# Patient Record
Sex: MALE | Employment: OTHER | URBAN - METROPOLITAN AREA
[De-identification: names, ages, dates, MRNs, and addresses within clinical notes are randomized per-mention and may not be internally consistent; named-entity substitution may affect disease eponyms.]

---

## 2017-03-08 ENCOUNTER — ALLSCRIPTS OFFICE VISIT (OUTPATIENT)
Dept: OTHER | Facility: OTHER | Age: 47
End: 2017-03-08

## 2017-03-08 DIAGNOSIS — M77.11 LATERAL EPICONDYLITIS OF RIGHT ELBOW: ICD-10-CM

## 2017-09-08 ENCOUNTER — HOSPITAL ENCOUNTER (EMERGENCY)
Facility: HOSPITAL | Age: 47
Discharge: HOME | End: 2017-09-08
Attending: EMERGENCY MEDICINE | Admitting: EMERGENCY MEDICINE
Payer: COMMERCIAL

## 2017-09-08 VITALS
RESPIRATION RATE: 18 BRPM | HEART RATE: 103 BPM | TEMPERATURE: 98.3 F | SYSTOLIC BLOOD PRESSURE: 200 MMHG | OXYGEN SATURATION: 98 % | BODY MASS INDEX: 28.25 KG/M2 | HEIGHT: 67 IN | DIASTOLIC BLOOD PRESSURE: 110 MMHG | WEIGHT: 180 LBS
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
## 2017-09-08 DIAGNOSIS — M54.50 ACUTE BILATERAL LOW BACK PAIN WITHOUT SCIATICA: ICD-10-CM

## 2017-09-08 DIAGNOSIS — M54.2 ANTERIOR NECK PAIN: ICD-10-CM

## 2017-09-08 PROCEDURE — 99284 EMERGENCY DEPT VISIT MOD MDM: CPT

## 2017-09-08 RX ORDER — ACETAMINOPHEN 325 MG/1
975 TABLET ORAL ONCE
Status: COMPLETED | OUTPATIENT
Start: 2017-09-08 | End: 2017-09-08

## 2017-09-08 RX ORDER — NAPROXEN 500 MG/1
500 TABLET ORAL ONCE
Status: COMPLETED | OUTPATIENT
Start: 2017-09-08 | End: 2017-09-08

## 2017-09-08 RX ADMIN — ACETAMINOPHEN 975 MG: 325 TABLET, FILM COATED ORAL at 16:16

## 2017-09-08 RX ADMIN — NAPROXEN 500 MG: 500 TABLET ORAL at 16:15

## 2017-11-17 ENCOUNTER — ALLSCRIPTS OFFICE VISIT (OUTPATIENT)
Dept: OTHER | Facility: OTHER | Age: 47
End: 2017-11-17

## 2017-11-17 ENCOUNTER — HOSPITAL ENCOUNTER (OUTPATIENT)
Dept: RADIOLOGY | Facility: HOSPITAL | Age: 47
Discharge: HOME | End: 2017-11-17
Attending: ORTHOPAEDIC SURGERY
Payer: COMMERCIAL

## 2017-11-17 DIAGNOSIS — M25.562 PAIN IN LEFT KNEE: ICD-10-CM

## 2017-11-17 DIAGNOSIS — M22.40 CHONDROMALACIA PATELLAE: ICD-10-CM

## 2017-11-17 PROCEDURE — 73562 X-RAY EXAM OF KNEE 3: CPT

## 2017-11-18 NOTE — PROGRESS NOTES
Assessment    1  Left knee pain (645 67) (M25 562)    Plan  Chondromalacia of patella, unspecified laterality    · *1 - [facility]  * evaluate and treat 2 times week for up to 6weeks  Status: Active  Requested for: [id number]   () Care Summary provided  : Yes1   Left knee pain    · Administered: Betamethasone Sod Phos & Acet 6 (3-3) MG/ML Injection Suspension(Celestone Soluspan)1    · XR KNEE 1 OR 2 VIEW LEFT; Status:Active - Retrospective By Protocol Authorization; Requested for:[id number];      1 Amended By: Tru Aguilera; Nov 17 2017 11:01 AM EST    Discussion/Summary    Left knee pain suspect patellofemoral chondromalacia  Also medial joint space arthritis  Krystina taping technique providedinjection givenFollow-up on a p r n  basis  Referral to physical therapy  Chief Complaint    1  Knee Pain    History of Present Illness  HPI: Patient comes in today with regards to his left knee  He reports that he can't bend or flex it  He reports that the pain seems to be aggravated with quick movements  No specific injury that he can recollect  He has tried anti-inflammatories ibuprofen Aleve as well as tramadol  He has not had any relief with this  No other treatments no other injuries to this knee  Although his chart does show that he has been seen in 2013 for the same knee by Dr Effie Fajardo  Pain ranges from 7 out 10-10 out 10  No injury sudden movement increases the pain but so does tip toeing  Past medical history otherwise positive for fibromyalgia and multiple joint pain  He has had rotator cuff surgery as well in the past       Review of Systems   Constitutional: No fever or chills, feels well, no tiredness, no recent weight loss or weight gain  Eyes: No complaints of red eyes, no eyesight problems  ENT: no complaints of loss of hearing, no nosebleeds, no sore throat  Cardiovascular: No complaints of chest pain, no palpitations, no leg claudication or lower extremity edema    Respiratory: No complaints of shortness of breath, no wheezing, no cough  Gastrointestinal: No complaints of abdominal pain, no constipation, no nausea or vomiting, no diarrhea or bloody stools  Genitourinary: No complaints of dysuria or incontinence, no hesitancy, no nocturia  Musculoskeletal: as noted in HPI  Integumentary: No complaints of skin rash or lesion, no itching or dry skin, no skin wounds  Neurological: No complaints of headache, no confusion, no numbness or tingling, no dizziness  Psychiatric: No suicidal thoughts, no anxiety, no depression  Endocrine: No muscle weakness, no frequent urination, no excessive thirst, no feelings of weakness  Active Problems  1  Carpal tunnel syndrome of right wrist (354 0) (G56 01)  2  Chondromalacia of patella, unspecified laterality (717 7) (M22 40)  3  Closed Fracture Of The Head Of The Right Fifth Metacarpal (815 00)  4  Closed Fracture Of The Head Of The Right Fourth Metacarpal (815 00)  5  Cubital tunnel syndrome on left (354 2) (G56 22)  6  Cubital tunnel syndrome on right (354 2) (G56 21)  7  Epicondylitis, lateral, right (726 32) (M77 11)  8  Incomplete tear of right rotator cuff (840 4) (M75 111)  9  Left knee pain (719 46) (M25 562)  10  Neuropathy, 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 (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 2) (G56 20)  11  Pain in joint of right shoulder region (719 41) (M25 511)    Past Medical History    The active problems and past medical history were reviewed and updated today  Surgical History   · History of Neuroplasty Ulnar Nerve    The surgical history was reviewed and updated today  Family History    The family history was reviewed and updated today  Social History     · Never A Smoker  The social history was reviewed and updated today  Current Meds  1  Fioricet TABS (Butalbital-APAP-Caffeine) Recorded  2  Ibuprofen 800 MG Oral Tablet; Therapy: 21Kje8620 to (Last Rx:11Slo7650)  Requested for: 42Jpx8061 Ordered  3  LamoTRIgine 150 MG Oral Tablet;  Therapy: 69ZTX5537 to (Last Rx:12Nov2010)  Requested for: 07UFJ4554 Ordered  4  Oxycodone-Acetaminophen 5-325 MG Oral Tablet; TAKE 1 TABLET EVERY 4 TO 6 HOURS AS NEEDED FOR PAIN; Therapy: 09Apr2014 to (Last Rx:09Apr2014) Ordered  5  Robaxin TABS (Methocarbamol); Therapy: (Vicenta Roll) to Recorded  6  TraMADol HCl - 50 MG Oral Tablet; Therapy: 39Nyc4396 to (Last Rx:33Ffo8051)  Requested for: 19Bsn1842 Ordered    The medication list was reviewed and updated today  Allergies  1  No Known Drug Allergies    Vitals  Signs     Heart Rate: 86  Systolic: 641  Diastolic: 101  Height: 5 ft 7 in  Weight: 183 lb 2 08 oz  BMI Calculated: 28 68  BSA Calculated: 1 95    Physical Exam   Constitutional - General appearance: Normal   Musculoskeletal - Digits and nails: Normal -- Inspection/palpation of joints, bones, and muscles: Normal -- Muscle strength/tone: Normal   Cardiovascular - Pulses: Normal   Lymphatic - Palpation of lymph nodes in other areas: Normal   Skin - Skin and subcutaneous tissue: Normal   Neurologic - Reflexes: Normal -- Sensation: Normal -- Lower extremity peripheral neuro exam: Normal   Psychiatric - Orientation to person, place, and time: Normal -- Mood and affect: Normal   Eyes  Conjunctiva and lids: Normal        Results/Data  I personally reviewed the films/images/results in the office today  My interpretation follows  X-ray Review Mild arthritis medial joint space  Procedure    Procedure: Injection of the right knee joint  Indication:  Osteoarthritis  Potential complications include bleeding  Risk were discussed with the patient  Verbal consent was obtained prior to the procedure  Alcohol and Betadine was used to prep the area  ethyl chloride spray was used as a topical anesthetic  Using sterile technique, the aspiration/injection needle was then directed from a Anterolateral aspect  A 22-gauge was used to inject 1 mL of 1% Lidocaine,-- 1 mL of 0 25% Bupivacaine-- and-- 2 mL of 6mg/mL betamethasone   A bandage was applied  the patient tolerated the procedure well  Complications: none        Signatures   Electronically signed by : Leonela Soria DO; Nov 17 2017 11:01AM EST                       (Author)

## 2018-01-05 ENCOUNTER — APPOINTMENT (OUTPATIENT)
Dept: PHYSICAL THERAPY | Facility: CLINIC | Age: 48
End: 2018-01-05
Payer: COMMERCIAL

## 2018-01-05 PROCEDURE — G8990 OTHER PT/OT CURRENT STATUS: HCPCS

## 2018-01-05 PROCEDURE — 97162 PT EVAL MOD COMPLEX 30 MIN: CPT

## 2018-01-05 PROCEDURE — 97110 THERAPEUTIC EXERCISES: CPT

## 2018-01-05 PROCEDURE — G8991 OTHER PT/OT GOAL STATUS: HCPCS

## 2018-01-08 ENCOUNTER — APPOINTMENT (OUTPATIENT)
Dept: PHYSICAL THERAPY | Facility: CLINIC | Age: 48
End: 2018-01-08
Payer: COMMERCIAL

## 2018-01-08 PROCEDURE — 97110 THERAPEUTIC EXERCISES: CPT

## 2018-01-11 NOTE — PROGRESS NOTES
Assessment    1  Epicondylitis, lateral, right (72 32) (M77 11)    Plan  Epicondylitis, lateral, right    · Follow-up visit in 2 months Evaluation and Treatment  Follow-up  Status: Hold For -  Scheduling  Requested for: 13KAF7470   · Continue with our present treatment plan ; Status:Complete;   Done: 78BAX9783 05:37PM   · *1 - [facility] Physician Referral  Consult: right ECRL and  brachioradialis tendonitis, hx of ECRB release/tennis elbow  Status: Active  Requested  for: 57MZR7683  Care Summary provided  : Yes    Discussion/Summary    56-year-old male with history of right tennis elbow release, currently presenting with symptoms of pain over the lateral epicondyle and tendinitis of the ECRL, brachial radialis  We will send patient for physical therapy  Followup in 3 months     Chief Complaint    1  Elbow Pain  Right lateral elbow pain, left medial elbow pain  History of Present Illness  HPI: Patient is a 56-year-old male, who is approximately one-year status post right lateral epicondylar release  Patient reports that he is having recurrence of right elbow laterally based complaints  He has pain with wrist extenVirtually denies numbness or tingling  kavon and tight gripping  He denies a numbness or tingling  He reports that occasionally his left ulnar nerve jumps  Review of Systems    Constitutional: No fever or chills, feels well, no tiredness, no recent weight loss or weight gain  Eyes: No complaints of red eyes, no eyesight problems  ENT: no complaints of loss of hearing, no nosebleeds, no sore throat  Cardiovascular: No complaints of chest pain, no palpitations, no leg claudication or lower extremity edema  Respiratory: No complaints of shortness of breath, no wheezing, no cough  Gastrointestinal: No complaints of abdominal pain, no constipation, no nausea or vomiting, no diarrhea or bloody stools     Genitourinary: No complaints of dysuria or incontinence, no hesitancy, no nocturia  Musculoskeletal: as noted in HPI  Integumentary: No complaints of skin rash or lesion, no itching or dry skin, no skin wounds  Neurological: No complaints of headache, no confusion, no numbness or tingling, no dizziness  Psychiatric: No suicidal thoughts, no anxiety, no depression  Endocrine: No muscle weakness, no frequent urination, no excessive thirst, no feelings of weakness  ROS reviewed  Active Problems    1  Carpal tunnel syndrome of right wrist (354 0) (G56 01)   2  Chondromalacia of patella, unspecified laterality (717 7) (M22 40)   3  Closed Fracture Of The Head Of The Right Fifth Metacarpal (815 00)   4  Closed Fracture Of The Head Of The Right Fourth Metacarpal (815 00)   5  Cubital tunnel syndrome on left (354 2) (G56 22)   6  Cubital tunnel syndrome on right (354 2) (G56 21)   7  Epicondylitis, lateral, right (726 32) (M77 11)   8  Incomplete tear of right rotator cuff (840 4) (M75 111)   9  Left knee pain (719 46) (M25 562)   10  Neuropathy, 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 (354 2) (G56 20)   11  Pain in joint of right shoulder region (719 41) (M25 511)    Surgical History    The surgical history was reviewed and updated today  Family History    The family history was reviewed and updated today  Social History  The social history was reviewed and updated today  The social history was reviewed and is unchanged  Current Meds   1  Fioricet TABS Recorded   2  Ibuprofen 800 MG Oral Tablet; Therapy: 21Feb2011 to (Last Rx:21Feb2011)  Requested for: 32Vmj2408 Ordered   3  LamoTRIgine 150 MG Oral Tablet; Therapy: 72HHD6832 to (Last Rx:12Nov2010)  Requested for: 99QCJ7896 Ordered   4  Oxycodone-Acetaminophen 5-325 MG Oral Tablet; TAKE 1 TABLET EVERY 4 TO 6   HOURS AS NEEDED FOR PAIN;   Therapy: 09Apr2014 to (Last Rx:09Apr2014) Ordered   5  Robaxin TABS; Therapy: (Beth Broderick to Recorded   6  TraMADol HCl - 50 MG Oral Tablet;    Therapy: 74Yef5874 to (Last Rx:21Feb2011) Requested for: 37Gyf5210 Ordered    The medication list was reviewed and updated today  Allergies    1  No Known Drug Allergies    Vitals  Signs    Heart Rate: 69  Systolic: 908  Diastolic: 522  Height: 5 ft 7 in  Weight: 187 lb 2 08 oz  BMI Calculated: 29 31  BSA Calculated: 1 96    Physical Exam       Right Epicondyle: Tenderness Lateral Epicondyle, Pain with resisted wrist extension and Pain with palm up lifting    Skin: was evaluated and demonstrated no masses, no abrasions, no erythema, no effusion, no edema, no fluctuance, no induration, no laceration, no ulceration, no lymphadenopathy  Attending Note  Attending Note [facility]: Attending Note: I interviewed, took the history and examined the patient, I discussed the case with the Resident and reviewed the Resident's note and I agree with the Resident management plan as it was presented to me  Level of Participation: I was present in clinic and examined the patient  Patient's History: Patient presents for repeat evaluation right elbow pain on the lateral side  The numbness tingling fevers chills  Pain is superior to the previous incision  Started about a month ago  Key Parts of the Exam: No tenderness over the lateral upper condyle patient has full elbow range of motion tenderness over the brachioradialis origin as well as the origin of the extensor carpi radialis longus neurologically intact  Diagnosis and Plan: Brachioradialis tenderness extensor carpal radialis longus tenderness  Formal therapy follow-up 2 months  I agree with the Resident's note        Signatures   Electronically signed by : CR Iyer ; Mar  8 2017  5:38PM EST                       (Author)

## 2018-01-12 ENCOUNTER — APPOINTMENT (OUTPATIENT)
Dept: PHYSICAL THERAPY | Facility: CLINIC | Age: 48
End: 2018-01-12
Payer: COMMERCIAL

## 2018-01-13 VITALS
BODY MASS INDEX: 29.37 KG/M2 | WEIGHT: 187.13 LBS | DIASTOLIC BLOOD PRESSURE: 132 MMHG | HEIGHT: 67 IN | SYSTOLIC BLOOD PRESSURE: 192 MMHG | HEART RATE: 69 BPM

## 2018-01-13 VITALS
WEIGHT: 183.13 LBS | HEART RATE: 86 BPM | BODY MASS INDEX: 28.74 KG/M2 | DIASTOLIC BLOOD PRESSURE: 107 MMHG | SYSTOLIC BLOOD PRESSURE: 154 MMHG | HEIGHT: 67 IN

## 2018-01-15 ENCOUNTER — APPOINTMENT (OUTPATIENT)
Dept: PHYSICAL THERAPY | Facility: CLINIC | Age: 48
End: 2018-01-15
Payer: COMMERCIAL

## 2018-01-15 PROCEDURE — 97112 NEUROMUSCULAR REEDUCATION: CPT

## 2018-01-15 PROCEDURE — 97110 THERAPEUTIC EXERCISES: CPT

## 2018-01-15 PROCEDURE — 97530 THERAPEUTIC ACTIVITIES: CPT

## 2018-01-15 PROCEDURE — 97140 MANUAL THERAPY 1/> REGIONS: CPT

## 2018-01-17 ENCOUNTER — APPOINTMENT (OUTPATIENT)
Dept: PHYSICAL THERAPY | Facility: CLINIC | Age: 48
End: 2018-01-17
Payer: COMMERCIAL

## 2018-01-19 ENCOUNTER — APPOINTMENT (OUTPATIENT)
Dept: PHYSICAL THERAPY | Facility: CLINIC | Age: 48
End: 2018-01-19
Payer: COMMERCIAL

## 2018-01-19 PROCEDURE — 97110 THERAPEUTIC EXERCISES: CPT

## 2018-01-19 PROCEDURE — 97140 MANUAL THERAPY 1/> REGIONS: CPT

## 2018-01-22 ENCOUNTER — TRANSCRIBE ORDERS (OUTPATIENT)
Dept: ADMINISTRATIVE | Facility: HOSPITAL | Age: 48
End: 2018-01-22

## 2018-01-22 ENCOUNTER — APPOINTMENT (OUTPATIENT)
Dept: PHYSICAL THERAPY | Facility: CLINIC | Age: 48
End: 2018-01-22
Payer: COMMERCIAL

## 2018-01-22 DIAGNOSIS — M25.562 LEFT KNEE PAIN, UNSPECIFIED CHRONICITY: Primary | ICD-10-CM

## 2018-01-26 ENCOUNTER — APPOINTMENT (OUTPATIENT)
Dept: PHYSICAL THERAPY | Facility: CLINIC | Age: 48
End: 2018-01-26
Payer: COMMERCIAL

## 2018-01-29 ENCOUNTER — HOSPITAL ENCOUNTER (OUTPATIENT)
Dept: RADIOLOGY | Facility: HOSPITAL | Age: 48
Discharge: HOME | End: 2018-01-29
Attending: ORTHOPAEDIC SURGERY
Payer: COMMERCIAL

## 2018-01-29 DIAGNOSIS — M25.562 LEFT KNEE PAIN, UNSPECIFIED CHRONICITY: ICD-10-CM

## 2018-01-29 PROCEDURE — 73721 MRI JNT OF LWR EXTRE W/O DYE: CPT

## 2018-02-05 VITALS
WEIGHT: 182 LBS | HEIGHT: 67 IN | DIASTOLIC BLOOD PRESSURE: 91 MMHG | BODY MASS INDEX: 28.56 KG/M2 | SYSTOLIC BLOOD PRESSURE: 131 MMHG | HEART RATE: 68 BPM

## 2018-02-05 DIAGNOSIS — M22.2X2 PATELLOFEMORAL DISORDER OF LEFT KNEE: Primary | ICD-10-CM

## 2018-02-05 PROCEDURE — 99212 OFFICE O/P EST SF 10 MIN: CPT | Performed by: ORTHOPAEDIC SURGERY

## 2018-02-05 RX ORDER — METHOCARBAMOL 750 MG/1
750 TABLET, FILM COATED ORAL 4 TIMES DAILY
COMMUNITY
End: 2020-09-08

## 2018-02-05 RX ORDER — MELOXICAM 15 MG/1
TABLET ORAL
COMMUNITY
Start: 2016-11-08 | End: 2018-04-02 | Stop reason: HOSPADM

## 2018-02-05 RX ORDER — METOPROLOL SUCCINATE 50 MG/1
TABLET, EXTENDED RELEASE ORAL
COMMUNITY
Start: 2017-06-16

## 2018-02-05 RX ORDER — IBUPROFEN 800 MG/1
800 TABLET ORAL EVERY 8 HOURS PRN
COMMUNITY
Start: 2011-02-21 | End: 2018-04-02 | Stop reason: HOSPADM

## 2018-02-05 RX ORDER — DIAZEPAM 5 MG/1
TABLET ORAL
Refills: 0 | COMMUNITY
Start: 2018-01-10 | End: 2018-04-02 | Stop reason: HOSPADM

## 2018-02-05 NOTE — PROGRESS NOTES
Assessment:  1  Patellofemoral disorder of left knee       Patient Active Problem List   Diagnosis    Patellofemoral disorder of left knee           Plan      Physical therapy  Follow up with us in 6 weeks            Subjective:     Patient ID:    Chief Complaint:Bobo Wing 56 y o  male      HPI    Patient comes in with regards to his left knee  He was sent for an MRI because he continued to have pain  He comes back to review that MRI      The following portions of the patient's history were reviewed and updated as appropriate: allergies, current medications, past family history, past social history, past surgical history and problem list         Social History     Social History    Marital status: Single     Spouse name: N/A    Number of children: N/A    Years of education: N/A     Occupational History    Not on file  Social History Main Topics    Smoking status: Never Smoker    Smokeless tobacco: Never Used    Alcohol use No    Drug use: No    Sexual activity: Not on file     Other Topics Concern    Not on file     Social History Narrative    No narrative on file     Past Medical History:   Diagnosis Date    Chronic back pain     Fibromyalgia     Hypertension      Past Surgical History:   Procedure Laterality Date    CARPAL TUNNEL RELEASE Left     LATERAL EPICONDYLE RELEASE Right 2/25/2016    Procedure: RELEASE EPICONDYLAR ELBOW, LATERAL;  Surgeon: Bria Desai MD;  Location:  MAIN OR;  Service:    [facility] ROTATOR CUFF REPAIR Left     ULNAR TUNNEL RELEASE Left     x2     Allergies   Allergen Reactions    Lisinopril      Other reaction(s):  Other (See Comments)  Cough and rash   Cough and rash       Reglan [Metoclopramide] Other (See Comments)     Restless; akisthesia  Restless; akisthesia     Current Outpatient Prescriptions on File Prior to Visit   Medication Sig Dispense Refill    amLODIPine (NORVASC) 10 mg tablet Take 10 mg by mouth daily Indications: High Blood Pressure, pt unsure of dose   Losartan Potassium (COZAAR PO) Take 1 tablet by mouth daily   traMADol (ULTRAM) 50 mg tablet Take 50 mg by mouth daily   [DISCONTINUED] butalbital-acetaminophen-caffeine (FIORICET,ESGIC) -40 mg per tablet Take 1 tablet by mouth every 4 (four) hours as needed for headaches   [DISCONTINUED] metoprolol tartrate (LOPRESSOR) 25 mg tablet Take 25 mg by mouth daily  No current facility-administered medications on file prior to visit  Objective:        Ortho Exam no effusion or ecchymosis pinpoint tenderness with patellar grind test         I have personally reviewed pertinent films in PACS and my interpretation is lateral tracking and tilting of the patellar with intraosseous inflammation  Portions of the record may have been created with voice recognition software   Occasional wrong word or "sound a like" substitutions may have occurred due to the inherent limitations of voice recognition software   Read the chart carefully and recognize, using context, where substitutions have occurred

## 2018-02-09 ENCOUNTER — HOSPITAL ENCOUNTER (EMERGENCY)
Facility: HOSPITAL | Age: 48
Discharge: LEFT BEFORE DISCHARGE | End: 2018-02-10
Payer: COMMERCIAL

## 2018-02-09 VITALS
OXYGEN SATURATION: 98 % | RESPIRATION RATE: 18 BRPM | BODY MASS INDEX: 26.98 KG/M2 | SYSTOLIC BLOOD PRESSURE: 162 MMHG | HEART RATE: 86 BPM | TEMPERATURE: 98.5 F | HEIGHT: 68 IN | DIASTOLIC BLOOD PRESSURE: 104 MMHG | WEIGHT: 178 LBS

## 2018-02-09 LAB
ALBUMIN SERPL BCP-MCNC: 3.9 G/DL (ref 3.5–5)
ALP SERPL-CCNC: 106 U/L (ref 46–116)
ALT SERPL W P-5'-P-CCNC: 44 U/L (ref 12–78)
ANION GAP SERPL CALCULATED.3IONS-SCNC: 9 MMOL/L (ref 4–13)
AST SERPL W P-5'-P-CCNC: 34 U/L (ref 5–45)
BASOPHILS # BLD AUTO: 0.01 THOUSANDS/ΜL (ref 0–0.1)
BASOPHILS NFR BLD AUTO: 0 % (ref 0–1)
BILIRUB SERPL-MCNC: 0.49 MG/DL (ref 0.2–1)
BUN SERPL-MCNC: 10 MG/DL (ref 5–25)
CALCIUM SERPL-MCNC: 9 MG/DL (ref 8.3–10.1)
CHLORIDE SERPL-SCNC: 104 MMOL/L (ref 100–108)
CO2 SERPL-SCNC: 25 MMOL/L (ref 21–32)
CREAT SERPL-MCNC: 1.02 MG/DL (ref 0.6–1.3)
EOSINOPHIL # BLD AUTO: 0.04 THOUSAND/ΜL (ref 0–0.61)
EOSINOPHIL NFR BLD AUTO: 1 % (ref 0–6)
ERYTHROCYTE [DISTWIDTH] IN BLOOD BY AUTOMATED COUNT: 13 % (ref 11.6–15.1)
GFR SERPL CREATININE-BSD FRML MDRD: 88 ML/MIN/1.73SQ M
GLUCOSE SERPL-MCNC: 98 MG/DL (ref 65–140)
HCT VFR BLD AUTO: 48 % (ref 36.5–49.3)
HGB BLD-MCNC: 17.1 G/DL (ref 12–17)
LYMPHOCYTES # BLD AUTO: 2.02 THOUSANDS/ΜL (ref 0.6–4.47)
LYMPHOCYTES NFR BLD AUTO: 38 % (ref 14–44)
MCH RBC QN AUTO: 32.3 PG (ref 26.8–34.3)
MCHC RBC AUTO-ENTMCNC: 35.6 G/DL (ref 31.4–37.4)
MCV RBC AUTO: 91 FL (ref 82–98)
MONOCYTES # BLD AUTO: 0.59 THOUSAND/ΜL (ref 0.17–1.22)
MONOCYTES NFR BLD AUTO: 11 % (ref 4–12)
NEUTROPHILS # BLD AUTO: 2.66 THOUSANDS/ΜL (ref 1.85–7.62)
NEUTS SEG NFR BLD AUTO: 50 % (ref 43–75)
NRBC BLD AUTO-RTO: 0 /100 WBCS
PLATELET # BLD AUTO: 201 THOUSANDS/UL (ref 149–390)
PMV BLD AUTO: 9.7 FL (ref 8.9–12.7)
POTASSIUM SERPL-SCNC: 3.5 MMOL/L (ref 3.5–5.3)
PROT SERPL-MCNC: 8.2 G/DL (ref 6.4–8.2)
RBC # BLD AUTO: 5.3 MILLION/UL (ref 3.88–5.62)
SODIUM SERPL-SCNC: 138 MMOL/L (ref 136–145)
TROPONIN I SERPL-MCNC: <0.02 NG/ML
WBC # BLD AUTO: 5.33 THOUSAND/UL (ref 4.31–10.16)

## 2018-02-09 PROCEDURE — 93005 ELECTROCARDIOGRAM TRACING: CPT

## 2018-02-09 PROCEDURE — 84484 ASSAY OF TROPONIN QUANT: CPT

## 2018-02-09 PROCEDURE — 80053 COMPREHEN METABOLIC PANEL: CPT

## 2018-02-09 PROCEDURE — 85025 COMPLETE CBC W/AUTO DIFF WBC: CPT

## 2018-02-09 PROCEDURE — 36415 COLL VENOUS BLD VENIPUNCTURE: CPT

## 2018-02-10 LAB
ATRIAL RATE: 81 BPM
P AXIS: 63 DEGREES
PR INTERVAL: 140 MS
QRS AXIS: 45 DEGREES
QRSD INTERVAL: 84 MS
QT INTERVAL: 352 MS
QTC INTERVAL: 408 MS
T WAVE AXIS: 36 DEGREES
VENTRICULAR RATE: 81 BPM

## 2018-02-10 PROCEDURE — 93010 ELECTROCARDIOGRAM REPORT: CPT | Performed by: INTERNAL MEDICINE

## 2018-02-10 PROCEDURE — 99284 EMERGENCY DEPT VISIT MOD MDM: CPT

## 2018-03-26 VITALS
HEART RATE: 82 BPM | WEIGHT: 182 LBS | HEIGHT: 67 IN | SYSTOLIC BLOOD PRESSURE: 145 MMHG | BODY MASS INDEX: 28.56 KG/M2 | DIASTOLIC BLOOD PRESSURE: 92 MMHG

## 2018-03-26 DIAGNOSIS — M22.2X2 PATELLOFEMORAL DISORDER OF LEFT KNEE: Primary | ICD-10-CM

## 2018-03-26 PROCEDURE — 99213 OFFICE O/P EST LOW 20 MIN: CPT | Performed by: ORTHOPAEDIC SURGERY

## 2018-03-26 NOTE — PROGRESS NOTES
Patient Name:  Juan Pablo Marcus  MRN:  [id number]    Assessment & Plan    Left knee patellofemoral dysfunction  1  Continue home exercises  2  Anti-inflammatories as needed  3  Activities as tolerated, no restrictions  4  Follow-up as needed  Subjective    60-year-old male returns to the office today for follow-up regarding his left knee patellofemoral dysfunction  He has been participating in home exercises and notes overall improvement  He still notes discomfort with quick knee motion  He initially did utilize Flaherty taping but stopped due to significant pain caused by his leg hair  He denies any swelling or stiffness  No weakness or instability  No fever/chills  General ROS:  Negative for fever, lethargy/malaise, or night sweats  Objective    /92   Pulse 82   Ht 5' 7" (1 702 m)   Wt 82 6 kg (182 lb)   BMI 28 51 kg/m²     Left knee:  No gross deformity  Skin intact  No erythema ecchymosis or swelling  No effusion  No joint line tenderness  Range of motion is intact and full without discomfort  Negative patellar grind test   Stable to varus and valgus stress  Negative Jadiel's  Sensation intact left lower extremity

## 2018-03-28 ENCOUNTER — APPOINTMENT (EMERGENCY)
Dept: RADIOLOGY | Facility: HOSPITAL | Age: 48
End: 2018-03-28
Payer: COMMERCIAL

## 2018-03-28 ENCOUNTER — HOSPITAL ENCOUNTER (EMERGENCY)
Facility: HOSPITAL | Age: 48
Discharge: HOME | End: 2018-03-28
Attending: EMERGENCY MEDICINE
Payer: COMMERCIAL

## 2018-03-28 VITALS
RESPIRATION RATE: 18 BRPM | BODY MASS INDEX: 27.1 KG/M2 | TEMPERATURE: 98.5 F | DIASTOLIC BLOOD PRESSURE: 108 MMHG | SYSTOLIC BLOOD PRESSURE: 170 MMHG | WEIGHT: 173 LBS | OXYGEN SATURATION: 98 % | HEART RATE: 101 BPM

## 2018-03-28 DIAGNOSIS — S86.012A ACHILLES TENDON RUPTURE, LEFT, INITIAL ENCOUNTER: Primary | ICD-10-CM

## 2018-03-28 PROCEDURE — 99284 EMERGENCY DEPT VISIT MOD MDM: CPT

## 2018-03-28 PROCEDURE — 73610 X-RAY EXAM OF ANKLE: CPT

## 2018-03-28 RX ORDER — IBUPROFEN 400 MG/1
400 TABLET ORAL ONCE
Status: COMPLETED | OUTPATIENT
Start: 2018-03-28 | End: 2018-03-28

## 2018-03-28 RX ORDER — OXYCODONE HYDROCHLORIDE 5 MG/1
5 TABLET ORAL EVERY 4 HOURS PRN
Qty: 15 TABLET | Refills: 0 | Status: SHIPPED | OUTPATIENT
Start: 2018-03-28 | End: 2018-04-02 | Stop reason: HOSPADM

## 2018-03-28 RX ORDER — OXYCODONE HYDROCHLORIDE 5 MG/1
5 TABLET ORAL ONCE
Status: COMPLETED | OUTPATIENT
Start: 2018-03-28 | End: 2018-03-28

## 2018-03-28 RX ADMIN — OXYCODONE HYDROCHLORIDE 5 MG: 5 TABLET ORAL at 23:47

## 2018-03-28 RX ADMIN — OXYCODONE HYDROCHLORIDE 5 MG: 5 TABLET ORAL at 21:49

## 2018-03-28 RX ADMIN — IBUPROFEN 400 MG: 400 TABLET ORAL at 21:28

## 2018-03-29 VITALS
HEIGHT: 67 IN | BODY MASS INDEX: 27.62 KG/M2 | HEART RATE: 67 BPM | WEIGHT: 176 LBS | SYSTOLIC BLOOD PRESSURE: 151 MMHG | DIASTOLIC BLOOD PRESSURE: 108 MMHG

## 2018-03-29 DIAGNOSIS — S86.002A INJURY OF LEFT ACHILLES TENDON, INITIAL ENCOUNTER: Primary | ICD-10-CM

## 2018-03-29 PROCEDURE — 99214 OFFICE O/P EST MOD 30 MIN: CPT | Performed by: ORTHOPAEDIC SURGERY

## 2018-03-29 RX ORDER — CHLORHEXIDINE GLUCONATE 4 G/100ML
SOLUTION TOPICAL DAILY PRN
Status: CANCELLED | OUTPATIENT
Start: 2018-03-29

## 2018-03-29 NOTE — PROGRESS NOTES
53 y o male presents for evaluation of left Achilles tendon rupture  Patient states that he was running with his dog yesterday when he felt a popping sensation and sharp pain  He was sent to the ER for evaluation and was noted to have no fractures on the x-ray but there were signs for suspected Achilles tendon rupture  Patient denies any numbness/tingling in the toes  He denies any other acute or associated complaints  Review of Systems  Review of systems negative unless otherwise specified in HPI    Past Medical History  Past Medical History:   Diagnosis Date    Chronic back pain     Fibromyalgia     Hypertension     RA (rheumatoid arthritis) ([address] )        Past Surgical History  Past Surgical History:   Procedure Laterality Date    CARPAL TUNNEL RELEASE Left     LATERAL EPICONDYLE RELEASE Right 2/25/2016    Procedure: RELEASE EPICONDYLAR ELBOW, LATERAL;  Surgeon: German Workman MD;  Location: [facility];  Service:    [facility] ROTATOR CUFF REPAIR Left     ULNAR TUNNEL RELEASE Left     x2       Current Medications  Current Outpatient Prescriptions on File Prior to Visit   Medication Sig Dispense Refill    amLODIPine (NORVASC) 10 mg tablet Take 10 mg by mouth daily Indications: High Blood Pressure, pt unsure of dose   Butalbital-APAP-Caffeine (FIORICET PO) Take by mouth daily as needed Unknown dose      diazepam (VALIUM) 5 mg tablet take 1 tablet by mouth 1 HOUR PRIOR TO PROCEDURE  MUST HAVE A    0    ibuprofen (MOTRIN) 800 mg tablet Take by mouth      Losartan Potassium (COZAAR PO) Take 1 tablet by mouth daily          meloxicam (MOBIC) 15 mg tablet TAKE 1 TABLET (15 MG TOTAL) BY MOUTH DAILY      methocarbamol (ROBAXIN) 750 mg tablet Take 750 mg by mouth 4 (four) times a day      metoprolol succinate (TOPROL-XL) 50 mg 24 hr tablet One and a half po daily      oxyCODONE (ROXICODONE) 5 mg immediate release tablet Take 1 tablet (5 mg total) by mouth every 4 (four) hours as needed for moderate pain Max Daily Amount: 30 mg 15 tablet 0    traMADol (ULTRAM) 50 mg tablet Take 50 mg by mouth daily  Current Facility-Administered Medications on File Prior to Visit   Medication Dose Route Frequency Provider Last Rate Last Dose    [COMPLETED] ibuprofen (MOTRIN) tablet 400 mg  400 mg Oral Once Julio Rg, DO   400 mg at 03/28/18 2128    [COMPLETED] oxyCODONE (ROXICODONE) IR tablet 5 mg  5 mg Oral Once [employer], DO   5 mg at 03/28/18 2149    [COMPLETED] oxyCODONE (ROXICODONE) IR tablet 5 mg  5 mg Oral Once [employer], DO   5 mg at 03/28/18 2347       Recent Labs (HCT,HGB,PT,INR,ESR,CRP,GLU,HgA1C)    0  Lab Value Date/Time   HCT 48 0 02/09/2018 2239   HCT 47 8 10/27/2015 1646   HGB 17 1 (H) 02/09/2018 2239   HGB 17 0 10/27/2015 1646   WBC 5 33 02/09/2018 2239   WBC 10 38 (H) 10/27/2015 1646   INR 0 99 10/27/2015 1646   GLUCOSE 98 02/09/2018 2239   GLUCOSE 91 10/27/2015 1646         Physical exam  · General: Awake, Alert, Oriented  · Eyes: Pupils equal, round and reactive to light  · Heart: regular rate and rhythm  · Lungs: No audible wheezing  · Abdomen: soft  left Ankle  · Minimal swelling noted  · Significant tender to palpation over the Achilles tendon, however, there is a small area which is palpable  · Patient has limited ability to move his ankle in any direction  · Patient has fully intact sensation  · Patient has good capillary refill    Procedure  None    Imaging  None    Assessment:   48 y o male with likely left Achilles tendon rupture    Plan  · Weightbearing:  Nonweightbearing to left lower extremity  · Discussed with patient he needs an MRI of the left ankle to evaluate for Achilles tendon tear to determine surgical intervention at this time  · At this time we will go ahead and anticipates surgery for Monday for left Achilles tendon repair    · Follow-up: After surgical intervention

## 2018-03-29 NOTE — DISCHARGE INSTRUCTIONS
Leave splint in place until evaluated by orthopedics  Do not get the splint wet, as it will lose the mold  Remove splint immediately and return to the ED if you develop sudden increase in pain, numbness, trouble moving your toes or your toes turn white/blue  Call orthopedics tomorrow to schedule an appointment for one week  Achilles Tendon Rupture   WHAT YOU NEED TO KNOW:   An Achilles tendon rupture happens when your Achilles tendon tears, or separates from your heel bone  The Achilles tendon connects your calf muscle to your heel bone  It allows you to point your foot down and to rise on your toes  An Achilles tendon rupture may be caused by a sports injury or a fall  DISCHARGE INSTRUCTIONS:   Return to the emergency department if:   · Your leg feels warm, tender, and painful  It may look swollen and red  · Your foot or toes are numb  Contact your healthcare provider if:   · You have a fever  · Your symptoms do not get better with treatment  · You have questions or concerns about your condition or care  Medicines: You may  need any of the following:  · NSAIDs , such as ibuprofen, help decrease swelling, pain, and fever  This medicine is available with or without a doctor's order  NSAIDs can cause stomach bleeding or kidney problems in certain people  If you take blood thinner medicine, always ask your healthcare provider if NSAIDs are safe for you  Always read the medicine label and follow directions  · Prescription pain medicine  may be given  Ask your healthcare provider how to take this medicine safely  Some prescription pain medicines contain acetaminophen  Do not take other medicines that contain acetaminophen without talking to your healthcare provider  Too much acetaminophen may cause liver damage  Prescription pain medicine may cause constipation  Ask your healthcare provider how to prevent or treat constipation  · Take your medicine as directed    Contact your healthcare provider if you think your medicine is not helping or if you have side effects  Tell him or her if you are allergic to any medicine  Keep a list of the medicines, vitamins, and herbs you take  Include the amounts, and when and why you take them  Bring the list or the pill bottles to follow-up visits  Carry your medicine list with you in case of an emergency  Use a support device as directed: You may need crutches or a cane to decrease stress and pressure on your tendon  Your healthcare provider will tell you how much weight you can put on your leg  Ask for more information about how to use crutches or a cane correctly  Wear your brace or splint as directed  This devices will keep your tendon straight and help it heal    Rest  as directed  Your healthcare provider will tell you when it is okay to walk and play sports  You may not be able to play sports for 6 months or longer  Ask when you can go back to work or school  Do not drive until your healthcare provider says it is okay  Apply ice  on your Achilles tendon for 15 to 20 minutes every hour or as directed  Use an ice pack, or put crushed ice in a plastic bag  Cover it with a towel  Ice helps prevent tissue damage and decreases swelling and pain  Elevate  your heel above the level of your heart as often as you can  This will help decrease swelling and pain  Prop your heel on pillows or blankets to keep it elevated comfortably  Go to physical therapy as directed:  A physical therapist teaches you exercises to help improve movement and strength, and to decrease pain  You may not start physical therapy for a few weeks or until your cast is removed  Follow up with your healthcare provider as directed: You will need to return to have your cast adjusted  Write down your questions so you remember to ask them during your visits     © 2017 [employer] Information is for End User's use only and may not be sold, redistributed or otherwise used for commercial purposes  All illustrations and images included in CareNotes® are the copyrighted property of A D [employer] M [employer] Inc  or Maury Amaral  The above information is an  only  It is not intended as medical advice for individual conditions or treatments  Talk to your doctor, nurse or pharmacist before following any medical regimen to see if it is safe and effective for you

## 2018-03-29 NOTE — ED PROVIDER NOTES
History  Chief Complaint   Patient presents with    Leg Pain     Pt states his foot got caught on a step and he fell  Pt c/o left ankle pain  No obvious deformity or swelling to area  A 67-year-old male with past history of rheumatoid arthritis, hypertension and fibromyalgia; presents with left posterior ankle pain after a fall  Patient states he lost his footing when running down the stairs, and felt a pop in the posterior aspect of the ankle  Patient has been unable to ambulate on the foot since the injury  Patient complains of significant pain with any movement to the ankle and also has difficulty with plantar and dorsiflexion  Patient does complain of mild paresthesias to the toes  Patient did not take anything for his symptoms  Injury occurred just prior to arrival   Patient denies prior injury to the left foot and ankle  A/P:  Left posterior ankle pain, suspect Achilles tendon rupture  Significant deformity appreciated when palpating along the Achilles tendon  Will obtain x-ray to rule out underlying fracture  Will give oxycodone for pain control  History provided by:  Patient      Prior to Admission Medications   Prescriptions Last Dose Informant Patient Reported? Taking? Butalbital-APAP-Caffeine (FIORICET PO)   Yes Yes   Sig: Take by mouth daily as needed Unknown dose   Losartan Potassium (COZAAR PO)  Self Yes Yes   Sig: Take 1 tablet by mouth daily  amLODIPine (NORVASC) 10 mg tablet  Self Yes Yes   Sig: Take 10 mg by mouth daily Indications: High Blood Pressure, pt unsure of dose  diazepam (VALIUM) 5 mg tablet More than a month at Unknown time Self Yes No   Sig: take 1 tablet by mouth 1 HOUR PRIOR TO PROCEDURE   MUST HAVE A     ibuprofen (MOTRIN) 800 mg tablet  Self Yes Yes   Sig: Take by mouth   meloxicam (MOBIC) 15 mg tablet  Self Yes Yes   Sig: TAKE 1 TABLET (15 MG TOTAL) BY MOUTH DAILY   methocarbamol (ROBAXIN) 750 mg tablet  Self Yes Yes   Sig: Take 750 mg by mouth 4 (four) times a day   metoprolol succinate (TOPROL-XL) 50 mg 24 hr tablet  Self Yes Yes   Sig: One and a half po daily   traMADol (ULTRAM) 50 mg tablet  Self Yes Yes   Sig: Take 50 mg by mouth daily  Facility-Administered Medications: None       Past Medical History:   Diagnosis Date    Chronic back pain     Fibromyalgia     Hypertension     RA (rheumatoid arthritis) ([address] )        Past Surgical History:   Procedure Laterality Date    CARPAL TUNNEL RELEASE Left     LATERAL EPICONDYLE RELEASE Right 2/25/2016    Procedure: RELEASE EPICONDYLAR ELBOW, LATERAL;  Surgeon: Bryce Chandler MD;  [facility]: [facility];  Service:    [facility] ROTATOR CUFF REPAIR Left     ULNAR TUNNEL RELEASE Left     x2       Family History   Problem Relation Age of Onset    Hypertension Mother     Diabetes Mother      I have reviewed and agree with the history as documented  Social History   Substance Use Topics    Smoking status: Never Smoker    Smokeless tobacco: Never Used    Alcohol use No        Review of Systems   Musculoskeletal: Positive for arthralgias ( left ankle)  All other systems reviewed and are negative        Physical Exam  ED Triage Vitals [03/28/18 2119]   Temperature Pulse Respirations Blood Pressure SpO2   98 5 °F (36 9 °C) 101 18 (!) 170/108 98 %      Temp Source Heart Rate Source Patient Position - Orthostatic VS BP Location FiO2 (%)   Tympanic Monitor Sitting Right arm --      Pain Score       Worst Possible Pain           Orthostatic Vital Signs  Vitals:    03/28/18 2119   BP: (!) 170/108   Pulse: 101   Patient Position - Orthostatic VS: Sitting       Physical Exam   General Appearance: alert and oriented, nad, non toxic appearing  Skin:  Warm, dry, intact  HEENT: atraumatic, normocephalic  Neck: Supple, trachea midline  Cardiac: RRR; no murmurs, rub, gallops  Pulmonary: lungs CTAB; no wheezes, rales, rhonchi  Extremities:  Significant deformity and divit appreciated to the mid left Achilles tendon  Medial and lateral malleolus of the left ankle nontender, left metatarsals nontender  No swelling appreciated  Difficulty with plantar and dorsiflexion of the left ankle  Remainder of left foot and left lower extremity with full range of motion  Sensation intact throughout  No pedal edema, 2+ pulses; no calf tenderness, no clubbing, no cyanosis  Neuro:  no focal motor or sensory deficits, CN 2-12 grossly intact  Psych:  Normal mood and affect, normal judgement and insight      ED Medications  Medications   ibuprofen (MOTRIN) tablet 400 mg (400 mg Oral Given 3/28/18 2128)   oxyCODONE (ROXICODONE) IR tablet 5 mg (5 mg Oral Given 3/28/18 2149)   oxyCODONE (ROXICODONE) IR tablet 5 mg (5 mg Oral Given 3/28/18 2347)       Diagnostic Studies  Results Reviewed     None                 XR ankle 3+ views LEFT   ED Interpretation by Kvng Vega DO (03/28 0622)   Age indeterminate avulsion fracture off posterior talus  Soft tissue edema noted around Achilles tendon  Procedures  Orthopedic Injury  Date/Time: 3/28/2018 11:40 PM  Performed by: Forest Navarro  Authorized by: Helen Lennox   Consent: Verbal consent obtained    Injury location: ankle  Location details: left ankle  Injury type: soft tissue (Suspected achilles tendon repair)  Pre-procedure distal perfusion: normal  Pre-procedure neurological function: normal  Pre-procedure range of motion: reduced  Pre-procedure range of motion comment: plantar and dorsiflexion  Immobilization: splint  Splint type: short leg (splinted in plantar flexion)  Supplies used: Ortho-Glass  Post-procedure neurovascular assessment: post-procedure neurovascularly intact  Post-procedure distal perfusion: normal  Post-procedure neurological function: normal  Post-procedure range of motion: unchanged  Patient tolerance: Patient tolerated the procedure well with no immediate complications            Phone Consults  ED Phone Contact    ED Course  ED Course [facility]  CritCare Time    Disposition  Final diagnoses:   Achilles tendon rupture, left, initial encounter     Time reflects when diagnosis was documented in both MDM as applicable and the Disposition within this note     Time User Action Codes Description Comment    3/28/2018 11:45 PM Trinity Ruiz Add [S86 012A] Achilles tendon rupture, left, initial encounter       ED Disposition     ED Disposition Condition Comment    Discharge  Da Freedman discharge to home/self care  Condition at discharge: Good        Follow-up Information     Follow up With Specialties Details Why Contact Info    Raul Hankins MD Orthopedic Surgery Schedule an appointment as soon as possible for a visit in 1 week For re-evaluation [address]  [phone]          Discharge Medication List as of 3/28/2018 11:46 PM      START taking these medications    Details   oxyCODONE (ROXICODONE) 5 mg immediate release tablet Take 1 tablet (5 mg total) by mouth every 4 (four) hours as needed for moderate pain Max Daily Amount: 30 mg, Starting Wed 3/28/2018, Print         CONTINUE these medications which have NOT CHANGED    Details   amLODIPine (NORVASC) 10 mg tablet Take 10 mg by mouth daily Indications: High Blood Pressure, pt unsure of dose   , Historical Med      Butalbital-APAP-Caffeine (FIORICET PO) Take by mouth daily as needed Unknown dose, Historical Med      ibuprofen (MOTRIN) 800 mg tablet Take by mouth, Starting Mon 2/21/2011, Historical Med      Losartan Potassium (COZAAR PO) Take 1 tablet by mouth daily  , Historical Med      meloxicam (MOBIC) 15 mg tablet TAKE 1 TABLET (15 MG TOTAL) BY MOUTH DAILY, Historical Med      methocarbamol (ROBAXIN) 750 mg tablet Take 750 mg by mouth 4 (four) times a day, Historical Med      metoprolol succinate (TOPROL-XL) 50 mg 24 hr tablet One and a half po daily, Historical Med      traMADol (ULTRAM) 50 mg tablet Take 50 mg by mouth daily  , Historical Med      diazepam (VALIUM) 5 mg tablet take 1 tablet by mouth 1 HOUR PRIOR TO PROCEDURE  MUST HAVE A  , Historical Med           No discharge procedures on file  ED Provider  Attending physically available and evaluated Chandu Lawton I managed the patient along with the ED Attending      Electronically Signed by         Devorah Staples DO  03/28/18 8834

## 2018-03-30 NOTE — PRE-PROCEDURE INSTRUCTIONS
Pre-Surgery Instructions:   Medication Instructions    amLODIPine (NORVASC) 10 mg tablet Instructed patient per Anesthesia Guidelines   Butalbital-APAP-Caffeine (FIORICET PO) Instructed patient per Anesthesia Guidelines   diazepam (VALIUM) 5 mg tablet Instructed patient per Anesthesia Guidelines   ibuprofen (MOTRIN) 800 mg tablet Instructed patient per Anesthesia Guidelines   Losartan Potassium (COZAAR PO) Instructed patient per Anesthesia Guidelines   meloxicam (MOBIC) 15 mg tablet Instructed patient per Anesthesia Guidelines   methocarbamol (ROBAXIN) 750 mg tablet Instructed patient per Anesthesia Guidelines   metoprolol succinate (TOPROL-XL) 50 mg 24 hr tablet Instructed patient per Anesthesia Guidelines   oxyCODONE (ROXICODONE) 5 mg immediate release tablet Instructed patient per Anesthesia Guidelines   traMADol (ULTRAM) 50 mg tablet Instructed patient per Anesthesia Guidelines      Pre op instructions reviewed; verbalized understanding

## 2018-03-31 ENCOUNTER — HOSPITAL ENCOUNTER (OUTPATIENT)
Dept: RADIOLOGY | Facility: HOSPITAL | Age: 48
Discharge: HOME | End: 2018-03-31
Payer: COMMERCIAL

## 2018-03-31 DIAGNOSIS — S86.002A INJURY OF LEFT ACHILLES TENDON, INITIAL ENCOUNTER: ICD-10-CM

## 2018-03-31 PROCEDURE — 73721 MRI JNT OF LWR EXTRE W/O DYE: CPT

## 2018-03-31 NOTE — ED ATTENDING ATTESTATION
Julia Zhang DO, saw and evaluated the patient  I have discussed the patient with the resident/non-physician practitioner and agree with the resident's/non-physician practitioner's findings, Plan of Care, and MDM as documented in the resident's/non-physician practitioner's note, except where noted  All available labs and Radiology studies were reviewed  At this point I agree with the current assessment done in the Emergency Department  I have conducted an independent evaluation of this patient a history and physical is as follows:    81-year-old male injured his left ankle, calf, Achilles tendon region that he injured wall running after his dog  Patient has a deformity concerning for Achilles tendon rupture  X-rays unremarkable  Splinted and slight plantar flexion, crutches, analgesia, follow up with Orthopedic surgery      Critical Care Time  CritCare Time    Procedures

## 2018-04-01 ENCOUNTER — ANESTHESIA EVENT (OUTPATIENT)
Dept: PERIOP | Facility: HOSPITAL | Age: 48
End: 2018-04-01
Payer: COMMERCIAL

## 2018-04-01 NOTE — ANESTHESIA PREPROCEDURE EVALUATION
Review of Systems/Medical History  Patient summary reviewed  Chart reviewed  No history of anesthetic complications     Cardiovascular  Hypertension ,    Pulmonary  Negative pulmonary ROS        GI/Hepatic      Comment: Occas heartburn     Negative  ROS        Endo/Other  Negative endo/other ROS      GYN  Negative gynecology ROS          Hematology   Musculoskeletal  Rheumatoid arthritis ,   Comment: Left Achilles tendon injury        Neurology  Negative neurology ROS      Psychology     Chronic pain (Back/Fibromyalgia)           Physical Exam    Airway    Mallampati score: II  TM Distance: >3 FB       Dental   No notable dental hx     Cardiovascular  Rhythm: regular,     Pulmonary  Breath sounds clear to auscultation,     Other Findings        Anesthesia Plan  ASA Score- 2     Anesthesia Type- general with ASA Monitors  Additional Monitors:   Airway Plan: ETT  Plan Factors-    Induction- intravenous  Postoperative Plan- Plan for postoperative opioid use  Planned trial extubation    Informed Consent- Anesthetic plan and risks discussed with patient  I personally reviewed this patient with the CRNA  Discussed and agreed on the Anesthesia Plan with the CRNA  Shima Man

## 2018-04-02 ENCOUNTER — ANESTHESIA (OUTPATIENT)
Dept: PERIOP | Facility: HOSPITAL | Age: 48
End: 2018-04-02
Payer: COMMERCIAL

## 2018-04-02 ENCOUNTER — HOSPITAL ENCOUNTER (OUTPATIENT)
Facility: HOSPITAL | Age: 48
Setting detail: OUTPATIENT SURGERY
Discharge: HOME | End: 2018-04-02
Attending: ORTHOPAEDIC SURGERY | Admitting: ORTHOPAEDIC SURGERY
Payer: COMMERCIAL

## 2018-04-02 VITALS
HEART RATE: 86 BPM | TEMPERATURE: 98.2 F | DIASTOLIC BLOOD PRESSURE: 93 MMHG | WEIGHT: 174 LBS | OXYGEN SATURATION: 97 % | SYSTOLIC BLOOD PRESSURE: 152 MMHG | RESPIRATION RATE: 16 BRPM | BODY MASS INDEX: 27.31 KG/M2 | HEIGHT: 67 IN

## 2018-04-02 DIAGNOSIS — S86.002A INJURY OF LEFT ACHILLES TENDON, INITIAL ENCOUNTER: Primary | ICD-10-CM

## 2018-04-02 LAB
APTT PPP: 29 SECONDS (ref 23–35)
INR PPP: 1.04 (ref 0.86–1.16)
PROTHROMBIN TIME: 13.6 SECONDS (ref 12.1–14.4)

## 2018-04-02 PROCEDURE — 85730 THROMBOPLASTIN TIME PARTIAL: CPT | Performed by: ORTHOPAEDIC SURGERY

## 2018-04-02 PROCEDURE — 27650 REPAIR ACHILLES TENDON: CPT | Performed by: ORTHOPAEDIC SURGERY

## 2018-04-02 PROCEDURE — 85610 PROTHROMBIN TIME: CPT | Performed by: ORTHOPAEDIC SURGERY

## 2018-04-02 RX ORDER — SODIUM CHLORIDE, SODIUM LACTATE, POTASSIUM CHLORIDE, CALCIUM CHLORIDE 600; 310; 30; 20 MG/100ML; MG/100ML; MG/100ML; MG/100ML
125 INJECTION, SOLUTION INTRAVENOUS CONTINUOUS
Status: DISCONTINUED | OUTPATIENT
Start: 2018-04-02 | End: 2018-04-02 | Stop reason: HOSPADM

## 2018-04-02 RX ORDER — ASPIRIN 325 MG
325 TABLET ORAL EVERY 12 HOURS
Qty: 56 TABLET | Refills: 0 | Status: SHIPPED | OUTPATIENT
Start: 2018-04-02 | End: 2018-05-14

## 2018-04-02 RX ORDER — FENTANYL CITRATE 50 UG/ML
INJECTION, SOLUTION INTRAMUSCULAR; INTRAVENOUS AS NEEDED
Status: DISCONTINUED | OUTPATIENT
Start: 2018-04-02 | End: 2018-04-02 | Stop reason: SURG

## 2018-04-02 RX ORDER — CHLORHEXIDINE GLUCONATE 4 G/100ML
SOLUTION TOPICAL DAILY PRN
Status: DISCONTINUED | OUTPATIENT
Start: 2018-04-02 | End: 2018-04-02

## 2018-04-02 RX ORDER — ONDANSETRON 2 MG/ML
4 INJECTION INTRAMUSCULAR; INTRAVENOUS EVERY 6 HOURS PRN
Status: DISCONTINUED | OUTPATIENT
Start: 2018-04-02 | End: 2018-04-02 | Stop reason: HOSPADM

## 2018-04-02 RX ORDER — MAGNESIUM HYDROXIDE 1200 MG/15ML
LIQUID ORAL AS NEEDED
Status: DISCONTINUED | OUTPATIENT
Start: 2018-04-02 | End: 2018-04-02 | Stop reason: HOSPADM

## 2018-04-02 RX ORDER — FENTANYL CITRATE/PF 50 MCG/ML
50 SYRINGE (ML) INJECTION ONCE
Status: COMPLETED | OUTPATIENT
Start: 2018-04-02 | End: 2018-04-02

## 2018-04-02 RX ORDER — ROCURONIUM BROMIDE 10 MG/ML
INJECTION, SOLUTION INTRAVENOUS AS NEEDED
Status: DISCONTINUED | OUTPATIENT
Start: 2018-04-02 | End: 2018-04-02 | Stop reason: SURG

## 2018-04-02 RX ORDER — PROPOFOL 10 MG/ML
INJECTION, EMULSION INTRAVENOUS AS NEEDED
Status: DISCONTINUED | OUTPATIENT
Start: 2018-04-02 | End: 2018-04-02 | Stop reason: SURG

## 2018-04-02 RX ORDER — OXYCODONE HYDROCHLORIDE 5 MG/1
TABLET ORAL
Qty: 30 TABLET | Refills: 0 | Status: SHIPPED | OUTPATIENT
Start: 2018-04-02 | End: 2018-04-12 | Stop reason: SDUPTHER

## 2018-04-02 RX ORDER — MEPERIDINE HYDROCHLORIDE 25 MG/ML
12.5 INJECTION INTRAMUSCULAR; INTRAVENOUS; SUBCUTANEOUS ONCE AS NEEDED
Status: DISCONTINUED | OUTPATIENT
Start: 2018-04-02 | End: 2018-04-02 | Stop reason: HOSPADM

## 2018-04-02 RX ORDER — LIDOCAINE HYDROCHLORIDE 10 MG/ML
INJECTION, SOLUTION INFILTRATION; PERINEURAL AS NEEDED
Status: DISCONTINUED | OUTPATIENT
Start: 2018-04-02 | End: 2018-04-02 | Stop reason: SURG

## 2018-04-02 RX ORDER — ACETAMINOPHEN 325 MG/1
650 TABLET ORAL EVERY 6 HOURS PRN
Qty: 60 TABLET | Refills: 0 | Status: SHIPPED | OUTPATIENT
Start: 2018-04-02 | End: 2018-05-02

## 2018-04-02 RX ORDER — ONDANSETRON 2 MG/ML
4 INJECTION INTRAMUSCULAR; INTRAVENOUS ONCE AS NEEDED
Status: DISCONTINUED | OUTPATIENT
Start: 2018-04-02 | End: 2018-04-02 | Stop reason: HOSPADM

## 2018-04-02 RX ORDER — ONDANSETRON 2 MG/ML
INJECTION INTRAMUSCULAR; INTRAVENOUS AS NEEDED
Status: DISCONTINUED | OUTPATIENT
Start: 2018-04-02 | End: 2018-04-02 | Stop reason: SURG

## 2018-04-02 RX ORDER — EPHEDRINE SULFATE 50 MG/ML
INJECTION, SOLUTION INTRAVENOUS AS NEEDED
Status: DISCONTINUED | OUTPATIENT
Start: 2018-04-02 | End: 2018-04-02 | Stop reason: SURG

## 2018-04-02 RX ORDER — PROMETHAZINE HYDROCHLORIDE 25 MG/ML
12.5 INJECTION, SOLUTION INTRAMUSCULAR; INTRAVENOUS ONCE AS NEEDED
Status: DISCONTINUED | OUTPATIENT
Start: 2018-04-02 | End: 2018-04-02 | Stop reason: HOSPADM

## 2018-04-02 RX ORDER — SODIUM CHLORIDE, SODIUM LACTATE, POTASSIUM CHLORIDE, CALCIUM CHLORIDE 600; 310; 30; 20 MG/100ML; MG/100ML; MG/100ML; MG/100ML
50 INJECTION, SOLUTION INTRAVENOUS CONTINUOUS
Status: DISCONTINUED | OUTPATIENT
Start: 2018-04-02 | End: 2018-04-02 | Stop reason: HOSPADM

## 2018-04-02 RX ORDER — FENTANYL CITRATE/PF 50 MCG/ML
50 SYRINGE (ML) INJECTION
Status: COMPLETED | OUTPATIENT
Start: 2018-04-02 | End: 2018-04-02

## 2018-04-02 RX ORDER — MIDAZOLAM HYDROCHLORIDE 1 MG/ML
INJECTION INTRAMUSCULAR; INTRAVENOUS AS NEEDED
Status: DISCONTINUED | OUTPATIENT
Start: 2018-04-02 | End: 2018-04-02 | Stop reason: SURG

## 2018-04-02 RX ORDER — OXYCODONE HYDROCHLORIDE 5 MG/1
5 TABLET ORAL EVERY 4 HOURS PRN
Status: DISCONTINUED | OUTPATIENT
Start: 2018-04-02 | End: 2018-04-02 | Stop reason: HOSPADM

## 2018-04-02 RX ORDER — LABETALOL HYDROCHLORIDE 5 MG/ML
INJECTION, SOLUTION INTRAVENOUS AS NEEDED
Status: DISCONTINUED | OUTPATIENT
Start: 2018-04-02 | End: 2018-04-02 | Stop reason: SURG

## 2018-04-02 RX ORDER — GLYCOPYRROLATE 0.2 MG/ML
INJECTION INTRAMUSCULAR; INTRAVENOUS AS NEEDED
Status: DISCONTINUED | OUTPATIENT
Start: 2018-04-02 | End: 2018-04-02 | Stop reason: SURG

## 2018-04-02 RX ADMIN — FENTANYL CITRATE 50 MCG: 50 INJECTION, SOLUTION INTRAMUSCULAR; INTRAVENOUS at 11:48

## 2018-04-02 RX ADMIN — PROPOFOL 100 MG: 10 INJECTION, EMULSION INTRAVENOUS at 08:29

## 2018-04-02 RX ADMIN — MIDAZOLAM HYDROCHLORIDE 2 MG: 1 INJECTION, SOLUTION INTRAMUSCULAR; INTRAVENOUS at 08:24

## 2018-04-02 RX ADMIN — FENTANYL CITRATE 50 MCG: 50 INJECTION, SOLUTION INTRAMUSCULAR; INTRAVENOUS at 12:05

## 2018-04-02 RX ADMIN — FENTANYL CITRATE 50 MCG: 50 INJECTION, SOLUTION INTRAMUSCULAR; INTRAVENOUS at 11:26

## 2018-04-02 RX ADMIN — FENTANYL CITRATE 50 MCG: 50 INJECTION, SOLUTION INTRAMUSCULAR; INTRAVENOUS at 11:20

## 2018-04-02 RX ADMIN — GLYCOPYRROLATE 0.2 MG: 0.2 INJECTION, SOLUTION INTRAMUSCULAR; INTRAVENOUS at 08:41

## 2018-04-02 RX ADMIN — NEOSTIGMINE METHYLSULFATE 4 MG: 1 INJECTION, SOLUTION INTRAMUSCULAR; INTRAVENOUS; SUBCUTANEOUS at 10:28

## 2018-04-02 RX ADMIN — LIDOCAINE HYDROCHLORIDE 50 MG: 10 INJECTION, SOLUTION INFILTRATION; PERINEURAL at 08:28

## 2018-04-02 RX ADMIN — SODIUM CHLORIDE, SODIUM LACTATE, POTASSIUM CHLORIDE, AND CALCIUM CHLORIDE 125 ML/HR: .6; .31; .03; .02 INJECTION, SOLUTION INTRAVENOUS at 08:06

## 2018-04-02 RX ADMIN — FENTANYL CITRATE 50 MCG: 50 INJECTION, SOLUTION INTRAMUSCULAR; INTRAVENOUS at 12:20

## 2018-04-02 RX ADMIN — EPHEDRINE SULFATE 5 MG: 50 INJECTION, SOLUTION INTRAMUSCULAR; INTRAVENOUS; SUBCUTANEOUS at 09:30

## 2018-04-02 RX ADMIN — SODIUM CHLORIDE, SODIUM LACTATE, POTASSIUM CHLORIDE, AND CALCIUM CHLORIDE: .6; .31; .03; .02 INJECTION, SOLUTION INTRAVENOUS at 10:36

## 2018-04-02 RX ADMIN — DEXAMETHASONE SODIUM PHOSPHATE 5 MG: 10 INJECTION INTRAMUSCULAR; INTRAVENOUS at 08:39

## 2018-04-02 RX ADMIN — PROPOFOL 200 MG: 10 INJECTION, EMULSION INTRAVENOUS at 08:28

## 2018-04-02 RX ADMIN — ONDANSETRON 4 MG: 2 INJECTION INTRAMUSCULAR; INTRAVENOUS at 08:41

## 2018-04-02 RX ADMIN — GLYCOPYRROLATE 0.8 MG: 0.2 INJECTION, SOLUTION INTRAMUSCULAR; INTRAVENOUS at 10:28

## 2018-04-02 RX ADMIN — FENTANYL CITRATE 50 MCG: 50 INJECTION, SOLUTION INTRAMUSCULAR; INTRAVENOUS at 08:39

## 2018-04-02 RX ADMIN — LABETALOL HYDROCHLORIDE 5 MG: 5 INJECTION, SOLUTION INTRAVENOUS at 08:56

## 2018-04-02 RX ADMIN — FENTANYL CITRATE 50 MCG: 50 INJECTION, SOLUTION INTRAMUSCULAR; INTRAVENOUS at 11:33

## 2018-04-02 RX ADMIN — ROCURONIUM BROMIDE 50 MG: 10 INJECTION INTRAVENOUS at 08:29

## 2018-04-02 RX ADMIN — CEFAZOLIN SODIUM 2000 MG: 2 SOLUTION INTRAVENOUS at 08:40

## 2018-04-02 RX ADMIN — OXYCODONE HYDROCHLORIDE 5 MG: 5 TABLET ORAL at 14:41

## 2018-04-02 RX ADMIN — PROPOFOL 50 MG: 10 INJECTION, EMULSION INTRAVENOUS at 10:06

## 2018-04-02 RX ADMIN — FENTANYL CITRATE 50 MCG: 50 INJECTION, SOLUTION INTRAMUSCULAR; INTRAVENOUS at 08:28

## 2018-04-02 NOTE — ANESTHESIA POSTPROCEDURE EVALUATION
Post-Op Assessment Note      CV Status:  Stable    Mental Status:  Alert and awake    Hydration Status:  Euvolemic    PONV Controlled:  Controlled    Airway Patency:  Patent    Post Op Vitals Reviewed: Yes          Staff: CRNA, Anesthesiologist           /98 (04/02/18 1044)    Temp 97 6 °F (36 4 °C) (04/02/18 1044)    Pulse 76 (04/02/18 1044)   Resp 16 (04/02/18 1044)    SpO2 100 % (04/02/18 1044)

## 2018-04-02 NOTE — OP NOTE
OPERATIVE REPORT  PATIENT NAME: Neela Munoz    :  1970  MRN: [id number]  Pt Location: [facility] OR ROOM 04    SURGERY DATE: 2018    Surgeon(s) and Role:     * Jayce Velazquez MD - Primary     * Luana Estrada - Assisting     * Pierrette Denver, PA-C - Assisting    Preop Diagnosis:  Injury of left Achilles tendon, initial encounter [S86 002A]    Post-Op Diagnosis Codes:     * Injury of left Achilles tendon, initial encounter [S86 002A]    Procedure(s) (LRB):  REPAIR TENDON ACHILLES (Left)    Specimen(s):  * No specimens in log *    Estimated Blood Loss:   Minimal    Drains:       Anesthesia Type:   General    Operative Indications:  Injury of left Achilles tendon, initial encounter [S86 002A]      Operative Findings:  Completely disrupted Achilles tendon left ankle    Complications:   None    Procedure and Technique:  Open repair of left Achilles tendon  Patient was brought the operating room for repair of a an acute Achilles tendon rupture he was administered a general anesthetic and then placed in the prone position and was prepped and draped in usual sterile fashion for an operation on both ankles the right side was prepped out in the field so that attention could be compared to the normal right side  A medial incision was made over the Achilles tendon region on approximately 4 mm anterior to this area  Dissection was carried out down through subcutaneous tissue to the paratenon  The paratenon was incised for the length of the wound  The Achilles tendon was carefully mobilized proximally and distally  The distal of portion was jagged the proximal portion on was more transverse in nature  The distal stump was trimmed using an  11 blade  5   A Krackow whip stitch with 5  FiberWire was used on both sides of the disrupted tendon  The knee was flexed and the foot was plantar flexed and the on into the 5  FiberWire were tied together reapproximating the Achilles tendon   Two 0 Vicryl was used for a running suture around the repaired edges of the Achilles tendon   The area was thoroughly irrigated with sterile saline solution the paratenon was repaired using 2 0 Vicryl in a figure-of-eight fashion subcutaneous tissue was closed using 2 0 Vicryl inverted fashion the skin was closed using 3 O nylon horizontal mattress fashion sterile dressing was applied the foot was placed into plantar flexion and a an AO splint was applied patient tolerated procedure well of the operating room in good condition   I was present for the entire procedure    Patient Disposition:  PACU     SIGNATURE: Niles Bolton MD  DATE: April 2, 2018  TIME: 10:43 AM

## 2018-04-02 NOTE — PERIOPERATIVE NURSING NOTE
Patient asked what his pain level pre-op was  Rated pain at about an 8/10  Patient states his pain is now less than that    Will continue to monitor and transfer to [facility] when deemed appropriate

## 2018-04-02 NOTE — PERIOPERATIVE NURSING NOTE
Report received from [facility], [facility]  Assessment wnl  Will continue to monitor

## 2018-04-02 NOTE — H&P (VIEW-ONLY)
History  Chief Complaint   Patient presents with    Leg Pain     Pt states his foot got caught on a step and he fell  Pt c/o left ankle pain  No obvious deformity or swelling to area  A 73-year-old male with past history of rheumatoid arthritis, hypertension and fibromyalgia; presents with left posterior ankle pain after a fall  Patient states he lost his footing when running down the stairs, and felt a pop in the posterior aspect of the ankle  Patient has been unable to ambulate on the foot since the injury  Patient complains of significant pain with any movement to the ankle and also has difficulty with plantar and dorsiflexion  Patient does complain of mild paresthesias to the toes  Patient did not take anything for his symptoms  Injury occurred just prior to arrival   Patient denies prior injury to the left foot and ankle  A/P:  Left posterior ankle pain, suspect Achilles tendon rupture  Significant deformity appreciated when palpating along the Achilles tendon  Will obtain x-ray to rule out underlying fracture  Will give oxycodone for pain control  History provided by:  Patient      Prior to Admission Medications   Prescriptions Last Dose Informant Patient Reported? Taking? Butalbital-APAP-Caffeine (FIORICET PO)   Yes Yes   Sig: Take by mouth daily as needed Unknown dose   Losartan Potassium (COZAAR PO)  Self Yes Yes   Sig: Take 1 tablet by mouth daily  amLODIPine (NORVASC) 10 mg tablet  Self Yes Yes   Sig: Take 10 mg by mouth daily Indications: High Blood Pressure, pt unsure of dose  diazepam (VALIUM) 5 mg tablet More than a month at Unknown time Self Yes No   Sig: take 1 tablet by mouth 1 HOUR PRIOR TO PROCEDURE   MUST HAVE A     ibuprofen (MOTRIN) 800 mg tablet  Self Yes Yes   Sig: Take by mouth   meloxicam (MOBIC) 15 mg tablet  Self Yes Yes   Sig: TAKE 1 TABLET (15 MG TOTAL) BY MOUTH DAILY   methocarbamol (ROBAXIN) 750 mg tablet  Self Yes Yes   Sig: Take 750 mg by mouth 4 (four) times a day   metoprolol succinate (TOPROL-XL) 50 mg 24 hr tablet  Self Yes Yes   Sig: One and a half po daily   traMADol (ULTRAM) 50 mg tablet  Self Yes Yes   Sig: Take 50 mg by mouth daily  Facility-Administered Medications: None       Past Medical History:   Diagnosis Date    Chronic back pain     Fibromyalgia     Hypertension     [address] ([address]he[address]oid [address]) [address] [address]        Past Surgical History:   Procedure Laterality Date    CARPAL TUNNEL RELEASE Left     LATERAL EPICONDYLE RELEASE Right 2/25/2016    Procedure: RELEASE EPICONDYLAR ELBOW, LATERAL;  Surgeon: Jessenia Camarena MD;  Location[facility];  Service:    Guillerm[facility] Zayas ROTATOR CUFF REPAIR Left     ULNAR TUNNEL RELEASE Left     x2       Family History   Problem Relation Age of Onset    Hypertension Mother     Diabetes Mother      I have reviewed and agree with the history as documented  Social History   Substance Use Topics    Smoking status: Never Smoker    Smokeless tobacco: Never Used    Alcohol use No        Review of Systems   Musculoskeletal: Positive for arthralgias ( left ankle)  All other systems reviewed and are negative        Physical Exam  ED Triage Vitals [03/28/18 2119]   Temperature Pulse Respirations Blood Pressure SpO2   98 5 °F (36 9 °C) 101 18 (!) 170/108 98 %      Temp Source Heart Rate Source Patient Position - Orthostatic VS BP Location FiO2 (%)   Tympanic Monitor Sitting Right arm --      Pain Score       Worst Possible Pain           Orthostatic Vital Signs  Vitals:    03/28/18 2119   BP: (!) 170/108   Pulse: 101   Patient Position - Orthostatic VS: Sitting       Physical Exam   General Appearance: alert and oriented, nad, non toxic appearing  Skin:  Warm, dry, intact  HEENT: atraumatic, normocephalic  Neck: Supple, trachea midline  Cardiac: RRR; no murmurs, rub, gallops  Pulmonary: lungs CTAB; no wheezes, rales, rhonchi  Extremities:  Significant deformity and divit appreciated to the mid left Achilles tendon  Medial and lateral malleolus of the left ankle nontender, left metatarsals nontender  No swelling appreciated  Difficulty with plantar and dorsiflexion of the left ankle  Remainder of left foot and left lower extremity with full range of motion  Sensation intact throughout  No pedal edema, 2+ pulses; no calf tenderness, no clubbing, no cyanosis  Neuro:  no focal motor or sensory deficits, CN 2-12 grossly intact  Psych:  Normal mood and affect, normal judgement and insight      ED Medications  Medications   ibuprofen (MOTRIN) tablet 400 mg (400 mg Oral Given 3/28/18 2128)   oxyCODONE (ROXICODONE) IR tablet 5 mg (5 mg Oral Given 3/28/18 2149)   oxyCODONE (ROXICODONE) IR tablet 5 mg (5 mg Oral Given 3/28/18 2347)       Diagnostic Studies  Results Reviewed     None                 XR ankle 3+ views LEFT   ED Interpretation by Dalila Toth DO (03/28 4154)   Age indeterminate avulsion fracture off posterior talus  Soft tissue edema noted around Achilles tendon  Procedures  Orthopedic Injury  Date/Time: 3/28/2018 11:40 PM  Performed by: Alise Charles  Authorized by: Gera Suarez   Consent: Verbal consent obtained    Injury location: ankle  Location details: left ankle  Injury type: soft tissue (Suspected achilles tendon repair)  Pre-procedure distal perfusion: normal  Pre-procedure neurological function: normal  Pre-procedure range of motion: reduced  Pre-procedure range of motion comment: plantar and dorsiflexion  Immobilization: splint  Splint type: short leg (splinted in plantar flexion)  Supplies used: Ortho-Glass  Post-procedure neurovascular assessment: post-procedure neurovascularly intact  Post-procedure distal perfusion: normal  Post-procedure neurological function: normal  Post-procedure range of motion: unchanged  Patient tolerance: Patient tolerated the procedure well with no immediate complications            Phone Consults  ED Phone Contact    ED Course  ED Course [facility]  CritCare Time    Disposition  Final diagnoses:   Achilles tendon rupture, left, initial encounter     Time reflects when diagnosis was documented in both MDM as applicable and the Disposition within this note     Time User Action Codes Description Comment    3/28/2018 11:45 PM Urban Vance Add [S86 012A] Achilles tendon rupture, left, initial encounter       ED Disposition     ED Disposition Condition Comment    Discharge  [facility] discharge to home/self care  Condition at discharge: Good        Follow-up Information     Follow up With Specialties Details Why Contact Info    Tristin Caban MD Orthopedic Surgery Schedule an appointment as soon as possible for a visit in 1 week [facility]  [phone]          Discharge Medication List as of 3/28/2018 11:46 PM      START taking these medications    Details   oxyCODONE (ROXICODONE) 5 mg immediate release tablet Take 1 tablet (5 mg total) by mouth every 4 (four) hours as needed for moderate pain Max Daily Amount: 30 mg, Starting Wed 3/28/2018, Print         CONTINUE these medications which have NOT CHANGED    Details   amLODIPine (NORVASC) 10 mg tablet Take 10 mg by mouth daily Indications: High Blood Pressure, pt unsure of dose   , Historical Med      Butalbital-APAP-Caffeine (FIORICET PO) Take by mouth daily as needed Unknown dose, Historical Med      ibuprofen (MOTRIN) 800 mg tablet Take by mouth, Starting Mon 2/21/2011, Historical Med      Losartan Potassium (COZAAR PO) Take 1 tablet by mouth daily  , Historical Med      meloxicam (MOBIC) 15 mg tablet TAKE 1 TABLET (15 MG TOTAL) BY MOUTH DAILY, Historical Med      methocarbamol (ROBAXIN) 750 mg tablet Take 750 mg by mouth 4 (four) times a day, Historical Med      metoprolol succinate (TOPROL-XL) 50 mg 24 hr tablet One and a half po daily, Historical Med      traMADol (ULTRAM) 50 mg tablet Take 50 mg by mouth daily  , Historical Med      diazepam (VALIUM) 5 mg tablet take 1 tablet by mouth 1 HOUR PRIOR TO PROCEDURE  MUST HAVE A  , Historical Med           No discharge procedures on file  ED Provider  Attending physically available and evaluated Mammie Began  I managed the patient along with the ED Attending      Electronically Signed by         Hector Pate DO  03/28/18 0434

## 2018-04-02 NOTE — DISCHARGE INSTRUCTIONS
Discharge Instructions - Orthopedics  Blayne Organ 53 y o  male MRN: [id number]  Unit/Bed#: Operating Room    Weight Bearing Status:                                           Nonweight bearing left lower extremity in splint    DVT prophylaxis:  Remain ambulatory with crutches, take full course of aspirin as prescribed    Pain:  Continue analgesics as directed    Dressing Instructions:   Keep splint in place and do not remove until follow-up appointment  Do not remove splint until follow-up appointment  IF showering, must keep splint covered and dry at all times  PT/OT:  Not required at current time    Appt Instructions: If you do not have your appointment, please call the clinic at [phone] to f/u with Dr Naun Carmona in 1 week after surgery, otherwise follow-up as scheduled prior to surgery  Contact the office sooner if you experience any increased numbness/tingling in the extremities

## 2018-04-03 ENCOUNTER — TELEPHONE (OUTPATIENT)
Dept: OBGYN CLINIC | Facility: HOSPITAL | Age: 48
End: 2018-04-03

## 2018-04-03 NOTE — TELEPHONE ENCOUNTER
Dr Yudelka Arreola -L Achilles Tendon Repair 4/2/18    Patient calling that his pain block wore off and he is in uncontrolled pain  Patient advised to loosen the ace wrap, ice 20 min on 20 min off and elevate above the heart  Take oxycodone 2 tabs x 2 doses, Tylenol 1000mg TID and Ibuprofen 800mg TID  With food  Patient will call our office if these remedies do not control the pain  Please advise

## 2018-04-04 NOTE — TELEPHONE ENCOUNTER
I spoke with patient and he states he is taking the oxycodone 5mg 2 every 4 hrs  I advised him to make sure he is taking the Ibuprofen and alternating with tylenol, icing and elevating as this will reduce the amount of narcotic he will need to take  He verbalized understanding

## 2018-04-10 ENCOUNTER — HOSPITAL ENCOUNTER (EMERGENCY)
Facility: HOSPITAL | Age: 48
Discharge: HOME | End: 2018-04-10
Payer: COMMERCIAL

## 2018-04-10 ENCOUNTER — APPOINTMENT (EMERGENCY)
Dept: NON INVASIVE DIAGNOSTICS | Facility: HOSPITAL | Age: 48
End: 2018-04-10
Payer: COMMERCIAL

## 2018-04-10 VITALS
OXYGEN SATURATION: 97 % | HEART RATE: 80 BPM | RESPIRATION RATE: 18 BRPM | SYSTOLIC BLOOD PRESSURE: 146 MMHG | BODY MASS INDEX: 27.31 KG/M2 | TEMPERATURE: 97.8 F | HEIGHT: 67 IN | DIASTOLIC BLOOD PRESSURE: 101 MMHG | WEIGHT: 174 LBS

## 2018-04-10 DIAGNOSIS — I82.409 ACUTE DEEP VEIN THROMBOSIS (DVT) (HCC): Primary | ICD-10-CM

## 2018-04-10 DIAGNOSIS — M79.605 LEFT LEG PAIN: ICD-10-CM

## 2018-04-10 PROCEDURE — 93970 EXTREMITY STUDY: CPT

## 2018-04-10 PROCEDURE — 99284 EMERGENCY DEPT VISIT MOD MDM: CPT

## 2018-04-10 PROCEDURE — 93970 EXTREMITY STUDY: CPT | Performed by: SURGERY

## 2018-04-10 RX ORDER — OXYCODONE HYDROCHLORIDE AND ACETAMINOPHEN 5; 325 MG/1; MG/1
1 TABLET ORAL ONCE
Status: COMPLETED | OUTPATIENT
Start: 2018-04-10 | End: 2018-04-10

## 2018-04-10 RX ADMIN — APIXABAN 10 MG: 5 TABLET, FILM COATED ORAL at 14:28

## 2018-04-10 RX ADMIN — OXYCODONE HYDROCHLORIDE AND ACETAMINOPHEN 1 TABLET: 5; 325 TABLET ORAL at 12:00

## 2018-04-10 NOTE — ED PROVIDER NOTES
History  Chief Complaint   Patient presents with    Leg Pain     left calf pain and pressure, pain in groin  post op achilles tendon repair last monday, called ortho who sent him for DVT bc       52-year-old male presents to emergency room for evaluation of left calf pain  Onset this morning around 4 a m   States it woke him from sleep  States 8 days ago he had a left Achilles tendon repair  Called his orthopedic doctor about this and was sent here for evaluation of DV T  Patient denies shortness of breath or chest pain  Denies redness or swelling of the calf  Patient has been wearing his splint as directed  He is able to wiggle his toes  Denies decreased sensation of his toes  Prior to Admission Medications   Prescriptions Last Dose Informant Patient Reported? Taking?    Butalbital-APAP-Caffeine (FIORICET PO)   Yes Yes   Sig: Take 1 tablet by mouth daily as needed Unknown dose     Losartan Potassium (COZAAR PO)  Self Yes Yes   Sig: Take 100 mg by mouth daily     acetaminophen (TYLENOL) 325 mg tablet   No Yes   Sig: Take 2 tablets (650 mg total) by mouth every 6 (six) hours as needed for mild pain for up to 30 days   amLODIPine (NORVASC) 10 mg tablet  Self Yes Yes   Sig: Take 10 mg by mouth every evening     aspirin 325 mg tablet   No Yes   Sig: Take 1 tablet (325 mg total) by mouth every 12 (twelve) hours for 28 days   methocarbamol (ROBAXIN) 750 mg tablet  Self Yes Yes   Sig: Take 750 mg by mouth 4 (four) times a day   metoprolol succinate (TOPROL-XL) 50 mg 24 hr tablet  Self Yes Yes   Sig: "I take one a day bedtime"   oxyCODONE (ROXICODONE) 5 mg immediate release tablet   No Yes   Si-2 tabs po q4-6hr prn pain      Facility-Administered Medications: None       Past Medical History:   Diagnosis Date    Chronic back pain     Fibromyalgia     Hypertension     RA [facility]rheumatoid arthritis) ([facility])        Past Surgical History:   Procedure Laterality Date    CARPAL TUNNEL RELEASE Left     LATERAL EPICONDYLE RELEASE Right 2/25/2016    Procedure: RELEASE EPICONDYLAR ELBOW, LATERAL;  Surgeon: Austin Quezada MD;  [facility];  Service:     ME REPAIR ACHILLES TENDON,PRIMARY Left 4/2/2018    Procedure: REPAIR TENDON ACHILLES;  Surgeon: Eleno Elliott MD;  [facility];  Service: Orthopedics    ROTATOR CUFF REPAIR Left     ULNAR TUNNEL RELEASE Left     x2       Family History   Problem Relation Age of Onset    Hypertension Mother     Diabetes Mother      I have reviewed and agree with the history as documented  Social History   Substance Use Topics    Smoking status: Never Smoker    Smokeless tobacco: Never Used    Alcohol use No        Review of Systems   Constitutional: Negative for chills and fever  Respiratory: Negative for shortness of breath  Cardiovascular: Negative for chest pain  Musculoskeletal: Negative for joint swelling  Skin: Negative for rash  Neurological: Negative for weakness and numbness  Physical Exam  ED Triage Vitals [04/10/18 1126]   Temperature Pulse Respirations Blood Pressure SpO2   97 8 °F (36 6 °C) 80 18 (!) 146/101 97 %      Temp Source Heart Rate Source Patient Position - Orthostatic VS BP Location FiO2 (%)   Oral Monitor Sitting Right arm --      Pain Score       7           Orthostatic Vital Signs  Vitals:    04/10/18 1126   BP: (!) 146/101   Pulse: 80   Patient Position - Orthostatic VS: Sitting       Physical Exam   Constitutional: He appears well-developed and well-nourished  Cardiovascular: Intact distal pulses  Musculoskeletal:        Left knee: Normal         Left ankle: He exhibits decreased range of motion and ecchymosis  He exhibits normal pulse  Left lower leg: He exhibits tenderness  He exhibits no bony tenderness, no swelling, no edema and no deformity  Legs:       Left foot: Normal    Skin: Skin is warm and dry     Left ankle incision appears clean and dry, without drainage or surrounding erythema   Nursing note and vitals reviewed  ED Medications  Medications   oxyCODONE-acetaminophen (PERCOCET) 5-325 mg per tablet 1 tablet (1 tablet Oral Given 4/10/18 1200)   apixaban (ELIQUIS) tablet 10 mg (10 mg Oral Given 4/10/18 1428)       Diagnostic Studies  Results Reviewed     None                 VAS lower limb venous duplex study, complete bilateral    (Results Pending)              Procedures  Procedures       Phone Contacts  ED Phone Contact    ED Course  ED Course as of Apr 10 1431   Tue Apr 10, 2018   1323 + DVT, ortho paged to notify them    12 Spoke with ortho resident Dr Armando Casillas, he agrees with plan for [facility] and will f/u in office 1week, will reapply splint, non weight bearing                                MDM  Number of Diagnoses or Management Options  Acute deep vein thrombosis (DVT) [facility]):      Amount and/or Complexity of Data Reviewed  Tests in the radiology section of CPT®: ordered and reviewed (LLE + DVT per tech note)  Discuss the patient with other providers: yes (Ortho Resident Dr Armando Casillas )    Risk of Complications, Morbidity, and/or Mortality  General comments: Left posterior sugar tong ortho glass splint patient arrived with reapplied, NV status intact s/p application    Patient Progress  Patient progress: stable    CritCare Time    Disposition  Final diagnoses:   Acute deep vein thrombosis (DVT) ([address] )     Time reflects when diagnosis was documented in both MDM as applicable and the Disposition within this note     Time User Action Codes Description Comment    4/10/2018  1:32 PM Jose Smallwood Add [M79 605] Left leg pain     4/10/2018  2:28 PM Nura GELLER Add [I82 409] Acute deep vein thrombosis (DVT) [facility])       ED Disposition     ED Disposition Condition Comment    Discharge  Roxine Bare discharge to home/self care      Condition at discharge: Good        Follow-up Information     Follow up With Specialties Details Why Contact Info Additional 243 Shazia Greco MD [facility] In 1 week DVT and [place] [facility] Emergency Department Emergency Medicine  If symptoms worsen [address]  [phone]  ED, [address], [address]        Patient's Medications   Discharge Prescriptions    APIXABAN (ELIQUIS) 5 MG    Take 2 tablets (10 mg total) by mouth 2 (two) times a day for 7 days       Start Date: 4/10/2018 End Date: 4/17/2018       Order Dose: 10 mg       Quantity: 13 tablet    Refills: 0     No discharge procedures on file      ED Provider  Electronically Signed by           Mechelle Corona PA-C  04/10/18 1298

## 2018-04-10 NOTE — TELEPHONE ENCOUNTER
Patient calling this morning that he started with calf pain overnight and is also having groin pain  Tender to touch  I advised that he should to to ER to be evaluated for DVT  He wanted to know if you could if you could order venous doppler instead of him going to Er  Please advise

## 2018-04-10 NOTE — TELEPHONE ENCOUNTER
I spoke with [facility] office and they made me aware that Alice ROSARIO is in the OR this morning  I advised patient that he should go to ER for evaluation

## 2018-04-10 NOTE — DISCHARGE INSTRUCTIONS
Deep Venous Thrombosis   WHAT YOU NEED TO KNOW:   Deep venous thrombosis (DVT) is a blood clot that forms in a deep vein of the body  The deep veins in the legs, thighs, and hips are the most common sites for DVT  DVT can also occur in a deep vein within your arms  The clot prevents the normal flow of blood in the vein  The blood backs up and causes pain and swelling  The DVT can break into smaller pieces and travel to your lungs and cause a blockage called a pulmonary embolism  A pulmonary embolism can become life-threatening  DISCHARGE INSTRUCTIONS:   Call 911 for any of the following:   · You feel lightheaded, short of breath, and have chest pain  · You cough up blood  Return to the emergency department if:   · Your symptoms get worse  · You develop new DVT symptoms in another leg or arm  Contact your healthcare provider if:   · Your gums or nose bleed  · You see blood in your urine or bowel movements  · Your bowel movements are black or darker than normal     · You have questions or concerns about your conditions or care  Medicines:   · Blood thinners  help prevent the DVT from getting bigger and prevent new clots from forming  Examples of blood thinners include heparin, rivaroxaban, apixiban, and warfarin  The following are general safety guidelines to follow while you are taking a blood thinner:     ¨ Watch for bleeding and bruising  Watch for bleeding from your gums or nose  Watch for blood in your urine and bowel movements  Use a soft washcloth on your skin, and a soft toothbrush to brush your teeth  This can keep your skin and gums from bleeding  If you shave, use an electric shaver  Do not play contact sports  ¨ Tell your dentist and other healthcare providers that you take a blood thinner  Wear a bracelet or necklace that says you take this medicine  ¨ Do not start or stop any medicines unless your healthcare provider tells you to   Many medicines cannot be used with blood thinners  ¨ Tell your healthcare provider right away if you forget to take the blood thinner , or if you take too much  ¨ Warfarin  is a blood thinner that you may need to take  The following are additional things you should be aware of if you take warfarin:    § Foods and medicines can affect the amount of warfarin in your blood  Do not make major changes to your diet  Warfarin works best when you eat about the same amount of vitamin K every day  Vitamin K is found in green leafy vegetables and certain other foods  Ask for more information about what to eat or not to eat  § You will need to see your healthcare provider for follow-up visits  You will need regular blood tests to decide how much warfarin you need  · Take your medicine as directed  Contact your healthcare provider if you think your medicine is not helping or if you have side effects  Tell him or her if you are allergic to any medicine  Keep a list of the medicines, vitamins, and herbs you take  Include the amounts, and when and why you take them  Bring the list or the pill bottles to follow-up visits  Carry your medicine list with you in case of an emergency  Manage your DVT:   · Wear pressure stockings  The stockings are tight and put pressure on your legs  This improves blood flow and helps prevent clots  Wear the stockings during the day  Do not wear them when you sleep  · Elevate your legs  above the level of your heart as often as you can  This will help decrease swelling and pain  Prop your legs on pillows or blankets to keep them elevated comfortably  · Exercise regularly  to help increase your blood flow  Walking is a good low-impact exercise  Talk to your healthcare provider about the best exercise plan for you  · Change body positions often  If you travel by car or work at a desk, move and stretch in your seat several times each hour  In an airplane, get up and walk every hour   If you are bedridden, ask for help to change your position every 1 to 2 hours  · Maintain a healthy weight  Ask your healthcare provider how much you should weigh  Ask him to help you create a weight loss plan if you are overweight  · Do not smoke  Nicotine and other chemicals in cigarettes and cigars can damage blood vessels and make it more difficult to manage your DVT  Ask your healthcare provider for information if you currently smoke and need help to quit  E-cigarettes or smokeless tobacco still contain nicotine  Talk to your healthcare provider before you use these products  Follow up with your healthcare provider as directed:  Write down your questions so you remember to ask them during your visits  © 2017 [address] [employer] Information is for End User's use only and may not be sold, redistributed or otherwise used for commercial purposes  All illustrations and images included in CareNotes® are the copyrighted property of [employer] , [employer]  or Maury Amaral  The above information is an  only  It is not intended as medical advice for individual conditions or treatments  Talk to your doctor, nurse or pharmacist before following any medical regimen to see if it is safe and effective for you

## 2018-04-10 NOTE — ED NOTES
Per vascular tech, pt has acute DVT 1 of 2 mid to proximal in left calf   Negative findings in right leg     Austen Law RN  04/10/18 3255

## 2018-04-11 NOTE — TELEPHONE ENCOUNTER
Patient is following up with PCP today and will be okay with  for oxycodone 5mg refill tomorrow at [facility]  I told him we would let him know when it is at the

## 2018-04-11 NOTE — TELEPHONE ENCOUNTER
His PCP should be able to manage the DVT  I am not at [facility] today so he may need to wait until tomorrow to pick them up

## 2018-04-11 NOTE — TELEPHONE ENCOUNTER
Patient's post op appt is 4/19  He will need to have refills on the eliquis 5mg tablets  He is currently taking Eliquis 5mg 2 tabs BID for 7 days and then the protocol would be to go to Eliquis 5mg BID for 6 months  He will also need refill today on his oxycodone 5mg tablets  He discontinue ibuprofen 800mg TID and the ASA 325mg BID due to being on eliquis  Will he be required to followup with hematology or vascular to monitor his DVT  Please advise

## 2018-04-12 DIAGNOSIS — S86.002A INJURY OF LEFT ACHILLES TENDON, INITIAL ENCOUNTER: ICD-10-CM

## 2018-04-12 RX ORDER — OXYCODONE HYDROCHLORIDE 5 MG/1
TABLET ORAL
Qty: 30 TABLET | Refills: 0 | Status: SHIPPED | OUTPATIENT
Start: 2018-04-12 | End: 2018-05-14

## 2018-04-19 ENCOUNTER — OFFICE VISIT (OUTPATIENT)
Dept: OBGYN CLINIC | Facility: HOSPITAL | Age: 48
End: 2018-04-19
Payer: COMMERCIAL

## 2018-04-19 VITALS — HEART RATE: 80 BPM | DIASTOLIC BLOOD PRESSURE: 103 MMHG | SYSTOLIC BLOOD PRESSURE: 148 MMHG

## 2018-04-19 DIAGNOSIS — Z98.890 STATUS POST ACHILLES TENDON REPAIR: Primary | ICD-10-CM

## 2018-04-19 DIAGNOSIS — S86.002A INJURY OF LEFT ACHILLES TENDON, INITIAL ENCOUNTER: ICD-10-CM

## 2018-04-19 PROCEDURE — 99024 POSTOP FOLLOW-UP VISIT: CPT | Performed by: ORTHOPAEDIC SURGERY

## 2018-04-19 NOTE — PROGRESS NOTES
53 y o male presents to the office roughly 2 weeks status post left achilles tendon repair on 04/02/2018  He reports doing well  He has been able to manage his pain       Review of Systems  Review of systems negative unless otherwise specified in HPI    Past Medical History  Past Medical History:   Diagnosis Date    Chronic back pain     Fibromyalgia     Hypertension     RA (rheumatoid arthritis) ([address] )        Past Surgical History  Past Surgical History:   Procedure Laterality Date    CARPAL TUNNEL RELEASE Left     LATERAL EPICONDYLE RELEASE Right 2/25/2016    Procedure: RELEASE EPICONDYLAR ELBOW, LATERAL;  Surgeon: Ck Powers MD;  [facility];  Service:     MA REPAIR ACHILLES TENDON,PRIMARY Left 4/2/2018    Procedure: REPAIR TENDON ACHILLES;  Surgeon: Nidia Perea MD;  [facility];  Service: Orthopedics    ROTATOR CUFF REPAIR Left     ULNAR TUNNEL RELEASE Left     x2       Current Medications  Current Outpatient Prescriptions on File Prior to Visit   Medication Sig Dispense Refill    acetaminophen (TYLENOL) 325 mg tablet Take 2 tablets (650 mg total) by mouth every 6 (six) hours as needed for mild pain for up to 30 days 60 tablet 0    amLODIPine (NORVASC) 10 mg tablet Take 10 mg by mouth every evening        apixaban (ELIQUIS) 5 mg Take 2 tablets (10 mg total) by mouth 2 (two) times a day for 7 days 13 tablet 0    aspirin 325 mg tablet Take 1 tablet (325 mg total) by mouth every 12 (twelve) hours for 28 days 56 tablet 0    Butalbital-APAP-Caffeine (FIORICET PO) Take 1 tablet by mouth daily as needed Unknown dose        Losartan Potassium (COZAAR PO) Take 100 mg by mouth daily        methocarbamol (ROBAXIN) 750 mg tablet Take 750 mg by mouth 4 (four) times a day      metoprolol succinate (TOPROL-XL) 50 mg 24 hr tablet "I take one a day bedtime"      oxyCODONE (ROXICODONE) 5 mg immediate release tablet 1-2 tabs po q4-6hr prn pain 30 tablet 0     No current facility-administered medications on file prior to visit  Recent Labs [facility])    0  Lab Value Date/Time   HCT 48 0 02/09/2018 2239   HCT 47 8 10/27/2015 1646   HGB 17 1 (H) 02/09/2018 2239   HGB 17 0 10/27/2015 1646   WBC 5 33 02/09/2018 2239   WBC 10 38 (H) 10/27/2015 1646   INR 1 04 04/02/2018 0745   INR 0 99 10/27/2015 1646   GLUCOSE 98 02/09/2018 2239   GLUCOSE 91 10/27/2015 1646         Physical exam  · General: Awake, Alert, Oriented  · Eyes: Pupils equal, round and reactive to light  · Heart: regular rate and rhythm  · Lungs: No audible wheezing  · Abdomen: soft  left lower extremity  · Patient ambulates with the assistance of crutches  · Mild effusion present      Imaging  None    Procedure  None    Assessment/Plan:   48 y o male is roughly 2 weeks status post left achilles tendon repair  He will start using a cam walker boot, non-weightbearing  He will start physical therapy  Weightbearing will start in 4 more weeks  We will see him back in 4 weeks

## 2018-04-23 ENCOUNTER — TELEPHONE (OUTPATIENT)
Dept: OBGYN CLINIC | Facility: HOSPITAL | Age: 48
End: 2018-04-23

## 2018-04-23 ENCOUNTER — EVALUATION (OUTPATIENT)
Dept: PHYSICAL THERAPY | Facility: CLINIC | Age: 48
End: 2018-04-23
Payer: COMMERCIAL

## 2018-04-23 DIAGNOSIS — M22.2X2 PATELLOFEMORAL PAIN SYNDROME OF LEFT KNEE: ICD-10-CM

## 2018-04-23 DIAGNOSIS — M22.2X2 PATELLOFEMORAL DISORDER OF LEFT KNEE: ICD-10-CM

## 2018-04-23 DIAGNOSIS — S86.002D INJURY OF LEFT ACHILLES TENDON, SUBSEQUENT ENCOUNTER: ICD-10-CM

## 2018-04-23 DIAGNOSIS — Z98.890 STATUS POST ACHILLES TENDON REPAIR: Primary | ICD-10-CM

## 2018-04-23 PROCEDURE — G8991 OTHER PT/OT GOAL STATUS: HCPCS | Performed by: PHYSICAL THERAPIST

## 2018-04-23 PROCEDURE — 97140 MANUAL THERAPY 1/> REGIONS: CPT | Performed by: PHYSICAL THERAPIST

## 2018-04-23 PROCEDURE — 97162 PT EVAL MOD COMPLEX 30 MIN: CPT | Performed by: PHYSICAL THERAPIST

## 2018-04-23 PROCEDURE — G8990 OTHER PT/OT CURRENT STATUS: HCPCS | Performed by: PHYSICAL THERAPIST

## 2018-04-23 PROCEDURE — 97110 THERAPEUTIC EXERCISES: CPT | Performed by: PHYSICAL THERAPIST

## 2018-04-23 NOTE — TELEPHONE ENCOUNTER
Caller: karlene Noriega  Patient's doctor: Dr Suellen Eduardo called stating the shoe lift was denied  DME did not send all the needed info  You can do a peer to peer which the number is [phone] and speak to Dr Danie Joya  Case number is [id number]

## 2018-04-23 NOTE — PROGRESS NOTES
PT Evaluation     Today's date: 2018  Patient name: Jh Hudson  : 1970  MRN: [id number]  Referring provider: Sagar Obrien MD  Dx:   Encounter Diagnosis     ICD-10-CM    1  Status post Achilles tendon repair [id number] Ambulatory referral to Physical Therapy                  Assessment  Impairments: abnormal gait, abnormal or restricted ROM, impaired physical strength and pain with function    Assessment details: Patient presents with signs and symptoms consistent with referring diagnosis  Positive prognostic indicators include: Motivated to improve  Negative prognostic indicators include: recovering from DVT, L PFPS, previously disabled  He presents with: pain, decreased: ROM, strength and  functional capacity  He requires skilled PT to address these deficits and restore maximal functional capacity  Thank you for this pleasant referral        Understanding of Dx/Px/POC: good   Prognosis: good    Goals  ST-6 weeks  1  Patient to be independent with HEP  2   Decrease pain at least 2 subjective levels  LT-12 weeks  1    Patient to voice comfort with self management of condition  2   75% or > decreased pain  3   75% or > decreased functional deficits  4   Normalize AROM of all deficit planes  5   Normalize strength  6   Functional Status Score: 55  7  Patient to voice understanding of activities/positions to avoid    8   Normalize Gait    Plan  Patient would benefit from: skilled PT  Referral necessary: No  Planned modality interventions: cryotherapy and TENS  Planned therapy interventions: IADL retraining, joint mobilization, manual therapy, motor coordination training, neuromuscular re-education, patient education, self care, strengthening, stretching, therapeutic activities, therapeutic exercise, home exercise program, flexibility, ADL training, balance, body mechanics training, balance/weight bearing training and gait training  Frequency: 2x week  Duration in weeks: 12  Treatment plan discussed with: patient        Subjective Evaluation    History of Present Illness  Date of onset: 3/29/2018  Date of surgery: 2018  Mechanism of injury: Chief Complaint: L achilles pain, limited mobility    History:  Pt was chasing his dog going up and down stairs and suffered a L achilles tear  He was seen in the Er, had an MRI and underwent surgery a few days later  He developed pain that led to a dopplar US being performed that discovered a DVT  He is on medication for this currently  This was discovered on 4/10  He is disabled        PMH: L knee pain, DVT, blood thinners x 3 mo, carpal tunnel surgeries, RTC surgeries     Aggravating factors: difficulty sleeping (comfortable positioning himself)  Relieving factors: rest    Functional Deficits: Walking, Going to the gym, standing prolonged, sleeping     Patient Goals: Get back to standing, normal walking    Quality of life: good    Pain  At best pain ratin  At worst pain ratin  [facility]: [facility]      Diagnostic Tests  MRI studies: abnormal        Objective     Active Range of Motion   Left Knee   Flexion: WFL  Extension: 5 degrees     Right Ankle/Foot   Dorsiflexion (ke): -30 degrees   Dorsiflexion (kf): -30 degrees   Plantar flexion: 40 degrees   Inversion: 5 degrees   Eversion: 5 degrees   Great toe extension: 25 degrees     Passive Range of Motion   Left Knee   Extension: 0 degrees     Right Ankle/Foot    Dorsiflexion (ke): -5 degrees   Dorsiflexion (kf): -5 degrees    Plantar flexion: 50 degrees   Inversion: 15 degrees   Eversion: 10 degrees   Great toe extension: 45 degrees     Strength/Myotome Testing     Left Knee   Flexion: 3+  Extension: 3+    Right Ankle/Foot   Dorsiflexion: 3+  Inversion: 2+  Eversion: 2+  Great toe extension: 3+    Tests     Additional Tests Details  (+) TTP Medial Joint Line, M/L Patellar Grooves  SLR WFL  Unable to perform WB assessment due to NWB on L  Incision closed, healing  Wears CAM boot  NWB L LE with B AC  Mod swelling t/o L ankle        Flowsheet Rows    Flowsheet Row Most Recent Value   PT/OT G-Codes   Current Score  30   Projected Score  55   FOTO information reviewed  Yes   Assessment Type  Evaluation   G code set  Other PT/OT Primary   Other PT Primary Current Status ()  CL   Other PT Primary Goal Status ()  CK          Precautions: NWB, Recovering from DVT, On Blood Thinners    Daily Treatment Diary     Manual  4/23            PROM 10'                                                                    Exercise Diary  4/23            HEP 10'            Ankle Alphabet             Ankle AROM 4 way             Toe Curls             Toe Lifts             Quad Sets bolster under ankle             SLR x 4             Ankle Tband 3 way             Seated Wobble Ap, ML             Seated Wobble Cw, Ccw                                                                                                                                                   Modalities              CP

## 2018-04-25 ENCOUNTER — OFFICE VISIT (OUTPATIENT)
Dept: PHYSICAL THERAPY | Facility: CLINIC | Age: 48
End: 2018-04-25
Payer: COMMERCIAL

## 2018-04-25 DIAGNOSIS — M22.2X2 PATELLOFEMORAL DISORDER OF LEFT KNEE: ICD-10-CM

## 2018-04-25 DIAGNOSIS — S86.002D INJURY OF LEFT ACHILLES TENDON, SUBSEQUENT ENCOUNTER: ICD-10-CM

## 2018-04-25 DIAGNOSIS — Z98.890 STATUS POST ACHILLES TENDON REPAIR: Primary | ICD-10-CM

## 2018-04-25 DIAGNOSIS — M22.2X2 PATELLOFEMORAL PAIN SYNDROME OF LEFT KNEE: ICD-10-CM

## 2018-04-25 PROCEDURE — 97112 NEUROMUSCULAR REEDUCATION: CPT

## 2018-04-25 PROCEDURE — 97110 THERAPEUTIC EXERCISES: CPT

## 2018-04-25 PROCEDURE — 97140 MANUAL THERAPY 1/> REGIONS: CPT

## 2018-04-25 NOTE — PROGRESS NOTES
Daily Note     Today's date: 2018  Patient name: Zakiya Hodge  : 1970  MRN: [id number]  Referring provider: Be Howell MD  Dx:   Encounter Diagnosis     ICD-10-CM    1  Status post Achilles tendon repair [id number]    2  Patellofemoral pain syndrome of left knee M22 2X2    3  Injury of left Achilles tendon, subsequent encounter S86 002D    4  Patellofemoral disorder of left knee M22 2X2                   Subjective: Patient states that he has been maximally compliant with HEP with out complaints  Objective: See treatment diary below  Precautions: NWB, Recovering from DVT, On Blood Thinners    Daily Treatment Diary     Manual             PROM 10' 10 min                                                                    Exercise Diary             HEP 10'            Ankle Alphabet  2x           Ankle AROM 4 way  2x10           Toe Curls  3 min            Toe Lifts  2x10           Quad Sets bolster under ankle  :05x20           SLR x 4  X 20            Ankle Tband 3 way             Seated Wobble Ap, ML  2x10           Seated Wobble Cw, Ccw  2x10                                                                                                                                                 Modalities              CP                                             Assessment: Tolerated treatment well  Patient exhibited good technique with therapeutic exercises      Plan: Continue per plan of care

## 2018-04-30 ENCOUNTER — OFFICE VISIT (OUTPATIENT)
Dept: PHYSICAL THERAPY | Facility: CLINIC | Age: 48
End: 2018-04-30
Payer: COMMERCIAL

## 2018-04-30 DIAGNOSIS — S86.002D INJURY OF LEFT ACHILLES TENDON, SUBSEQUENT ENCOUNTER: ICD-10-CM

## 2018-04-30 DIAGNOSIS — M22.2X2 PATELLOFEMORAL PAIN SYNDROME OF LEFT KNEE: ICD-10-CM

## 2018-04-30 DIAGNOSIS — M22.2X2 PATELLOFEMORAL DISORDER OF LEFT KNEE: ICD-10-CM

## 2018-04-30 DIAGNOSIS — Z98.890 STATUS POST ACHILLES TENDON REPAIR: Primary | ICD-10-CM

## 2018-04-30 PROCEDURE — 97110 THERAPEUTIC EXERCISES: CPT

## 2018-04-30 PROCEDURE — 97112 NEUROMUSCULAR REEDUCATION: CPT

## 2018-04-30 PROCEDURE — 97140 MANUAL THERAPY 1/> REGIONS: CPT

## 2018-04-30 NOTE — PROGRESS NOTES
Daily Note     Today's date: 2018  Patient name: Henri Kolb  : 1970  MRN: [id number]  Referring provider: Jose Carlos Harden MD  Dx:   Encounter Diagnosis     ICD-10-CM    1  Status post Achilles tendon repair Z98 890    2  Patellofemoral pain syndrome of left knee M22 2X2    3  Injury of left Achilles tendon, subsequent encounter S86 002D    4  Patellofemoral disorder of left knee M22 2X2                   Subjective: Patient denies pain in ankle/foot following last treatment session  Patient does have some c/o of LBP this weekend      Objective: See treatment diary below  Patient arrived late and was accommodated  Precautions: NWB, Recovering from DVT, On Blood Thinners    Daily Treatment Diary     Manual            PROM 10' 10 min  10 min                                                                   Exercise Diary            HEP 10'            Ankle Alphabet  2x 2x          Ankle AROM 4 way  2x10 2x10          Toe Curls  3 min  3 min           Toe Lifts  2x10 2 x 10           Quad Sets bolster under ankle  :05x20 np- knee pain          SLR x 4  X 20  x20- flex/abd today          Ankle Tband 3 way   YTB x 10 ea          Seated Wobble Ap, ML  2x10 2x10          Seated Wobble Cw, Ccw  2x10 2x10          Standing hip 3 way - R only (NWB)                                                                                                                                      Modalities              CP                                           Assessment: Tolerated treatment well  Patient exhibited good technique with therapeutic exercises      Plan: Continue per plan of care

## 2018-05-01 ENCOUNTER — TELEPHONE (OUTPATIENT)
Dept: OBGYN CLINIC | Facility: HOSPITAL | Age: 48
End: 2018-05-01

## 2018-05-01 NOTE — TELEPHONE ENCOUNTER
Pt  Called stating that after taking a shower this morning he noticed a"dab of bleeding" to incision site  Pt  Is on blood thinners and was concerned  I advised pt  To continue to monitor bleeding by putting a piece of gauze, if gauze gets saturated and bleeding doesn't stop he would need to call us back  At this point pt  Doesn't have excessive bleeding  I advised pt  To pat incision  dry after showering and do not apply any lotions  Pt  Verbalized understanding

## 2018-05-02 ENCOUNTER — OFFICE VISIT (OUTPATIENT)
Dept: PHYSICAL THERAPY | Facility: CLINIC | Age: 48
End: 2018-05-02
Payer: COMMERCIAL

## 2018-05-02 ENCOUNTER — TELEPHONE (OUTPATIENT)
Dept: OBGYN CLINIC | Facility: HOSPITAL | Age: 48
End: 2018-05-02

## 2018-05-02 DIAGNOSIS — S86.002D INJURY OF LEFT ACHILLES TENDON, SUBSEQUENT ENCOUNTER: ICD-10-CM

## 2018-05-02 DIAGNOSIS — Z98.890 STATUS POST ACHILLES TENDON REPAIR: Primary | ICD-10-CM

## 2018-05-02 DIAGNOSIS — M22.2X2 PATELLOFEMORAL DISORDER OF LEFT KNEE: ICD-10-CM

## 2018-05-02 DIAGNOSIS — M22.2X2 PATELLOFEMORAL PAIN SYNDROME OF LEFT KNEE: ICD-10-CM

## 2018-05-02 PROCEDURE — 97140 MANUAL THERAPY 1/> REGIONS: CPT | Performed by: PHYSICAL THERAPIST

## 2018-05-02 PROCEDURE — 97110 THERAPEUTIC EXERCISES: CPT | Performed by: PHYSICAL THERAPIST

## 2018-05-02 PROCEDURE — 97112 NEUROMUSCULAR REEDUCATION: CPT | Performed by: PHYSICAL THERAPIST

## 2018-05-02 NOTE — PROGRESS NOTES
Daily Note     Today's date: 2018  Patient name: Jh Hudson  : 1970  MRN: [id number]  Referring provider: Sagar Obrien MD  Dx:   Encounter Diagnosis     ICD-10-CM    1  Status post Achilles tendon repair Z98 890    2  Patellofemoral pain syndrome of left knee M22 2X2    3  Injury of left Achilles tendon, subsequent encounter S86 002D    4  Patellofemoral disorder of left knee M22 2X2                    Subjective: Patient states that he is experiencing less pain in LB today and was able to complete SLR today without complaints  No complaints of increased ankle pain since last treatment session  Objective: See treatment diary below  Precautions: NWB, Recovering from DVT, On Blood Thinners    Daily Treatment Diary     Manual            PROM 10' 10 min  10 min                                                                   Exercise Diary           HEP 10'            Ankle Alphabet  2x 2x 2x         Ankle AROM 4 way  2x10 2x10 2x10         Toe Curls  3 min  3 min  3 min          Toe Lifts  2x10 2 x 10  2 x 10         Quad Sets bolster under ankle  :05x20 np- knee pain          SLR x 4  X 20  x20- flex/abd today x20         Ankle Tband 3 way   YTB x 10 ea YTB x 20         Seated Wobble Ap, ML  2x10 2x10 x20         Seated Wobble Cw, Ccw  2x10 2x10 x20         Standing hip 3 way - R only (NWB)    1x10                                                                                                                                  Modalities              CP                                           Assessment: Tolerated treatment well  Patient exhibited good technique with therapeutic exercises      Plan: Continue per plan of care

## 2018-05-07 ENCOUNTER — OFFICE VISIT (OUTPATIENT)
Dept: PHYSICAL THERAPY | Facility: CLINIC | Age: 48
End: 2018-05-07
Payer: COMMERCIAL

## 2018-05-07 DIAGNOSIS — Z98.890 STATUS POST ACHILLES TENDON REPAIR: Primary | ICD-10-CM

## 2018-05-07 DIAGNOSIS — S86.002D INJURY OF LEFT ACHILLES TENDON, SUBSEQUENT ENCOUNTER: ICD-10-CM

## 2018-05-07 DIAGNOSIS — M22.2X2 PATELLOFEMORAL PAIN SYNDROME OF LEFT KNEE: ICD-10-CM

## 2018-05-07 DIAGNOSIS — M22.2X2 PATELLOFEMORAL DISORDER OF LEFT KNEE: ICD-10-CM

## 2018-05-07 PROCEDURE — 97140 MANUAL THERAPY 1/> REGIONS: CPT | Performed by: PHYSICAL THERAPIST

## 2018-05-07 PROCEDURE — 97112 NEUROMUSCULAR REEDUCATION: CPT | Performed by: PHYSICAL THERAPIST

## 2018-05-07 PROCEDURE — 97110 THERAPEUTIC EXERCISES: CPT | Performed by: PHYSICAL THERAPIST

## 2018-05-07 NOTE — PROGRESS NOTES
Daily Note     Today's date: 2018  Patient name: Dylan Ruano  : 1970  MRN: [id number]  Referring provider: Cachorro Logan MD  Dx:   Encounter Diagnosis     ICD-10-CM    1  Status post Achilles tendon repair Z98 890    2  Patellofemoral pain syndrome of left knee M22 2X2    3  Patellofemoral disorder of left knee M22 2X2    4  Injury of left Achilles tendon, subsequent encounter S86 002D                   Subjective: Pt has been compliant with HEP and current precuations      Objective: See treatment diary below  Precautions: NWB, Recovering from DVT, On Blood Thinners    Daily Treatment Diary     Manual           PROM 10' 10 min  10 min  10'                                                                 Exercise Diary          HEP 10'            Ankle Alphabet  2x 2x 2x 2x        Ankle AROM 4 way  2x10 2x10 2x10 2x10        Toe Curls  3 min  3 min  3 min  3'        Toe Lifts  2x10 2 x 10  2 x 10 2x10        Quad Sets bolster under ankle  :05x20 np- knee pain          SLR x 4  X 20  x20- flex/abd today x20 20x 1 5#        Ankle Tband 3 way   YTB x 10 ea YTB x 20 YTB 20x        Seated Wobble Ap, ML  2x10 2x10 x20 20x        Seated Wobble Cw, Ccw  2x10 2x10 x20 20x        Standing hip 3 way - R only (NWB)    1x10 1 5# 20x                                                                                                                                 Modalities              CP                                           Assessment: Tolerated treatment well  Patient would benefit from continued PT    ROM progressing well    Plan: Continue per plan of care

## 2018-05-09 ENCOUNTER — OFFICE VISIT (OUTPATIENT)
Dept: PHYSICAL THERAPY | Facility: CLINIC | Age: 48
End: 2018-05-09
Payer: COMMERCIAL

## 2018-05-09 DIAGNOSIS — M22.2X2 PATELLOFEMORAL DISORDER OF LEFT KNEE: ICD-10-CM

## 2018-05-09 DIAGNOSIS — Z98.890 STATUS POST ACHILLES TENDON REPAIR: Primary | ICD-10-CM

## 2018-05-09 DIAGNOSIS — M22.2X2 PATELLOFEMORAL PAIN SYNDROME OF LEFT KNEE: ICD-10-CM

## 2018-05-09 DIAGNOSIS — S86.002D INJURY OF LEFT ACHILLES TENDON, SUBSEQUENT ENCOUNTER: ICD-10-CM

## 2018-05-09 PROCEDURE — 97112 NEUROMUSCULAR REEDUCATION: CPT

## 2018-05-09 PROCEDURE — 97140 MANUAL THERAPY 1/> REGIONS: CPT

## 2018-05-09 PROCEDURE — 97110 THERAPEUTIC EXERCISES: CPT

## 2018-05-09 NOTE — PROGRESS NOTES
Daily Note     Today's date: 2018  Patient name: Tom Heck  : 1970  MRN: [id number]  Referring provider: Mai Goodell, MD  Dx:   Encounter Diagnosis     ICD-10-CM    1  Status post Achilles tendon repair Z98 890    2  Patellofemoral pain syndrome of left knee M22 2X2    3  Patellofemoral disorder of left knee M22 2X2    4  Injury of left Achilles tendon, subsequent encounter S86 002D                   Subjective: Pt has been compliant with HEP and notes that he is ready to start weight bearing on that leg  Objective: See treatment diary below  Precautions: NWB, Recovering from DVT, On Blood Thinners    Daily Treatment Diary     Manual          PROM 10' 10 min  10 min  10' 10'                                                                Exercise Diary         HEP 10'            Ankle Alphabet  2x 2x 2x 2x 2x       Ankle AROM 4 way  2x10 2x10 2x10 2x10        Toe Curls  3 min  3 min  3 min  3' 3'       Toe Lifts  2x10 2 x 10  2 x 10 2x10 2x10       Quad Sets bolster under ankle  :05x20 np- knee pain          SLR x 4  X 20  x20- flex/abd today x20 20x 1 5# 20x 1 5#       Ankle Tband 3 way   YTB x 10 ea YTB x 20 YTB 20x YTB 20x       Seated Wobble Ap, ML  2x10 2x10 x20 20x 20x       Seated Wobble Cw, Ccw  2x10 2x10 x20 20x 20x       Standing hip 3 way - R only (NWB)    1x10 1 5# 20x 20x                                                                                                                                Modalities              CP                                           Assessment: Tolerated treatment well  Patient would benefit from continued PT    ROM progressing well  Fatigue still noted at end of session  Plan: Continue per plan of care

## 2018-05-14 ENCOUNTER — HOSPITAL ENCOUNTER (EMERGENCY)
Facility: HOSPITAL | Age: 48
Discharge: HOME | End: 2018-05-14
Attending: EMERGENCY MEDICINE | Admitting: EMERGENCY MEDICINE
Payer: COMMERCIAL

## 2018-05-14 ENCOUNTER — TELEPHONE (OUTPATIENT)
Dept: OBGYN CLINIC | Facility: HOSPITAL | Age: 48
End: 2018-05-14

## 2018-05-14 ENCOUNTER — APPOINTMENT (OUTPATIENT)
Dept: PHYSICAL THERAPY | Facility: CLINIC | Age: 48
End: 2018-05-14
Payer: COMMERCIAL

## 2018-05-14 VITALS
TEMPERATURE: 98.1 F | DIASTOLIC BLOOD PRESSURE: 102 MMHG | OXYGEN SATURATION: 100 % | HEART RATE: 69 BPM | SYSTOLIC BLOOD PRESSURE: 168 MMHG | RESPIRATION RATE: 16 BRPM

## 2018-05-14 DIAGNOSIS — T81.31XA WOUND DEHISCENCE, SURGICAL, INITIAL ENCOUNTER: Primary | ICD-10-CM

## 2018-05-14 PROCEDURE — 99282 EMERGENCY DEPT VISIT SF MDM: CPT

## 2018-05-14 NOTE — ED PROVIDER NOTES
History  Chief Complaint   Patient presents with    Wound Check     patient had surgery on achilles tendon, reports one of the sutures opened up this Am     Patient had achilles repair by Dr Eneida Lang 1 month ago  He had some small dehiscence of his surgical would which he noticed was worse last night after his shower  He reports blood and pus coming from the wound  He denies fevers or surrounding redness or streaking  On anticoagulation for blood clot        History provided by:  Patient  Wound Check    Treatments since wound repair include regular soap and water washings  His temperature was unmeasured prior to arrival  There has been bloody discharge from the wound  There is no redness present  There is no swelling present  The pain has improved  He has no difficulty moving the affected extremity or digit  Prior to Admission Medications   Prescriptions Last Dose Informant Patient Reported? Taking?    Butalbital-APAP-Caffeine (FIORICET PO)   Yes Yes   Sig: Take 1 tablet by mouth daily as needed Unknown dose     Losartan Potassium (COZAAR PO)  Self Yes Yes   Sig: Take 100 mg by mouth daily     amLODIPine (NORVASC) 10 mg tablet  Self Yes Yes   Sig: Take 10 mg by mouth every evening     apixaban (ELIQUIS) 5 mg   No Yes   Sig: Take 2 tablets (10 mg total) by mouth 2 (two) times a day for 7 days   methocarbamol (ROBAXIN) 750 mg tablet  Self Yes Yes   Sig: Take 750 mg by mouth 4 (four) times a day   metoprolol succinate (TOPROL-XL) 50 mg 24 hr tablet  Self Yes Yes   Sig: "I take one a day bedtime"      Facility-Administered Medications: None       Past Medical History:   Diagnosis Date    Chronic back pain     Fibromyalgia     Hypertension     RA (rheumatoid arthritis) (HCC)        Past Surgical History:   Procedure Laterality Date    CARPAL TUNNEL RELEASE Left     LATERAL EPICONDYLE RELEASE Right 2/25/2016    Procedure: RELEASE EPICONDYLAR ELBOW, LATERAL;  Surgeon: Jose Mcleod MD;  Location: [facility];  Service:     AR REPAIR ACHILLES TENDON,PRIMARY Left 4/2/2018    Procedure: REPAIR TENDON ACHILLES;  Surgeon: Magan Quinn MD;  [facility];  Service: Orthopedics    ROTATOR CUFF REPAIR Left     ULNAR TUNNEL RELEASE Left     x2       Family History   Problem Relation Age of Onset    Hypertension Mother     Diabetes Mother      I have reviewed and agree with the history as documented  Social History   Substance Use Topics    Smoking status: Never Smoker    Smokeless tobacco: Never Used    Alcohol use No        Review of Systems   Constitutional: Negative for fever  HENT: Negative for facial swelling  Eyes: Negative for redness  Respiratory: Negative for shortness of breath  Cardiovascular: Negative for chest pain  Gastrointestinal: Negative for abdominal pain  Musculoskeletal: Negative for back pain  Skin: Positive for wound  Negative for rash  Neurological: Negative for headaches  Psychiatric/Behavioral: Negative for confusion  Physical Exam  ED Triage Vitals   Temperature Pulse Respirations Blood Pressure SpO2   05/14/18 1023 05/14/18 1023 05/14/18 1023 05/14/18 1025 05/14/18 1023   98 1 °F (36 7 °C) 69 16 (!) 168/102 100 %      Temp Source Heart Rate Source Patient Position - Orthostatic VS BP Location FiO2 (%)   05/14/18 1023 -- -- -- --   Oral          Pain Score       05/14/18 1023       No Pain           Orthostatic Vital Signs  Vitals:    05/14/18 1023 05/14/18 1025   BP:  (!) 168/102   Pulse: 69        Physical Exam   Constitutional: He is oriented to person, place, and time  He appears well-developed and well-nourished  HENT:   Head: Normocephalic and atraumatic  Eyes: Conjunctivae and EOM are normal    Neck: Normal range of motion  Pulmonary/Chest: Effort normal    Musculoskeletal: Normal range of motion  Neurological: He is alert and oriented to person, place, and time  Skin: Skin is warm and dry     Left ankle, proximal aspect of the surgical wound there is a 2 x 2 mm dehiscence with minor oozing of serosanguinous drainage upon dabbing with gauze   Psychiatric: He has a normal mood and affect  His behavior is normal        ED Medications  Medications - No data to display    Diagnostic Studies  Results Reviewed     None                 No orders to display              Procedures  Lac Repair  Date/Time: 5/14/2018 10:55 AM  Performed by: Adrien Blas  Authorized by: Alexandru RASHID   Consent: Verbal consent obtained  Consent given by: patient  Patient understanding: patient states understanding of the procedure being performed  Patient identity confirmed: verbally with patient  Body area: lower extremity  Location details: left ankle  Laceration length: 0 2 cm  Tendon involvement: none  Nerve involvement: none  Vascular damage: no    Sedation:  Patient sedated: no    Wound Dehiscence:  Superficial Wound Dehiscence: simple closure      Procedure Details:  Skin closure: glue  Approximation: loose  Approximation difficulty: simple  Patient tolerance: Patient tolerated the procedure well with no immediate complications             Phone Contacts  ED Phone Contact    ED Course                               MDM  CritCare Time    Disposition  Final diagnoses:   Wound dehiscence, surgical, initial encounter     Time reflects when diagnosis was documented in both MDM as applicable and the Disposition within this note     Time User Action Codes Description Comment    5/14/2018 10:57 AM Cedrick Carbone Add [T81 [id number]] Wound dehiscence, surgical, initial encounter       ED Disposition     ED Disposition Condition Comment    Discharge  Blayne Organ discharge to home/self care      Condition at discharge: Stable        Follow-up Information     Follow up With Specialties Details Why [address]  Emergency Department Emergency Medicine  As needed, If symptoms worsen [address] [facility] ED, [address] [place] [address], [place], [place], [address] Heber Myrick MD Orthopedic Surgery Go to  [address]  [phone]           Patient's Medications   Discharge Prescriptions    No medications on file     No discharge procedures on file      ED Provider  Electronically Signed by           Rula Menard PA-C  05/14/18 [facility] Leanord Olszewski, PA-C  05/14/18 5055

## 2018-05-14 NOTE — TELEPHONE ENCOUNTER
Maxine Deleon called back and said that ER  glued his incision  Did not think he needed abx at this time  He said that he is having pain at the site    He has a followup appt on 5/17

## 2018-05-14 NOTE — TELEPHONE ENCOUNTER
Thank you for the information   Can you make sure we follow up on this and have an appointment with Dr Selene Tran scheduled

## 2018-05-14 NOTE — DISCHARGE INSTRUCTIONS
Wound Dehiscence   WHAT YOU NEED TO KNOW:   Wound dehiscence is when part or all of a wound comes apart  You may need medicine, wound care, surgery, or wound devices to help treat your wound  Wound dehiscence can become life-threatening  DISCHARGE INSTRUCTIONS:   Return to the emergency department if:   · Your heart is beating faster than usual, or you feel dizzy or lightheaded  · Blood soaks through your bandage  · You see tissue coming through your wound  · You feel like your wound is opening up more  · Your wound oozes yellow or green pus, looks swollen or red, or feels warm  Contact your healthcare provider or surgeon if:   · You have a fever or chills  · Your wound leaks fluid or a small amount of blood  · Your pain gets worse or does not get better after you take pain medicine  · You have nausea or are vomiting  · You have questions or concerns about your condition or care  Medicines:   · Antibiotics  help treat a bacterial infection  · Prescription pain medicine  may be given  Ask your healthcare provider how to take this medicine safely  Some prescription pain medicines contain acetaminophen  Do not take other medicines that contain acetaminophen without talking to your healthcare provider  Too much acetaminophen may cause liver damage  Prescription pain medicine may cause constipation  Ask your healthcare provider how to prevent or treat constipation  · Take your medicine as directed  Contact your healthcare provider if you think your medicine is not helping or if you have side effects  Tell him or her if you are allergic to any medicine  Keep a list of the medicines, vitamins, and herbs you take  Include the amounts, and when and why you take them  Bring the list or the pill bottles to follow-up visits  Carry your medicine list with you in case of an emergency  Wound care:   · Wash your hands often  Use soap and water   Wash your hands before and after you touch your wound  This will help to prevent an infection  · Clean your wound as directed  Ask your healthcare provider if it okay to shower or take a bath  Let the soap and water run over your wound  Gently pat the area dry  Look for signs of infection, such as redness, swelling, or pus  · Change your bandages as directed  Replace bandages after you clean the wound or bathe  Change your bandages when they get wet or dirty  If directed, pack your wound  Change the packing as directed  · Do not swim or go in hot tubs until your healthcare provider says it is okay  Hot tubs and pools can cause infection and prevent wound healing  · Wear your binder or splint at all times or as directed  These devices help hold your wound together  · Use devices as directed to help the wound heal   Your healthcare provider will show you how to care for your wound device  Self-care to promote healing:   · Rest as directed  Do not lift anything heavier than 5 pounds  Do not do activities that may put stress on your wound, such as running or sports  Ask your healthcare provider when you can return to your usual activities  · Eat foods high in protein  Protein will help your wound heal  Protein can be found in lean meat, fish, beans, and low-fat dairy  Your healthcare provider may also recommend certain drinks for added protein  · Do not smoke  Nicotine and other chemicals in cigarettes and cigars can prevent your wound from healing  Ask your healthcare provider for information if you currently smoke and need help to quit  E-cigarettes or smokeless tobacco still contain nicotine  Talk to your healthcare provider before you use these products  Follow up with your healthcare provider or surgeon as directed: You will need to return to have your wound checked  Write down your questions so you remember to ask them during your visits    © 2017 [employer] Information is for End User's use only and may not be sold, redistributed or otherwise used for commercial purposes  All illustrations and images included in CareNotes® are the copyrighted property of A D A M , Inc  or Maury Amaral  The above information is an  only  It is not intended as medical advice for individual conditions or treatments  Talk to your doctor, nurse or pharmacist before following any medical regimen to see if it is safe and effective for you

## 2018-05-14 NOTE — TELEPHONE ENCOUNTER
Dr Shermon Hodgkin patient - L Achilles Tendon Sx 4/2 Incision problem    The top of patient's incision opened up and is bleeding and having thick drainage and is red around incision  Patient agreed to go to ER for evaluation as Dr Shermon Hodgkin is not in the office today    FYI

## 2018-05-15 ENCOUNTER — OFFICE VISIT (OUTPATIENT)
Dept: OBGYN CLINIC | Facility: HOSPITAL | Age: 48
End: 2018-05-15

## 2018-05-15 VITALS
DIASTOLIC BLOOD PRESSURE: 105 MMHG | HEART RATE: 65 BPM | WEIGHT: 174 LBS | BODY MASS INDEX: 27.31 KG/M2 | SYSTOLIC BLOOD PRESSURE: 169 MMHG | HEIGHT: 67 IN

## 2018-05-15 DIAGNOSIS — S86.002D INJURY OF LEFT ACHILLES TENDON, SUBSEQUENT ENCOUNTER: ICD-10-CM

## 2018-05-15 DIAGNOSIS — Z48.89 AFTERCARE FOLLOWING SURGERY: Primary | ICD-10-CM

## 2018-05-15 PROCEDURE — 99024 POSTOP FOLLOW-UP VISIT: CPT | Performed by: ORTHOPAEDIC SURGERY

## 2018-05-15 NOTE — PROGRESS NOTES
53 y o male presents to the office 6 weeks status post left achilles tendon repair on 04/02/2018  He has noticed an opening which took him to the ER where they glued it temporarily  He denies any fevers or chills  He has been compliant with his cam walker boot  He has been progressing with physical therapy  Review of Systems  Review of systems negative unless otherwise specified in HPI    Past Medical History  Past Medical History:   Diagnosis Date    Chronic back pain     Fibromyalgia     Hypertension     RA (rheumatoid arthritis) ([address] )        Past Surgical History  Past Surgical History:   Procedure Laterality Date    CARPAL TUNNEL RELEASE Left     LATERAL EPICONDYLE RELEASE Right 2/25/2016    Procedure: RELEASE EPICONDYLAR ELBOW, LATERAL;  Surgeon: Dean Mota MD;  [facility];  Service:     DC REPAIR ACHILLES TENDON,PRIMARY Left 4/2/2018    Procedure: REPAIR TENDON ACHILLES;  Surgeon: Jayce Velazquez MD;  [facility];  Service: Orthopedics    ROTATOR CUFF REPAIR Left     ULNAR TUNNEL RELEASE Left     x2       Current Medications  Current Outpatient Prescriptions on File Prior to Visit   Medication Sig Dispense Refill    amLODIPine (NORVASC) 10 mg tablet Take 10 mg by mouth every evening        apixaban (ELIQUIS) 5 mg Take 2 tablets (10 mg total) by mouth 2 (two) times a day for 7 days 13 tablet 0    Butalbital-APAP-Caffeine (FIORICET PO) Take 1 tablet by mouth daily as needed Unknown dose        Losartan Potassium (COZAAR PO) Take 100 mg by mouth daily        methocarbamol (ROBAXIN) 750 mg tablet Take 750 mg by mouth 4 (four) times a day      metoprolol succinate (TOPROL-XL) 50 mg 24 hr tablet "I take one a day bedtime"       No current facility-administered medications on file prior to visit          Recent Labs [facility])    0  Lab Value Date/Time   HCT 48 0 02/09/2018 2239   HCT 47 8 10/27/2015 1646   HGB 17 1 (H) 02/09/2018 2239   HGB 17 0 10/27/2015 1646   WBC 5 33 02/09/2018 2239   WBC 10 38 (H) 10/27/2015 1646   INR 1 04 04/02/2018 0745   INR 0 99 10/27/2015 1646   GLUCOSE 98 02/09/2018 2239   GLUCOSE 91 10/27/2015 1646         Physical exam  · General: Awake, Alert, Oriented  · Eyes: Pupils equal, round and reactive to light  · Heart: regular rate and rhythm  · Lungs: No audible wheezing  · Abdomen: soft  left lower extremity  · Patient ambulates with the assistance of crutches and cam walker boot  · Skin is warm and well perfused  · Good capillary refill  · Incision closed, appears to be extrusion of vicryl suture      Imaging  None    Procedure  None    Assessment/Plan:   48 y o male is 6 weeks status post achilles tendon repair  He can progress in physical therapy to 25% weightbearing in his cam walker boot  We will see him back in 3 weeks

## 2018-05-15 NOTE — TELEPHONE ENCOUNTER
Wound opened up again this morning  I told him to apply a dressing and hold some pressure on to stop the bleeding  I put him in for 11am today with Dr Fatoumata Bolton

## 2018-05-16 ENCOUNTER — OFFICE VISIT (OUTPATIENT)
Dept: PHYSICAL THERAPY | Facility: CLINIC | Age: 48
End: 2018-05-16
Payer: COMMERCIAL

## 2018-05-16 DIAGNOSIS — S86.002D INJURY OF LEFT ACHILLES TENDON, SUBSEQUENT ENCOUNTER: ICD-10-CM

## 2018-05-16 DIAGNOSIS — M22.2X2 PATELLOFEMORAL PAIN SYNDROME OF LEFT KNEE: ICD-10-CM

## 2018-05-16 DIAGNOSIS — Z98.890 STATUS POST ACHILLES TENDON REPAIR: Primary | ICD-10-CM

## 2018-05-16 DIAGNOSIS — M22.2X2 PATELLOFEMORAL DISORDER OF LEFT KNEE: ICD-10-CM

## 2018-05-16 PROCEDURE — 97110 THERAPEUTIC EXERCISES: CPT

## 2018-05-16 PROCEDURE — 97140 MANUAL THERAPY 1/> REGIONS: CPT

## 2018-05-16 PROCEDURE — 97112 NEUROMUSCULAR REEDUCATION: CPT

## 2018-05-16 NOTE — PROGRESS NOTES
Daily Note     Today's date: 2018  Patient name: Jh Hudson  : 1970  MRN: [id number]  Referring provider: Sagar Obrien MD  Dx:   Encounter Diagnosis     ICD-10-CM    1  Status post Achilles tendon repair Z98 890    2  Patellofemoral pain syndrome of left knee M22 2X2    3  Patellofemoral disorder of left knee M22 2X2    4  Injury of left Achilles tendon, subsequent encounter S86 002D                   Subjective: Patient saw MD yesterday who is please with his progress and is allowed to progress to 25% Wb'ing  Objective: See treatment diary below  Precautions: NWB, Recovering from DVT, On Blood Thinners    Daily Treatment Diary     Manual         PROM 10' 10 min  10 min  10' 10' 10                                                               Exercise Diary   5/      HEP 10'            Ankle Alphabet  2x 2x 2x 2x 2x       Ankle AROM 4 way  2x10 2x10 2x10 2x10        Toe Curls  3 min  3 min  3 min  3' 3' 3 min      Toe Lifts  2x10 2 x 10  2 x 10 2x10 2x10 2x10      Quad Sets bolster under ankle  :05x20 np- knee pain          SLR x 4  X 20  x20- flex/abd today x20 20x 1 5# 20x 1 5# 1 5#x20      Ankle Tband 3 way   YTB x 10 ea YTB x 20 YTB 20x YTB 20x np      Seated Wobble Ap, ML  2x10 2x10 x20 20x 20x 20x      Seated Wobble Cw, Ccw  2x10 2x10 x20 20x 20x 20x      Standing hip 3 way - R only (NWB)    1x10 1 5# 20x 20x 2# x20       Weight shifting - 25%       :10x20      Walking laps -25% WB       3 laps                                                                                                     Modalities              CP 10                                         Assessment: Tolerated treatment well  Patient exhibited good technique with therapeutic exercises      Plan: Continue per plan of care

## 2018-05-21 ENCOUNTER — EVALUATION (OUTPATIENT)
Dept: PHYSICAL THERAPY | Facility: CLINIC | Age: 48
End: 2018-05-21
Payer: COMMERCIAL

## 2018-05-21 DIAGNOSIS — M22.2X2 PATELLOFEMORAL DISORDER OF LEFT KNEE: ICD-10-CM

## 2018-05-21 DIAGNOSIS — Z98.890 STATUS POST ACHILLES TENDON REPAIR: Primary | ICD-10-CM

## 2018-05-21 DIAGNOSIS — S86.002D INJURY OF LEFT ACHILLES TENDON, SUBSEQUENT ENCOUNTER: ICD-10-CM

## 2018-05-21 DIAGNOSIS — M22.2X2 PATELLOFEMORAL PAIN SYNDROME OF LEFT KNEE: ICD-10-CM

## 2018-05-21 PROCEDURE — 97140 MANUAL THERAPY 1/> REGIONS: CPT | Performed by: PHYSICAL THERAPIST

## 2018-05-21 PROCEDURE — 97112 NEUROMUSCULAR REEDUCATION: CPT | Performed by: PHYSICAL THERAPIST

## 2018-05-21 PROCEDURE — G8990 OTHER PT/OT CURRENT STATUS: HCPCS | Performed by: PHYSICAL THERAPIST

## 2018-05-21 PROCEDURE — G8991 OTHER PT/OT GOAL STATUS: HCPCS | Performed by: PHYSICAL THERAPIST

## 2018-05-21 PROCEDURE — 97110 THERAPEUTIC EXERCISES: CPT | Performed by: PHYSICAL THERAPIST

## 2018-05-21 NOTE — PROGRESS NOTES
Daily Note     Today's date: 2018  Patient name: Jose G Villarreal  : 1970  MRN: [id number]  Referring provider: Abbie Cavanaugh MD  Dx:   Encounter Diagnosis     ICD-10-CM    1  Status post Achilles tendon repair Z98 890    2  Patellofemoral pain syndrome of left knee M22 2X2    3  Patellofemoral disorder of left knee M22 2X2    4  Injury of left Achilles tendon, subsequent encounter S86 002D                   Subjective:See RE       Objective: See treatment diary below  Precautions: NWB, Recovering from DVT, On Blood Thinners    Daily Treatment Diary     Manual        PROM 10' 10 min  10 min  10' 10' 10 10                                                              Exercise Diary       HEP 10'            Ankle Alphabet  2x 2x 2x 2x 2x       Ankle AROM 4 way  2x10 2x10 2x10 2x10        Toe Curls  3 min  3 min  3 min  3' 3' 3 min 3'     Toe Lifts  2x10 2 x 10  2 x 10 2x10 2x10 2x10 2x10     Quad Sets bolster under ankle  :05x20 np- knee pain          SLR x 4  X 20  x20- flex/abd today x20 20x 1 5# 20x 1 5# 1 5#x20 1 5 # x20     Ankle Tband 3 way   YTB x 10 ea YTB x 20 YTB 20x YTB 20x np      Seated Wobble Ap, ML  2x10 2x10 x20 20x 20x 20x 20x     Seated Wobble Cw, Ccw  2x10 2x10 x20 20x 20x 20x 20x     Standing hip 3 way - R only (NWB)    1x10 1 5# 20x 20x 2# x20  #2 x20     Weight shifting - 25%       :10x20 :05x20     Walking laps -25% WB       3 laps                                                                                                     Modalities              CP 10                                         Assessment: Tolerated treatment well  Patient exhibited good technique with therapeutic exercises  He declined ice this visit  Plan: Continue per plan of care

## 2018-05-21 NOTE — PROGRESS NOTES
PT Re-Evaluation    Today's date: 2018  Patient name: Verenice Pendleton  : 1970  MRN: [id number]  Referring provider: Josué Lockhart MD  Dx:   Encounter Diagnosis     ICD-10-CM    1  Status post Achilles tendon repair Z98 890    2  Patellofemoral pain syndrome of left knee M22 2X2    3  Patellofemoral disorder of left knee M22 2X2    4  Injury of left Achilles tendon, subsequent encounter S86 002D                   Assessment  Impairments: abnormal gait, abnormal or restricted ROM, impaired physical strength and pain with function    Assessment details: Patient has been compliant with attending PT and home exercise program since initial eval   He  has made progress towards his goals and improvements in objective data since initial eval but is still limited compared to prior level of function  He continues with to have above listed impairments and would benefit from additional skilled PT to address these deficits to return to maximal level of function  Thank you for this pleasant referral       Understanding of Dx/Px/POC: good   Prognosis: good    Goals  ST-6 weeks  1  Patient to be independent with HEP  -Met  2  Decrease pain at least 2 subjective levels  Met     LT-12 weeks  1    Patient to voice comfort with self management of condition -Met  2   75% or > decreased pain -Partially Met  3   75% or > decreased functional deficits -Partially Met  4  Normalize AROM of all deficit planes -Partially Met  5  Normalize strength -Not Met  6  Functional Status Score: 55 -Not Met  7  Patient to voice understanding of activities/positions to avoid  - Met  8    Normalize Gait- Not Met    Plan  Patient would benefit from: skilled PT  Referral necessary: No  Planned modality interventions: cryotherapy and TENS  Planned therapy interventions: IADL retraining, joint mobilization, manual therapy, motor coordination training, neuromuscular re-education, patient education, self care, strengthening, stretching, therapeutic activities, therapeutic exercise, home exercise program, flexibility, ADL training, balance, body mechanics training, balance/weight bearing training and gait training  Frequency: 2x week  Duration in weeks: 8  Treatment plan discussed with: patient        Subjective Evaluation    History of Present Illness  Date of onset: 3/29/2018  Date of surgery: 2018  Mechanism of injury: Chief Complaint: L achilles pain, limited mobility    History: Pt recently RT MD and was allowed to increase to 25% WB which he has been doing consistently, using his scale at home to assure proper WB  He denies significant pain and he has been compliant with HEP and post surgical precautions  He notes a 30 min tolerance to WB limited by R LE and arms from [facility] use        PMH: L knee pain, DVT, blood thinners x 3 mo, carpal tunnel surgeries, RTC surgeries     Aggravating factors: difficulty sleeping (comfortable positioning himself)  Relieving factors: rest    Functional Deficits: Walking, Going to the gym, standing prolonged, sleeping     Patient Goals: Get back to standing, normal walking    Quality of life: good    Pain  At best pain ratin  At worst pain ratin  Location: [place]      Diagnostic Tests  MRI studies: abnormal        Objective     Active Range of Motion   Left Knee   Flexion: WFL  Extension: 5 degrees     Right Ankle/Foot   Dorsiflexion (ke): -5 degrees   Dorsiflexion (kf): -5 degrees   Plantar flexion: 50 degrees   Inversion: 20 degrees   Eversion: 10 degrees   Great toe extension: 50 degrees     Passive Range of Motion   Left Knee   Extension: 0 degrees     Right Ankle/Foot    Dorsiflexion (ke): 5 degrees   Dorsiflexion (kf): 5 degrees    Plantar flexion: 60 degrees   Inversion: 35 degrees   Eversion: 25 degrees   Great toe extension: 70 degrees     Strength/Myotome Testing     Left Knee   Flexion: 3+  Extension: 3+    Right Ankle/Foot   Dorsiflexion: 4-  Inversion: 3+  Eversion: 3+  Ebony Jimmy toe extension: 3+    Tests     Additional Tests Details  (+) TTP Medial Joint Line, M/L Patellar Grooves  SLR WFL    Incision closed, healing  Wears CAM boot  25% WB with L LE with B AC  Min swelling t/o L ankle            Precautions: 25% WB, Recovering from DVT, On Blood Thinners    Daily Treatment Diary

## 2018-05-23 ENCOUNTER — OFFICE VISIT (OUTPATIENT)
Dept: PHYSICAL THERAPY | Facility: CLINIC | Age: 48
End: 2018-05-23
Payer: COMMERCIAL

## 2018-05-23 DIAGNOSIS — M22.2X2 PATELLOFEMORAL PAIN SYNDROME OF LEFT KNEE: ICD-10-CM

## 2018-05-23 DIAGNOSIS — M22.2X2 PATELLOFEMORAL DISORDER OF LEFT KNEE: ICD-10-CM

## 2018-05-23 DIAGNOSIS — S86.002D INJURY OF LEFT ACHILLES TENDON, SUBSEQUENT ENCOUNTER: ICD-10-CM

## 2018-05-23 DIAGNOSIS — Z98.890 STATUS POST ACHILLES TENDON REPAIR: Primary | ICD-10-CM

## 2018-05-23 PROCEDURE — 97112 NEUROMUSCULAR REEDUCATION: CPT

## 2018-05-23 PROCEDURE — 97110 THERAPEUTIC EXERCISES: CPT

## 2018-05-23 PROCEDURE — 97140 MANUAL THERAPY 1/> REGIONS: CPT

## 2018-05-23 NOTE — PROGRESS NOTES
Daily Note     Today's date: 2018  Patient name: Steve Rice  : 1970  MRN: [id number]  Referring provider: Roscoe Torres MD  Dx:   Encounter Diagnosis     ICD-10-CM    1  Status post Achilles tendon repair Z98 890    2  Patellofemoral pain syndrome of left knee M22 2X2    3  Patellofemoral disorder of left knee M22 2X2    4  Injury of left Achilles tendon, subsequent encounter S86 002D                   Subjective: Patient denies pain  States that he has been compliant with HEP and weight bearing restrictions  Objective: See treatment diary below  Manual       PROM 10' 10 min  10 min  10' 10' 10 10                                                              Exercise Diary      HEP 10'            Ankle Alphabet  2x 2x 2x 2x 2x       Ankle AROM 4 way  2x10 2x10 2x10 2x10        Toe Curls  3 min  3 min  3 min  3' 3' 3 min 3' 3'    Toe Lifts  2x10 2 x 10  2 x 10 2x10 2x10 2x10 2x10 2x10    Quad Sets bolster under ankle  :05x20 np- knee pain          SLR x 4  X 20  x20- flex/abd today x20 20x 1 5# 20x 1 5# 1 5#x20 1 5 # x20 2# x 20     Ankle Tband 3 way   YTB x 10 ea YTB x 20 YTB 20x YTB 20x np  NP    Seated Wobble Ap, ML  2x10 2x10 x20 20x 20x 20x 20x 20x    Seated Wobble Cw, Ccw  2x10 2x10 x20 20x 20x 20x 20x 20x    Standing hip 3 way - R only (NWB)    1x10 1 5# 20x 20x 2# x20  #2 x20 2# x 20    Weight shifting - 25%       :10x20 :05x20 :05x20    Walking laps -25% WB       3 laps  3 laps                                                                                                   Modalities              CP 10                                           Assessment: Tolerated treatment well  Patient exhibited good technique with therapeutic exercises      Plan: Continue per plan of care

## 2018-05-30 ENCOUNTER — OFFICE VISIT (OUTPATIENT)
Dept: PHYSICAL THERAPY | Facility: CLINIC | Age: 48
End: 2018-05-30
Payer: COMMERCIAL

## 2018-05-30 DIAGNOSIS — Z98.890 STATUS POST ACHILLES TENDON REPAIR: Primary | ICD-10-CM

## 2018-05-30 DIAGNOSIS — M22.2X2 PATELLOFEMORAL DISORDER OF LEFT KNEE: ICD-10-CM

## 2018-05-30 DIAGNOSIS — S86.002D INJURY OF LEFT ACHILLES TENDON, SUBSEQUENT ENCOUNTER: ICD-10-CM

## 2018-05-30 DIAGNOSIS — M22.2X2 PATELLOFEMORAL PAIN SYNDROME OF LEFT KNEE: ICD-10-CM

## 2018-05-30 PROCEDURE — 97112 NEUROMUSCULAR REEDUCATION: CPT

## 2018-05-30 PROCEDURE — 97140 MANUAL THERAPY 1/> REGIONS: CPT

## 2018-05-30 PROCEDURE — 97110 THERAPEUTIC EXERCISES: CPT

## 2018-05-30 NOTE — PROGRESS NOTES
Daily Note     Today's date: 2018  Patient name: Yamilex Ambrosio  : 1970  MRN: [id number]  Referring provider: Yasmin Garza MD  Dx:   Encounter Diagnosis     ICD-10-CM    1  Status post Achilles tendon repair Z98 890    2  Patellofemoral pain syndrome of left knee M22 2X2    3  Patellofemoral disorder of left knee M22 2X2    4  Injury of left Achilles tendon, subsequent encounter S86 002D                   Subjective: Refer to RE      Objective: See treatment diary below  Manual      PROM 10' 10 min  10 min  10' 10' 10 10  10 min                                                             Exercise Diary   5/   HEP 10'            Ankle Alphabet  2x 2x 2x 2x 2x       Ankle AROM 4 way  2x10 2x10 2x10 2x10        Toe Curls  3 min  3 min  3 min  3' 3' 3 min 3' 3' 3'   Toe Lifts  2x10 2 x 10  2 x 10 2x10 2x10 2x10 2x10 2x10 2x10   Quad Sets bolster under ankle  :05x20 np- knee pain          SLR x 4  X 20  x20- flex/abd today x20 20x 1 5# 20x 1 5# 1 5#x20 1 5 # x20 2# x 20  2# x 20    Ankle Tband 3 way   YTB x 10 ea YTB x 20 YTB 20x YTB 20x np  NP    Seated Wobble Ap, ML  2x10 2x10 x20 20x 20x 20x 20x 20x 20x   Seated Wobble Cw, Ccw  2x10 2x10 x20 20x 20x 20x 20x 20x 20x   Standing hip 3 way - R only (NWB)    1x10 1 5# 20x 20x 2# x20  #2 x20 2# x 20 2# x 20    Weight shifting - 25%       :10x20 :05x20 :05x20 :05x20   Walking laps -25% WB       3 laps  3 laps 3 laps                                                                                                  Modalities  /16            CP 10                                         Assessment: Tolerated treatment well  Patient exhibited good technique with therapeutic exercises      Plan: Continue per plan of care

## 2018-06-01 ENCOUNTER — OFFICE VISIT (OUTPATIENT)
Dept: PHYSICAL THERAPY | Facility: CLINIC | Age: 48
End: 2018-06-01
Payer: COMMERCIAL

## 2018-06-01 DIAGNOSIS — M22.2X2 PATELLOFEMORAL PAIN SYNDROME OF LEFT KNEE: ICD-10-CM

## 2018-06-01 DIAGNOSIS — M22.2X2 PATELLOFEMORAL DISORDER OF LEFT KNEE: ICD-10-CM

## 2018-06-01 DIAGNOSIS — S86.002D INJURY OF LEFT ACHILLES TENDON, SUBSEQUENT ENCOUNTER: ICD-10-CM

## 2018-06-01 DIAGNOSIS — Z98.890 STATUS POST ACHILLES TENDON REPAIR: Primary | ICD-10-CM

## 2018-06-01 PROCEDURE — 97140 MANUAL THERAPY 1/> REGIONS: CPT | Performed by: PHYSICAL THERAPIST

## 2018-06-01 PROCEDURE — 97112 NEUROMUSCULAR REEDUCATION: CPT | Performed by: PHYSICAL THERAPIST

## 2018-06-01 PROCEDURE — 97116 GAIT TRAINING THERAPY: CPT | Performed by: PHYSICAL THERAPIST

## 2018-06-01 NOTE — PROGRESS NOTES
Daily Note     Today's date: 2018  Patient name: Tom Heck  : 1970  MRN: [id number]  Referring provider: Mai Goodell, MD  Dx:   Encounter Diagnosis     ICD-10-CM    1  Status post Achilles tendon repair Z98 890    2  Patellofemoral pain syndrome of left knee M22 2X2    3  Patellofemoral disorder of left knee M22 2X2    4  Injury of left Achilles tendon, subsequent encounter S86 002D                   Subjective: Pt compliant with HEP/WB restrictions      Objective: See treatment diary below  Manual     PROM 10' 10 min  10 min  10' 10' 10 10  10 min  10'                                                           Exercise Diary     HEP             Ankle Alphabet  2x 2x 2x 2x 2x       Ankle AROM 4 way  2x10 2x10 2x10 2x10        Toe Curls  3 min  3 min  3 min  3' 3' 3 min 3' 3' 3'   Toe Lifts 20x 2x10 2 x 10  2 x 10 2x10 2x10 2x10 2x10 2x10 2x10   Quad Sets bolster under ankle  :05x20 np- knee pain          SLR x 4 3# 20x X 20  x20- flex/abd today x20 20x 1 5# 20x 1 5# 1 5#x20 1 5 # x20 2# x 20  2# x 20    Ankle Tband 3 way YTB 20x  YTB x 10 ea YTB x 20 YTB 20x YTB 20x np  NP    Seated Wobble Ap, ML 20x ea 2x10 2x10 x20 20x 20x 20x 20x 20x 20x   Seated Wobble Cw, Ccw 20x ea 2x10 2x10 x20 20x 20x 20x 20x 20x 20x   Standing hip 3 way - R only (NWB) 4# 20x   1x10 1 5# 20x 20x 2# x20  #2 x20 2# x 20 2# x 20    Weight shifting - 25% 20x      :10x20 :05x20 :05x20 :05x20   Walking laps -25% WB       3 laps  3 laps 3 laps                                                                                                  Modalities             CP 10  10'                                     Direct Supervision: 9:30-10:00  Assessment: Tolerated treatment well  Patient exhibited good technique with therapeutic exercises      Plan: Continue per plan of care

## 2018-06-04 ENCOUNTER — OFFICE VISIT (OUTPATIENT)
Dept: PHYSICAL THERAPY | Facility: CLINIC | Age: 48
End: 2018-06-04
Payer: COMMERCIAL

## 2018-06-04 DIAGNOSIS — S86.002D INJURY OF LEFT ACHILLES TENDON, SUBSEQUENT ENCOUNTER: ICD-10-CM

## 2018-06-04 DIAGNOSIS — M22.2X2 PATELLOFEMORAL PAIN SYNDROME OF LEFT KNEE: Primary | ICD-10-CM

## 2018-06-04 DIAGNOSIS — M22.2X2 PATELLOFEMORAL DISORDER OF LEFT KNEE: ICD-10-CM

## 2018-06-04 DIAGNOSIS — Z98.890 STATUS POST ACHILLES TENDON REPAIR: ICD-10-CM

## 2018-06-04 PROCEDURE — 97140 MANUAL THERAPY 1/> REGIONS: CPT

## 2018-06-04 PROCEDURE — 97116 GAIT TRAINING THERAPY: CPT

## 2018-06-04 PROCEDURE — 97112 NEUROMUSCULAR REEDUCATION: CPT

## 2018-06-04 NOTE — PROGRESS NOTES
Daily Note     Today's date: 2018  Patient name: Addison Shipley  : 1970  MRN: [id number]  Referring provider: Gissel Keita MD  Dx:   Encounter Diagnosis     ICD-10-CM    1  Patellofemoral pain syndrome of left knee M22 2X2    2  Patellofemoral disorder of left knee M22 2X2    3  Injury of left Achilles tendon, subsequent encounter S86 002D    4  Status post Achilles tendon repair [id number]                   Subjective: Patient states that he has been maximally compliant with HEP  Will see MD this week  Objective: See treatment diary below  Manual     PROM 10' 10 min  10 min  10'  10 10  10 min  10'                                                           Exercise Diary     HEP             Ankle Alphabet  2x 2x 2x 2x 2x       Ankle AROM 4 way  2x10 2x10 2x10 2x10        Toe Curls  3 min  3 min  3 min  3' 3' 3 min 3' 3' 3'   Toe Lifts 20x 2x10 2 x 10  2 x 10 2x10 2x10 2x10 2x10 2x10 2x10   Quad Sets bolster under ankle  :05x20 np- knee pain          SLR x 4 3# 20x 3# X 20  x20- flex/abd today x20 20x 1 5# 20x 1 5# 1 5#x20 1 5 # x20 2# x 20  2# x 20    Ankle Tband 3 way YTB 20x  YTB x 10 ea YTB x 20 YTB 20x YTB 20x np  NP    Seated Wobble Ap, ML 20x ea 2x10 2x10 x20 20x 20x 20x 20x 20x 20x   Seated Wobble Cw, Ccw 20x ea 2x10 2x10 x20 20x 20x 20x 20x 20x 20x   Standing hip 3 way - R only (NWB) 4# 20x 4# x 20   1x10 1 5# 20x 20x 2# x20  #2 x20 2# x 20 2# x 20    Weight shifting - 25% 20x 20x     :10x20 :05x20 :05x20 :05x20   Walking laps -25% WB  3 laps     3 laps  3 laps 3 laps                                                                                                  Modalities  5/16 6/1 6          CP 10  10' 10                                        Assessment: Tolerated treatment well  Patient exhibited good technique with therapeutic exercises      Plan: Continue per plan of care

## 2018-06-06 ENCOUNTER — OFFICE VISIT (OUTPATIENT)
Dept: PHYSICAL THERAPY | Facility: CLINIC | Age: 48
End: 2018-06-06
Payer: COMMERCIAL

## 2018-06-06 DIAGNOSIS — Z98.890 STATUS POST ACHILLES TENDON REPAIR: ICD-10-CM

## 2018-06-06 DIAGNOSIS — S86.002D INJURY OF LEFT ACHILLES TENDON, SUBSEQUENT ENCOUNTER: ICD-10-CM

## 2018-06-06 DIAGNOSIS — M22.2X2 PATELLOFEMORAL DISORDER OF LEFT KNEE: Primary | ICD-10-CM

## 2018-06-06 DIAGNOSIS — M22.2X2 PATELLOFEMORAL PAIN SYNDROME OF LEFT KNEE: ICD-10-CM

## 2018-06-06 PROCEDURE — 97112 NEUROMUSCULAR REEDUCATION: CPT

## 2018-06-06 PROCEDURE — 97116 GAIT TRAINING THERAPY: CPT

## 2018-06-06 PROCEDURE — 97140 MANUAL THERAPY 1/> REGIONS: CPT

## 2018-06-06 PROCEDURE — 97110 THERAPEUTIC EXERCISES: CPT

## 2018-06-06 NOTE — PROGRESS NOTES
Daily Note     Today's date: 2018  Patient name: Astrid Gonzalez  : 1970  MRN: [id number]  Referring provider: Thompson Hagan MD  Dx:   Encounter Diagnosis     ICD-10-CM    1  Patellofemoral disorder of left knee M22 2X2    2  Injury of left Achilles tendon, subsequent encounter S86 002D    3  Status post Achilles tendon repair Z98 890    4  Patellofemoral pain syndrome of left knee M22 2X2                   Subjective: Patient denies a change in status  Will have a f/u appointment with MD tomorrow  Objective: See treatment diary below  Manual     PROM 10' 10 min  10 min  10'  10 10  10 min  10'                                                           Exercise Diary   5   HEP             Ankle Alphabet  2x 2x 2x 2x 2x       Ankle AROM 4 way  2x10  2x10 2x10        Toe Curls  3 min  3 min  3 min  3' 3' 3 min 3' 3' 3'   Toe Lifts 20x 2x10  2 x 10 2x10 2x10 2x10 2x10 2x10 2x10   Quad Sets bolster under ankle  :05x20           SLR x 4 3# 20x 3# X 20  4# x30  x20 20x 1 5# 20x 1 5# 1 5#x20 1 5 # x20 2# x 20  2# x 20    Ankle Tband 3 way YTB 20x  YTB x 20  YTB x 20 YTB 20x YTB 20x np  NP    Seated Wobble Ap, ML 20x ea 2x10 2x10 x20 20x 20x 20x 20x 20x 20x   Seated Wobble Cw, Ccw 20x ea 2x10 2x10 x20 20x 20x 20x 20x 20x 20x   Standing hip 3 way - R only (NWB) 4# 20x 4# x 20  4# x 2 0 1x10 1 5# 20x 20x 2# x20  #2 x20 2# x 20 2# x 20    Weight shifting - 25% 20x 20x 20x    :10x20 :05x20 :05x20 :05x20   Walking laps -25% WB  3 laps 3 laps    3 laps  3 laps 3 laps                                                                                                  Modalities   6/ 6/4 6/         CP 10  10' 10 10                                        Assessment: Tolerated treatment well  Patient exhibited good technique with therapeutic exercises      Plan: Continue per plan of care

## 2018-06-07 ENCOUNTER — OFFICE VISIT (OUTPATIENT)
Dept: OBGYN CLINIC | Facility: HOSPITAL | Age: 48
End: 2018-06-07

## 2018-06-07 VITALS
HEIGHT: 67 IN | BODY MASS INDEX: 28.88 KG/M2 | WEIGHT: 184 LBS | HEART RATE: 83 BPM | DIASTOLIC BLOOD PRESSURE: 90 MMHG | SYSTOLIC BLOOD PRESSURE: 158 MMHG

## 2018-06-07 DIAGNOSIS — Z48.89 AFTERCARE FOLLOWING SURGERY: Primary | ICD-10-CM

## 2018-06-07 PROCEDURE — 99024 POSTOP FOLLOW-UP VISIT: CPT | Performed by: ORTHOPAEDIC SURGERY

## 2018-06-07 NOTE — PROGRESS NOTES
53 y o male presents to the office roughly 2 months status post left achilles tendon repair on 04/02/2018  He is doing well with physical therapy  He has been compliant with his cam walker boot and 25% weightbearing  He has no new concerns or complaints today in the office  Review of Systems  Review of systems negative unless otherwise specified in HPI    Past Medical History  Past Medical History:   Diagnosis Date    Chronic back pain     Fibromyalgia     Hypertension     RA (rheumatoid arthritis) ([address] )        Past Surgical History  Past Surgical History:   Procedure Laterality Date    CARPAL TUNNEL RELEASE Left     LATERAL EPICONDYLE RELEASE Right 2/25/2016    Procedure: RELEASE EPICONDYLAR ELBOW, LATERAL;  Surgeon: Johanny Gaston MD;  Location: [facility];  Service:     HI REPAIR ACHILLES TENDON,PRIMARY Left 4/2/2018    Procedure: REPAIR TENDON ACHILLES;  Surgeon: Melody Jimenez MD;  Location: [facility];  Service: Orthopedics    ROTATOR CUFF REPAIR Left     ULNAR TUNNEL RELEASE Left     x2       Current Medications  Current Outpatient Prescriptions on File Prior to Visit   Medication Sig Dispense Refill    amLODIPine (NORVASC) 10 mg tablet Take 10 mg by mouth every evening        apixaban (ELIQUIS) 5 mg Take 2 tablets (10 mg total) by mouth 2 (two) times a day for 7 days 13 tablet 0    Butalbital-APAP-Caffeine (FIORICET PO) Take 1 tablet by mouth daily as needed Unknown dose        Losartan Potassium (COZAAR PO) Take 100 mg by mouth daily        methocarbamol (ROBAXIN) 750 mg tablet Take 750 mg by mouth 4 (four) times a day      metoprolol succinate (TOPROL-XL) 50 mg 24 hr tablet "I take one a day bedtime"       No current facility-administered medications on file prior to visit          Recent Labs [facility])    0  Lab Value Date/Time   HCT 48 0 02/09/2018 2239   HCT 47 8 10/27/2015 1646   HGB 17 1 (H) 02/09/2018 2239   HGB 17 0 10/27/2015 1646   WBC 5 33 02/09/2018 2239   WBC 10 38 (H) 10/27/2015 1646   INR 1 04 04/02/2018 0745   INR 0 99 10/27/2015 1646   GLUCOSE 98 02/09/2018 2239   GLUCOSE 91 10/27/2015 1646         Physical exam  · General: Awake, Alert, Oriented  · Eyes: Pupils equal, round and reactive to light  · Heart: regular rate and rhythm  · Lungs: No audible wheezing  · Abdomen: soft  left ankle  · Patient ambulates with assistance of a cam walker boot and crutches  · Good capillary refill  · Good passive ROM      Imaging  None    Procedure  None    Assessment/Plan:   48 y o male is 2 months status post left achilles tendon repair  He will progress weightbearing to 50% for 1 week, 100% without crutches for 1 week, then discontinue cam walker boot  He will follow up with us in 4 weeks

## 2018-06-11 ENCOUNTER — OFFICE VISIT (OUTPATIENT)
Dept: PHYSICAL THERAPY | Facility: CLINIC | Age: 48
End: 2018-06-11
Payer: COMMERCIAL

## 2018-06-11 DIAGNOSIS — S86.002D INJURY OF LEFT ACHILLES TENDON, SUBSEQUENT ENCOUNTER: ICD-10-CM

## 2018-06-11 DIAGNOSIS — M22.2X2 PATELLOFEMORAL DISORDER OF LEFT KNEE: Primary | ICD-10-CM

## 2018-06-11 DIAGNOSIS — M22.2X2 PATELLOFEMORAL PAIN SYNDROME OF LEFT KNEE: ICD-10-CM

## 2018-06-11 DIAGNOSIS — Z98.890 STATUS POST ACHILLES TENDON REPAIR: ICD-10-CM

## 2018-06-11 PROCEDURE — 97140 MANUAL THERAPY 1/> REGIONS: CPT

## 2018-06-11 PROCEDURE — 97110 THERAPEUTIC EXERCISES: CPT

## 2018-06-11 PROCEDURE — 97112 NEUROMUSCULAR REEDUCATION: CPT

## 2018-06-11 NOTE — PROGRESS NOTES
Daily Note     Today's date: 2018  Patient name: Josue Pulido  : 1970  MRN: [id number]  Referring provider: Stephanie Tejada MD  Dx:   Encounter Diagnosis     ICD-10-CM    1  Patellofemoral disorder of left knee M22 2X2    2  Injury of left Achilles tendon, subsequent encounter S86 002D    3  Status post Achilles tendon repair Z98 890    4  Patellofemoral pain syndrome of left knee M22 2X2                   Subjective: Patient reports doctor said he is to be 50% WB this week and then 100% next week  Objective: See treatment diary below      Assessment: Tolerated treatment well  Patient exhibited good technique with therapeutic exercises  Progressed to 50% WB this visit  No difficulty in 50% WB  Good understanding of WB  Plan: Continue per plan of care  100% WB NW per doctor with CAM walker for one week and then after 1 week d/c CAM walker      Manual   5   PROM 10' 10 min  10 min  10'   10 10   10 min  10'                                                                                                         Exercise Diary   5 5   HEP                       Ankle Alphabet   2x 2x 2x 2x 2x           Ankle AROM 4 way   2x10   2x10 2x10             Toe Curls   3 min  3 min  3 min  3' 3' 3 min 3' 3' 3'   Toe Lifts 20x 2x10   2 x 10 2x10 2x10 2x10 2x10 2x10 2x10   Quad Sets bolster under ankle   :05x20                   SLR x 4 3# 20x 3# X 20  4# x30  4# x30 20x 1 5# 20x 1 5# 1 5#x20 1 5 # x20 2# x 20  2# x 20    Ankle Tband 3 way YTB 20x   YTB x 20  YTB x 20 YTB 20x YTB 20x np   NP     Seated Wobble Ap, ML 20x ea 2x10 2x10 x20 20x 20x 20x 20x 20x 20x   Seated Wobble Cw, Ccw 20x ea 2x10 2x10 x20 20x 20x 20x 20x 20x 20x   Standing hip 3 way - R only (NWB) 4# 20x 4# x 20  4# x 2 0 4# x20 1 5# 20x 20x 2# x20  #2 x20 2# x 20 2# x 20    Weight shifting - 50% 20x 20x 20x  30-40x     :10x20 :77A21 :05x20 :05x20   Walking laps -25% WB   3 laps 3 laps  3 laps     3 laps   3 laps 3 laps                                                                                                                                                                                 Modalities  5/16 6/1 6/4 6/6  6/11             CP 10  10' 10 10   10'

## 2018-06-13 ENCOUNTER — OFFICE VISIT (OUTPATIENT)
Dept: PHYSICAL THERAPY | Facility: CLINIC | Age: 48
End: 2018-06-13
Payer: COMMERCIAL

## 2018-06-13 DIAGNOSIS — M22.2X2 PATELLOFEMORAL PAIN SYNDROME OF LEFT KNEE: ICD-10-CM

## 2018-06-13 DIAGNOSIS — Z98.890 STATUS POST ACHILLES TENDON REPAIR: ICD-10-CM

## 2018-06-13 DIAGNOSIS — S86.002D INJURY OF LEFT ACHILLES TENDON, SUBSEQUENT ENCOUNTER: ICD-10-CM

## 2018-06-13 DIAGNOSIS — M22.2X2 PATELLOFEMORAL DISORDER OF LEFT KNEE: Primary | ICD-10-CM

## 2018-06-13 PROCEDURE — 97112 NEUROMUSCULAR REEDUCATION: CPT

## 2018-06-13 PROCEDURE — 97140 MANUAL THERAPY 1/> REGIONS: CPT

## 2018-06-13 NOTE — PROGRESS NOTES
Daily Note     Today's date: 2018  Patient name: Da Freedman  : 1970  MRN: [id number]  Referring provider: Bharat España MD  Dx:   Encounter Diagnosis     ICD-10-CM    1  Patellofemoral disorder of left knee M22 2X2    2  Injury of left Achilles tendon, subsequent encounter S86 002D    3  Status post Achilles tendon repair Z98 890    4  Patellofemoral pain syndrome of left knee M22 2X2                   Subjective: Patient states that he saw MD last week and is allowed to change begin FWB in CAM boot starting tomorrow for 1 week         Objective: See treatment diary below  Patient requested to start early and leave early today and was accommodated  Manual     PROM 10' 10 min  10 min  10'   10 10   10 min  10'                                                                                                         Exercise Diary     HEP                       Ankle Alphabet   2x 2x 2x 2x 2x           Ankle AROM 4 way   2x10   2x10 2x10             Toe Curls   3 min  3 min  3 min  3' 3' 3 min 3' 3' 3'   Toe Lifts 20x 2x10   2 x 10 2x10 2x10 2x10 2x10 2x10 2x10   Quad Sets bolster under ankle   :05x20                   SLR x 4 3# 20x 3# X 20  4# x30  4# x30 20x 4# 20x 1 5# 1 5#x20 1 5 # x20 2# x 20  2# x 20    Ankle Tband 3 way YTB 20x   YTB x 20  YTB x 20 YTB 20x YTB 20x np   NP     Seated Wobble Ap, ML 20x ea 2x10 2x10 x20 20x 20x 20x 20x 20x 20x   Seated Wobble Cw, Ccw 20x ea 2x10 2x10 x20 20x 20x 20x 20x 20x 20x   Standing hip 3 way - R only (NWB) 4# 20x 4# x 20  4# x 2 0 4# x20 4# 20x 20x 2# x20  #2 x20 2# x 20 2# x 20    Weight shifting - 50% 20x 20x 20x  30-40x  30 - 40 x   :10x20 :05x20 :05x20 :05x20   Walking laps -25% WB   3 laps 3 laps  3 laps  3 laps   3 laps   3 laps 3 laps                                                                                                                                                                                 Modalities  5/16 6/1 6/4 6/6 6/11 6/13           CP 10  10' 10 10   10'  5 '                                                             Assessment: Tolerated treatment well  Patient exhibited good technique with therapeutic exercises      Plan: Continue per plan of care

## 2018-06-18 ENCOUNTER — EVALUATION (OUTPATIENT)
Dept: PHYSICAL THERAPY | Facility: CLINIC | Age: 48
End: 2018-06-18
Payer: COMMERCIAL

## 2018-06-18 DIAGNOSIS — M22.2X2 PATELLOFEMORAL DISORDER OF LEFT KNEE: Primary | ICD-10-CM

## 2018-06-18 DIAGNOSIS — S86.002D INJURY OF LEFT ACHILLES TENDON, SUBSEQUENT ENCOUNTER: ICD-10-CM

## 2018-06-18 DIAGNOSIS — M22.2X2 PATELLOFEMORAL PAIN SYNDROME OF LEFT KNEE: ICD-10-CM

## 2018-06-18 DIAGNOSIS — Z98.890 STATUS POST ACHILLES TENDON REPAIR: ICD-10-CM

## 2018-06-18 PROCEDURE — 97112 NEUROMUSCULAR REEDUCATION: CPT | Performed by: PHYSICAL THERAPIST

## 2018-06-18 PROCEDURE — 97140 MANUAL THERAPY 1/> REGIONS: CPT | Performed by: PHYSICAL THERAPIST

## 2018-06-18 PROCEDURE — G8990 OTHER PT/OT CURRENT STATUS: HCPCS | Performed by: PHYSICAL THERAPIST

## 2018-06-18 PROCEDURE — G8991 OTHER PT/OT GOAL STATUS: HCPCS | Performed by: PHYSICAL THERAPIST

## 2018-06-18 PROCEDURE — 97116 GAIT TRAINING THERAPY: CPT | Performed by: PHYSICAL THERAPIST

## 2018-06-18 NOTE — PROGRESS NOTES
PT Re-Evaluation    Today's date: 2018  Patient name: Yamilex Ambrosio  : 1970  MRN: [id number]  Referring provider: Yasmin Garza MD  Dx:   Encounter Diagnosis     ICD-10-CM    1  Patellofemoral disorder of left knee M22 2X2    2  Injury of left Achilles tendon, subsequent encounter S86 002D    3  Status post Achilles tendon repair Z98 [id number]    4  Patellofemoral pain syndrome of left knee M22 2X2                   Assessment  Impairments: abnormal gait, abnormal or restricted ROM, impaired balance, impaired physical strength, pain with function and weight-bearing intolerance    Assessment details: Patient has been compliant with attending PT and home exercise program since initial eval   He  has made progress towards his goals and improvements in objective data since initial eval but is still limited compared to prior level of function  He continues with to have above listed impairments and would benefit from additional skilled PT to address these deficits to return to maximal level of function  He is just beginning FWB and will be weaning out of his CAM boot this week  He will require PT to normalize gait, strength and functional mobility  Thank you for this pleasant referral       Understanding of Dx/Px/POC: good   Prognosis: good    Goals  ST-6 weeks  1  Patient to be independent with HEP  -Met  2  Decrease pain at least 2 subjective levels  Met     LT-12 weeks  1    Patient to voice comfort with self management of condition -Met  2   75% or > decreased pain -Partially Met  3   75% or > decreased functional deficits -Partially Met  4  Normalize AROM of all deficit planes  -Met  5  Normalize strength -Not Met  6  Functional Status Score: 55 -Not Met  7  Patient to voice understanding of activities/positions to avoid  - Met  8    Normalize Gait- Not Met    Plan  Patient would benefit from: skilled PT  Referral necessary: No  Planned modality interventions: cryotherapy and TENS  Planned therapy interventions: IADL retraining, joint mobilization, manual therapy, motor coordination training, neuromuscular re-education, patient education, self care, strengthening, stretching, therapeutic activities, therapeutic exercise, home exercise program, flexibility, ADL training, balance, body mechanics training, balance/weight bearing training and gait training  Frequency: 2x week  Duration in weeks: 8  Treatment plan discussed with: patient        Subjective Evaluation    History of Present Illness  Date of onset: 3/29/2018  Date of surgery: 2018  Mechanism of injury: Chief Complaint: L achilles pain, limited mobility, L knee pain     History: Pt has remained compliant with restrictions and HEP/Therapy  He has minimal ankle/achilles symptoms but does note L knee has been hurting him more with increased WB noting he feels the angle the CAM boot puts his foot in makes this worse        PMH: L knee pain, DVT, blood thinners x 3 mo, carpal tunnel surgeries, RTC surgeries     Aggravating factors: WB, walking     Functional Deficits: Walking, Going to the gym, standing prolonged, sleeping     Patient Goals: Get back to standing, normal walking    Quality of life: good    Pain  At best pain ratin  At worst pain ratin  Location: L Knee      Diagnostic Tests  MRI studies: abnormal        Objective     Active Range of Motion   Left Knee   Flexion: WFL  Extension: 5 degrees     Right Ankle/Foot   Dorsiflexion (ke): 15 degrees   Dorsiflexion (kf): 15 degrees   Plantar flexion: 50 degrees   Inversion: 40 degrees   Eversion: 20 degrees   Great toe extension: 70 degrees     Passive Range of Motion   Left Knee   Flexion: WFL  Extension: 0 degrees WFL    Right Ankle/Foot    Dorsiflexion (ke): WFL  Dorsiflexion (kf): WFL   Plantar flexion: WFL  Inversion: WFL  Eversion: WFL  Great toe extension: WFL    Strength/Myotome Testing     Left Knee   Flexion: 3+  Extension: 3+    Right Ankle/Foot   Dorsiflexion: 4  Plantar flexion: 3  Inversion: 4  Eversion: 4  Great toe extension: 4    Tests     Additional Tests Details  (+) TTP Medial Joint Line, M/L Patellar Grooves  SLR WFL  (+) Patellar Compression  No pain with DL Squat  Incision closed, healing welll  Wears CAM boot; 100% WB  Min swelling t/o L ankle  SLS- NT  DL Squat: 85 with UE support  Difficulty with HR              Precautions: 25% WB, Recovering from DVT, On Blood Thinners    Daily Treatment Diary   Daily Note     Today's date: 2018  Patient name: Fredis Schultz  : 1970  MRN: [id number]  Referring provider: Humberto Barker MD  Dx:   Encounter Diagnosis     ICD-10-CM    1  Patellofemoral disorder of left knee M22 2X2    2  Injury of left Achilles tendon, subsequent encounter S86 002D    3  Status post Achilles tendon repair [id number] [id number]    4  Patellofemoral pain syndrome of left knee M22 2X2                   Subjective: Patient states that he saw MD last week and is allowed to change begin FWB in CAM boot starting tomorrow for 1 week         Objective: See treatment diary below  Patient requested to start early and leave early today and was accommodated  Manual     PROM 10' 10 min  10 min  10'  10'' 10 10   10 min  10'                                                                                                         Exercise Diary  16    HEP                       Ankle Alphabet   2x 2x 2x 2x 2x           Ankle AROM 4 way   2x10   2x10 2x10             Toe Curls   3 min  3 min  3 min  3'  3 min 3' 3' 3'   Toe Lifts 20x 2x10   2 x 10 2x10  2x10 2x10 2x10 2x10   Quad Sets bolster under ankle   :05x20                   SLR x 4 3# 20x 3# X 20  4# x30  4# x30 20x 4# 20x 1 5# 1 5#x20 1 5 # x20 2# x 20  2# x 20    Ankle Tband 3 way YTB 20x   YTB x 20  YTB x 20 YTB 20x Bllue TB 20x np   NP     Seated Wobble Ap, ML 20x ea 2x10 2x10 x20 20x 20x Stand FWB 20x 20x 20x 20x   Seated Wobble Cw, Ccw 20x ea 2x10 2x10 x20 20x 20x Stand FWB 20x 20x 20x 20x   Standing hip 3 way - R only (NWB) 4# 20x 4# x 20  4# x 2 0 4# x20 4# 20x 20x 4# 2# x20  #2 x20 2# x 20 2# x 20    Weight shifting - 50% 20x 20x 20x  30-40x  30 - 40 x  100% Cam and shoe :10x20 :05x20 :05x20 :05x20   Walking laps -25% WB   3 laps 3 laps  3 laps  3 laps  2 laps FWB 3 laps   3 laps 3 laps                                                                                                                                                                                 Modalities  5/16 6/1 6/4 6/6  6/11 6/13           CP 10  10' 10 10   10'  5 '                                                           Direct Supervision:  [id number]     Assessment: Tolerated treatment well  Patient exhibited good technique with therapeutic exercises      Plan: Continue per plan of care

## 2018-06-20 ENCOUNTER — OFFICE VISIT (OUTPATIENT)
Dept: PHYSICAL THERAPY | Facility: CLINIC | Age: 48
End: 2018-06-20
Payer: COMMERCIAL

## 2018-06-20 DIAGNOSIS — M22.2X2 PATELLOFEMORAL DISORDER OF LEFT KNEE: Primary | ICD-10-CM

## 2018-06-20 DIAGNOSIS — M22.2X2 PATELLOFEMORAL PAIN SYNDROME OF LEFT KNEE: ICD-10-CM

## 2018-06-20 DIAGNOSIS — S86.002D INJURY OF LEFT ACHILLES TENDON, SUBSEQUENT ENCOUNTER: ICD-10-CM

## 2018-06-20 DIAGNOSIS — Z98.890 STATUS POST ACHILLES TENDON REPAIR: ICD-10-CM

## 2018-06-20 PROCEDURE — 97140 MANUAL THERAPY 1/> REGIONS: CPT

## 2018-06-20 PROCEDURE — 97116 GAIT TRAINING THERAPY: CPT

## 2018-06-20 PROCEDURE — 97112 NEUROMUSCULAR REEDUCATION: CPT

## 2018-06-20 NOTE — PROGRESS NOTES
Daily Note     Today's date: 2018  Patient name: Astrid Gonzalez  : 1970  MRN: [id number]  Referring provider: Thompson Hagan MD  Dx:   Encounter Diagnosis     ICD-10-CM    1  Patellofemoral disorder of left knee M22 2X2    2  Injury of left Achilles tendon, subsequent encounter S86 002D    3  Status post Achilles tendon repair [id number] [id number]    4  Patellofemoral pain syndrome of left knee M22 2X2                   Subjective: Patient states that he has been walking around his home with sneaker and has been maximally compliant with HEP         Objective: See treatment diary below  Manual     PROM 10' 10 min  10 min  10'  10'' 10 10   10 min  10'                                                                                                         Exercise Diary     HEP                       Ankle Alphabet   2x 2x 2x 2x 2x           Ankle AROM 4 way   2x10   2x10 2x10             Toe Curls   3 min  3 min  3 min  3'  3 min 3' 3' 3'   Toe Lifts 20x 2x10   2 x 10 2x10  2x10 2x10 2x10 2x10   Quad Sets bolster under ankle   :05x20                   SLR x 4 3# 20x 3# X 20  4# x30  4# x30 20x 4# 20x 1 5# 1 5#x20 1 5 # x20 2# x 20  2# x 20    Ankle Tband 3 way YTB 20x   YTB x 20  YTB x 20 YTB 20x Bllue TB 20x np   NP     Seated Wobble Ap, ML 20x ea 2x10 2x10 x20 20x 20x Stand FWB [id number] 20x 20x 20x   Seated Wobble Cw, Ccw 20x ea 2x10 2x10 x20 20x 20x Stand FWB stand 20x 20x 20x 20x   Standing hip 3 way - R only (NWB) 4# 20x 4# x 20  4# x 2 0 4# x20 4# 20x 20x 4# 4# x20  #2 x20 2# x 20 2# x 20    Weight shifting - 50% 20x 20x 20x  30-40x  30 - 40 x  100% Cam and shoe SLS :10  x10  :05x20 :05x20 :05x20   Walking laps -25% WB   3 laps 3 laps  3 laps  3 laps  2 laps FWB 3 laps   3 laps 3 laps    Standing HR/TR              2x10           Leg press HR             60# x 20           Squats             2x10           Treadmill walking             6 min                                                                                        Modalities  5/16 6/1 6/4 6/6 6/11 6/13 6/20         CP 10  10' 10 10   10'  5 ' declined                                                             Assessment: Tolerated treatment well  Patient exhibited good technique with therapeutic exercises      Plan: Continue per plan of care

## 2018-06-25 ENCOUNTER — OFFICE VISIT (OUTPATIENT)
Dept: PHYSICAL THERAPY | Facility: CLINIC | Age: 48
End: 2018-06-25
Payer: COMMERCIAL

## 2018-06-25 DIAGNOSIS — Z98.890 STATUS POST ACHILLES TENDON REPAIR: ICD-10-CM

## 2018-06-25 DIAGNOSIS — M22.2X2 PATELLOFEMORAL DISORDER OF LEFT KNEE: ICD-10-CM

## 2018-06-25 DIAGNOSIS — S86.002D INJURY OF LEFT ACHILLES TENDON, SUBSEQUENT ENCOUNTER: Primary | ICD-10-CM

## 2018-06-25 DIAGNOSIS — M22.2X2 PATELLOFEMORAL PAIN SYNDROME OF LEFT KNEE: ICD-10-CM

## 2018-06-25 PROCEDURE — 97116 GAIT TRAINING THERAPY: CPT | Performed by: PHYSICAL THERAPIST

## 2018-06-25 PROCEDURE — 97112 NEUROMUSCULAR REEDUCATION: CPT | Performed by: PHYSICAL THERAPIST

## 2018-06-25 PROCEDURE — 97140 MANUAL THERAPY 1/> REGIONS: CPT | Performed by: PHYSICAL THERAPIST

## 2018-06-25 NOTE — PROGRESS NOTES
Daily Note     Today's date: 2018  Patient name: Estela Reyes  : 1970  MRN: [id number]  Referring provider: Ebenezer Hernandez MD  Dx:   Encounter Diagnosis     ICD-10-CM    1  Injury of left Achilles tendon, subsequent encounter S86 002D    2  Patellofemoral disorder of left knee M22 2X2    3  Status post Achilles tendon repair [id number] [id number]    4  Patellofemoral pain syndrome of left knee M22 2X2                   Subjective: Pt notes some soreness with increased WB    Very compliant with HEP      Objective: See treatment diary below      Objective: See treatment diary below  Manual     PROM 10' 10 min  10 min  10'  10'' 10 5'   10 min  10'    Post talocrural MWM [address] Str             5                                                                                         Exercise Diary     HEP                       Ankle Alphabet   2x 2x 2x 2x 2x           Ankle AROM 4 way   2x10   2x10 2x10             Toe Curls   3 min  3 min  3 min  3'  3 min DC 3' 3'   Toe Lifts 20x 2x10   2 x 10 2x10  2x10  2x10 2x10   Quad Sets bolster under ankle   :05x20                   SLR x 4 3# 20x 3# X 20  4# x30  4# x30 20x 4# 20x 1 5# 1 5#x20  2# x 20  2# x 20    Ankle Tband 3 way YTB 20x   YTB x 20  YTB x 20 YTB 20x Bllue TB 20x np   NP     Seated Wobble Ap, ML 20x ea 2x10 2x10 x20 20x 20x Stand FWB [id number] 20x stand SL 20x 20x   Seated Wobble Cw, Ccw 20x ea 2x10 2x10 x20 20x 20x Stand FWB stand 20x 20x Stand 20x 20x   Standing hip 3 way - R only (NWB) 4# 20x 4# x 20  4# x 2 0 4# x20 4# 20x 20x 4# 4# x20   2# x 20 2# x 20    Weight shifting - 50% 20x 20x 20x  30-40x  30 - 40 x  100% Cam and shoe SLS :10  x10   :05x20 :05x20   Walking laps -25% WB   3 laps 3 laps  3 laps  3 laps  2 laps FWB 3 laps ' 3 laps 3 laps    Standing HR/TR              2x10  20x        Leg press HR             60# x 20   60# 20x        Squats             2x10   20x        Treadmill walking             6 min   5'        Step down               6'' 20x        TB Pulls 4 way with SLS              :10/10x        SLS               :10/10x             Modalities  5/16 6/1 6/4 6/6 6/11 6/13 6/20         CP 10  10' 10 10   10'  5 ' declined                                                           Direct Supervision: 11-11:30  Assessment: Tolerated treatment well  Patient would benefit from continued PT      Plan: Continue per plan of care

## 2018-06-27 ENCOUNTER — OFFICE VISIT (OUTPATIENT)
Dept: PHYSICAL THERAPY | Facility: CLINIC | Age: 48
End: 2018-06-27
Payer: COMMERCIAL

## 2018-06-27 DIAGNOSIS — Z98.890 STATUS POST ACHILLES TENDON REPAIR: ICD-10-CM

## 2018-06-27 DIAGNOSIS — S86.002D INJURY OF LEFT ACHILLES TENDON, SUBSEQUENT ENCOUNTER: Primary | ICD-10-CM

## 2018-06-27 DIAGNOSIS — M22.2X2 PATELLOFEMORAL DISORDER OF LEFT KNEE: ICD-10-CM

## 2018-06-27 PROCEDURE — 97116 GAIT TRAINING THERAPY: CPT

## 2018-06-27 PROCEDURE — 97140 MANUAL THERAPY 1/> REGIONS: CPT

## 2018-06-27 PROCEDURE — 97110 THERAPEUTIC EXERCISES: CPT

## 2018-06-27 PROCEDURE — 97112 NEUROMUSCULAR REEDUCATION: CPT

## 2018-06-27 NOTE — PROGRESS NOTES
Daily Note     Today's date: 2018  Patient name: Marcus Wiggins  : 1970  MRN: [id number]  Referring provider: Aakash Curtis MD  Dx:   Encounter Diagnosis     ICD-10-CM    1  Injury of left Achilles tendon, subsequent encounter S86 002D    2  Patellofemoral disorder of left knee M22 2X2    3  Status post Achilles tendon repair [id number]                   Subjective: Patient states that he has been active at the gym without significant pain     Objective: See treatment diary below  Manual     PROM 10' 10 min  10 min  10'  10'' 10 5'  5 10 min  10'    Post talocrural MWM [address] Str             5    5 -TS,DPT                                                                                     Exercise Diary     HEP                       Ankle Alphabet   2x 2x 2x 2x 2x           Ankle AROM 4 way   2x10   2x10 2x10             Toe Curls   3 min  3 min  3 min  3'  3 min DC DC 3'   Toe Lifts 20x 2x10   2 x 10 2x10  2x10   2x10   Quad Sets bolster under ankle   :05x20                   SLR x 4 3# 20x 3# X 20  4# x30  4# x30 20x 4# 20x 1 5# 1 5#x20  2# x 20  2# x 20    Ankle Tband 3 way YTB 20x   YTB x 20  YTB x 20 YTB 20x Bllue TB 20x np        Seated Wobble Ap, ML 20x ea 2x10 2x10 x20 20x 20x Stand FWB [id number] 20x stand SL 20x 20x   Seated Wobble Cw, Ccw 20x ea 2x10 2x10 x20 20x 20x Stand FWB stand 20x 20x Stand 20x 20x   Standing hip 3 way - R only (NWB) 4# 20x 4# x 20  4# x 2 0 4# x20 4# 20x 20x 4# 4# x20    2# x 20    Weight shifting - 50% 20x 20x 20x  30-40x  30 - 40 x  100% Cam and shoe SLS :10  x10   SLS :10x10  :05x20   Walking laps -25% WB   3 laps 3 laps  3 laps  3 laps  2 laps FWB 3 laps '  3 laps    Standing HR/TR              2x10  20x  20x      Leg press HR             60# x 20   60# 20x 60# x 20      Squats             2x10   20x  20      Treadmill walking             6 min   5'  7 min       Step down               6'' 20x  8" x 20       TB Pulls 4 way with SLS              :10/10x  GTB x 10      SLS cone taps         5 laps                  SLS               :10/10x            Modalities  5/16 6/1 6/4 6/6 6/11 6/13 6/20         CP 10  10' 10 10   10'  5 ' declined                                                             Assessment: Tolerated treatment well  Patient exhibited good technique with therapeutic exercises      Plan: Continue per plan of care

## 2018-07-02 ENCOUNTER — APPOINTMENT (OUTPATIENT)
Dept: PHYSICAL THERAPY | Facility: CLINIC | Age: 48
End: 2018-07-02
Payer: COMMERCIAL

## 2018-07-05 ENCOUNTER — OFFICE VISIT (OUTPATIENT)
Dept: PHYSICAL THERAPY | Facility: CLINIC | Age: 48
End: 2018-07-05
Payer: COMMERCIAL

## 2018-07-05 DIAGNOSIS — S86.002D INJURY OF LEFT ACHILLES TENDON, SUBSEQUENT ENCOUNTER: Primary | ICD-10-CM

## 2018-07-05 DIAGNOSIS — M22.2X2 PATELLOFEMORAL PAIN SYNDROME OF LEFT KNEE: ICD-10-CM

## 2018-07-05 DIAGNOSIS — M22.2X2 PATELLOFEMORAL DISORDER OF LEFT KNEE: ICD-10-CM

## 2018-07-05 DIAGNOSIS — Z98.890 STATUS POST ACHILLES TENDON REPAIR: ICD-10-CM

## 2018-07-05 PROCEDURE — 97110 THERAPEUTIC EXERCISES: CPT

## 2018-07-05 PROCEDURE — 97112 NEUROMUSCULAR REEDUCATION: CPT

## 2018-07-05 PROCEDURE — 97140 MANUAL THERAPY 1/> REGIONS: CPT

## 2018-07-05 NOTE — PROGRESS NOTES
Daily Note     Today's date: 2018  Patient name: Eze Zelaya  : 1970  MRN: [id number]  Referring provider: Neela Krishnamurthy MD  Dx:   Encounter Diagnosis     ICD-10-CM    1  Injury of left Achilles tendon, subsequent encounter S86 002D    2  Patellofemoral disorder of left knee M22 2X2    3  Status post Achilles tendon repair [id number]    4  Patellofemoral pain syndrome of left knee M22 2X2                   Subjective: Patient states that he has noticed an improvement with his walking tolerance over the last week         Objective: See treatment diary below  Manual     PROM 10' 10 min  10 min  10'  10'' 10 5'  5 10 min  10'    Post talocrural MWM [address] Str             5    5 -TS,DPT                                                                                     Exercise Diary   7   HEP                       Ankle Alphabet   2x 2x 2x 2x 2x           Ankle AROM 4 way   2x10   2x10 2x10             Toe Curls   3 min  3 min  3 min  3'  3 min DC DC DC   Toe Lifts 20x 2x10   2 x 10 2x10  2x10   DC   Quad Sets bolster under ankle   :05x20                   SLR x 4 3# 20x 3# X 20  4# x30  4# x30 20x 4# 20x 1 5# 1 5#x20  2# x 20  NP    Ankle Tband 3 way YTB 20x   YTB x 20  YTB x 20 YTB 20x Bllue TB 20x np        Seated Wobble Ap, ML 20x ea 2x10 2x10 x20 20x 20x Stand FWB [id number] 20x stand SL 20x Stand- sl 20 x   Seated Wobble Cw, Ccw 20x ea 2x10 2x10 x20 20x 20x Stand FWB stand 20x 20x Stand 20x Stand- sl 20 x   Standing hip 3 way - R only (NWB) 4# 20x 4# x 20  4# x 2 0 4# x20 4# 20x 20x 4# 4# x20    2# x 20    Weight shifting - 50% 20x 20x 20x  30-40x  30 - 40 x  100% Cam and shoe SLS :10  x10   SLS :10x10  :05x20   Walking laps -25% WB   3 laps 3 laps  3 laps  3 laps  2 laps FWB 3 laps '  3 laps    Standing HR/TR              2x10  20x  20x  20x - more sl    Leg press HR             60# x 20   60# 20x 60# x 20  70# x 20    Squats             2x10   20x  20  FOAM x 20    Treadmill walking             6 min   5'  7 min   5 min    Step down               6'' 20x  8" x 20       TB Pulls 4 way with SLS              :10/10x  GTB x 10      SLS cone taps         5 laps FOAM 5 laps    Total gym HR           2 x 10 lvl 8    Gastroc stretch          :30 x 5    SLS               :10/10x            Modalities  5/16 6/1 6/4 6/6  6/11 6/13 6/20         CP 10  10' 10 10   10'  5 ' declined                                                           Assessment: Tolerated treatment well  Patient exhibited good technique with therapeutic exercises      Plan: Continue per plan of care

## 2018-07-09 ENCOUNTER — OFFICE VISIT (OUTPATIENT)
Dept: PHYSICAL THERAPY | Facility: CLINIC | Age: 48
End: 2018-07-09
Payer: COMMERCIAL

## 2018-07-09 DIAGNOSIS — M22.2X2 PATELLOFEMORAL PAIN SYNDROME OF LEFT KNEE: ICD-10-CM

## 2018-07-09 DIAGNOSIS — Z98.890 STATUS POST ACHILLES TENDON REPAIR: ICD-10-CM

## 2018-07-09 DIAGNOSIS — M22.2X2 PATELLOFEMORAL DISORDER OF LEFT KNEE: ICD-10-CM

## 2018-07-09 DIAGNOSIS — S86.002D INJURY OF LEFT ACHILLES TENDON, SUBSEQUENT ENCOUNTER: Primary | ICD-10-CM

## 2018-07-09 PROCEDURE — 97112 NEUROMUSCULAR REEDUCATION: CPT | Performed by: PHYSICAL THERAPIST

## 2018-07-09 PROCEDURE — 97010 HOT OR COLD PACKS THERAPY: CPT | Performed by: PHYSICAL THERAPIST

## 2018-07-09 PROCEDURE — 97140 MANUAL THERAPY 1/> REGIONS: CPT | Performed by: PHYSICAL THERAPIST

## 2018-07-09 PROCEDURE — 97110 THERAPEUTIC EXERCISES: CPT | Performed by: PHYSICAL THERAPIST

## 2018-07-09 NOTE — PROGRESS NOTES
Daily Note     Today's date: 2018  Patient name: Steve Rice  : 1970  MRN: [id number]  Referring provider: Roscoe Torres MD  Dx:   Encounter Diagnosis     ICD-10-CM    1  Injury of left Achilles tendon, subsequent encounter S86 002D    2  Patellofemoral disorder of left knee M22 2X2    3  Status post Achilles tendon repair [id number]    4  Patellofemoral pain syndrome of left knee M22 2X2                   Subjective: Pt notes that he is doing well today  He remains stiff in his TCJ, but has been walking and performing activities overall  He states that he is in a bit of a time crunch today as he is having some issues at home  Objective: See treatment diary below      Assessment: Tolerated treatment well  Patient demonstrated fatigue post treatment, exhibited good technique with therapeutic exercises, would benefit from continued PT and was able to compelete all without increase in pain  He is challenged with SLS activities  Plan: Continue per plan of care  Progress treatment as tolerated          Manual     PROM 10' 10 min  10 min  10'  10'' 10 5'  5 7'  10'    Post talocrural MWM [address] Str             5    5 -TS,DPT  3'                                                                                   Exercise Diary              SLR NP 2#            Standing wobble board SL 20x ap/ml            Seated wobble board SL 20x cw/ccw            Standing hip 3 way 2# 20x            SLS 5"x20            Walking laps- 25% WB NP             Standing HR/TR 20x- more SL            Leg press HR  70# 20x            squats Foam 20x            TM 5'            TB pulls 4way w/ SLS NP            SLS cone taps 5 laps no foam            gastroc stretch 30"x5                                                                                                                                                    Modalities         CP 10  10' 10 10   10'  5 ' declined  10'

## 2018-07-11 ENCOUNTER — APPOINTMENT (OUTPATIENT)
Dept: PHYSICAL THERAPY | Facility: CLINIC | Age: 48
End: 2018-07-11
Payer: COMMERCIAL

## 2018-07-12 ENCOUNTER — OFFICE VISIT (OUTPATIENT)
Dept: OBGYN CLINIC | Facility: CLINIC | Age: 48
End: 2018-07-12
Payer: COMMERCIAL

## 2018-07-12 VITALS
BODY MASS INDEX: 28.41 KG/M2 | WEIGHT: 181 LBS | DIASTOLIC BLOOD PRESSURE: 87 MMHG | HEART RATE: 56 BPM | HEIGHT: 67 IN | SYSTOLIC BLOOD PRESSURE: 128 MMHG

## 2018-07-12 DIAGNOSIS — M22.2X2 PATELLOFEMORAL DISORDER OF LEFT KNEE: ICD-10-CM

## 2018-07-12 DIAGNOSIS — S86.012D ACHILLES TENDON RUPTURE, LEFT, SUBSEQUENT ENCOUNTER: Primary | ICD-10-CM

## 2018-07-12 DIAGNOSIS — S86.002D INJURY OF LEFT ACHILLES TENDON, SUBSEQUENT ENCOUNTER: ICD-10-CM

## 2018-07-12 PROCEDURE — 99214 OFFICE O/P EST MOD 30 MIN: CPT | Performed by: ORTHOPAEDIC SURGERY

## 2018-07-12 NOTE — PROGRESS NOTES
Assessment/Plan:  1  Achilles tendon rupture, left, subsequent encounter  Ambulatory referral to Physical Therapy       Scribe Attestation    I,:   Vira Moralez am acting as a scribe while in the presence of the attending physician :        I,:   Song Ku MD personally performed the services described in this documentation    as scribed in my presence :          Nancy Carrillo is doing very well  On examination today he does appear to be regaining tone in his left calf  When compared bilaterally  He is still unable to do a single leg heel raise on the left side  However this is something to be expected approximately 12 months status post   I would like picture to continue with physical therapy  I provided a new prescription for this today  He may continue to go to the gym as tolerated  However is discouraged from participating in activities such as basketball that require explosive jumping activities  I would like to see Nancy Carrillo back in 6 weeks for repeat evaluation  Subjective:   Blayne Whitley is a 53 y o  male who is 101 days status post left Achilles tendon repair  He states that he is experiencing intermittent and mild discomfort about the posterior aspect of his ankle  Additionally he does note pain at the plantar aspect of his foot  As well as anterior aspect of his ankle  However he attributes this to be immobilized for a long period time and is just stiff  He does note intermittent swelling about the posterior lateral aspect of his ankle  However is not painful to touch he is still on blood thinners as he had a previous DVT  He did just fill his last refill on the script  He states that physical therapy is going well and he has graduated completely out of the Cam walker boot with little to no issue  Today he denies any distal paresthesias  Review of Systems   Constitutional: Negative  HENT: Negative  Eyes: Negative  Respiratory: Negative  Cardiovascular: Negative  Gastrointestinal: Negative  Endocrine: Negative  Genitourinary: Negative  Musculoskeletal: Positive for arthralgias and myalgias  Skin: Negative  Allergic/Immunologic: Negative  Neurological: Negative  Hematological: Negative  Psychiatric/Behavioral: Negative  Past Medical History:   Diagnosis Date    Chronic back pain     Fibromyalgia     Hypertension     RA (rheumatoid arthritis) ([facility] [address] )        Past Surgical History:   Procedure Laterality Date    CARPAL TUNNEL RELEASE Left     LATERAL EPICONDYLE RELEASE Right 2/25/2016    Procedure: RELEASE EPICONDYLAR ELBOW, LATERAL;  Surgeon: Sidra Rhodes MD;  Location: [facility];  Service:     MS REPAIR ACHILLES TENDON,PRIMARY Left 4/2/2018    Procedure: REPAIR TENDON ACHILLES;  Surgeon: Domingo Reed MD;  Location: [facility];  Service: Orthopedics    ROTATOR CUFF REPAIR Left     ULNAR TUNNEL RELEASE Left     x2       Family History   Problem Relation Age of Onset    Hypertension Mother     Diabetes Mother        Social History     Occupational History    Not on file       Social History Main Topics    Smoking status: Never Smoker    Smokeless tobacco: Never Used    Alcohol use No    Drug use: No    Sexual activity: Not on file         Current Outpatient Prescriptions:     amLODIPine (NORVASC) 10 mg tablet, Take 10 mg by mouth every evening  , Disp: , Rfl:     apixaban (ELIQUIS) 5 mg, Take 2 tablets (10 mg total) by mouth 2 (two) times a day for 7 days, Disp: 13 tablet, Rfl: 0    Butalbital-APAP-Caffeine (FIORICET PO), Take 1 tablet by mouth daily as needed Unknown dose  , Disp: , Rfl:     Losartan Potassium (COZAAR PO), Take 100 mg by mouth daily  , Disp: , Rfl:     methocarbamol (ROBAXIN) 750 mg tablet, Take 750 mg by mouth 4 (four) times a day, Disp: , Rfl:     metoprolol succinate (TOPROL-XL) 50 mg 24 hr tablet, "I take one a day bedtime", Disp: , Rfl:     Allergies   Allergen Reactions    Lisinopril Other reaction(s): Other (See Comments)  Cough and rash         Reglan [Metoclopramide] Other (See Comments)     Restless; akisthesia         Objective:  Vitals:    07/12/18 1620   BP: 128/87   Pulse: 56       Left Ankle Exam   Swelling: mild    Tenderness   Left ankle tenderness location: Insertion of the Achilles tendon  Medial tubercle the plantar aspect of calcaneus  Range of Motion   Dorsiflexion: 10   Plantar flexion: 40   Inversion: 40   Eversion: 25     Muscle Strength   Dorsiflexion:  5/5   Plantar flexion:  4/5     Other   Erythema: absent  Scars: present  Sensation: normal  Pulse: present    Comments:  No tenderness to deep palpation of the medial gastrocnemius muscle          Observations   Left Ankle/Foot   Positive for adhesive scar  Strength/Myotome Testing     Left Ankle/Foot   Dorsiflexion: 5  Plantar flexion: 4      Physical Exam   Constitutional: He is oriented to person, place, and time  He appears well-developed and well-nourished  HENT:   Head: Normocephalic and atraumatic  Eyes: Conjunctivae are normal    Neck: Normal range of motion  Cardiovascular: Normal rate  Pulmonary/Chest: Effort normal    Musculoskeletal:   As noted in HPI   Neurological: He is alert and oriented to person, place, and time  Skin: Skin is warm and dry  Psychiatric: He has a normal mood and affect   His behavior is normal  Judgment and thought content normal

## 2018-07-16 ENCOUNTER — OFFICE VISIT (OUTPATIENT)
Dept: PHYSICAL THERAPY | Facility: CLINIC | Age: 48
End: 2018-07-16
Payer: COMMERCIAL

## 2018-07-16 DIAGNOSIS — M22.2X2 PATELLOFEMORAL PAIN SYNDROME OF LEFT KNEE: ICD-10-CM

## 2018-07-16 DIAGNOSIS — Z98.890 STATUS POST ACHILLES TENDON REPAIR: ICD-10-CM

## 2018-07-16 DIAGNOSIS — M22.2X2 PATELLOFEMORAL DISORDER OF LEFT KNEE: ICD-10-CM

## 2018-07-16 DIAGNOSIS — S86.002D INJURY OF LEFT ACHILLES TENDON, SUBSEQUENT ENCOUNTER: Primary | ICD-10-CM

## 2018-07-16 PROCEDURE — 97112 NEUROMUSCULAR REEDUCATION: CPT | Performed by: PHYSICAL THERAPIST

## 2018-07-16 PROCEDURE — 97140 MANUAL THERAPY 1/> REGIONS: CPT | Performed by: PHYSICAL THERAPIST

## 2018-07-16 PROCEDURE — 97110 THERAPEUTIC EXERCISES: CPT | Performed by: PHYSICAL THERAPIST

## 2018-07-16 NOTE — PROGRESS NOTES
Daily Note     Today's date: 2018  Patient name: Steve Rice  : 1970  MRN: [id number]  Referring provider: Roscoe Torres MD  Dx:   Encounter Diagnosis     ICD-10-CM    1  Injury of left Achilles tendon, subsequent encounter S86 002D    2  Patellofemoral disorder of left knee M22 2X2    3  Status post Achilles tendon repair Z98 890    4  Patellofemoral pain syndrome of left knee M22 2X2                   Subjective: Pt notes that he is doing will today  He would really like to focus on trying to get a SL heel raise down  He was at the gym earlier, was loose and was working on a SL heel raise at the gym  He deferred TM because he was at the gym earlier  Objective: See treatment diary below      Assessment: Tolerated treatment well  Patient demonstrated fatigue post treatment, exhibited good technique with therapeutic exercises, would benefit from continued PT and was able to tolerate increased resistance for TE/NRE today  He wanted to focus on working on SL heel raise, therfore held knee exercises today as per request        Plan: Continue per plan of care  Progress treatment as tolerated          Manual     PROM 10' 10 min  10 min  10'  10'' 10 5'  5 7'  7'    Post talocrural [facility]             5    5 -TS,DPT  3'  3'                                                                                 Exercise Diary             SLR NP 2# NP 2#           Standing wobble board SL 20x ap/ml SL 20x ap/ml           Seated wobble board SL 20x cw/ccw SL 20x CW/CCW           Standing hip 3 way 2# 20x NP           SLS 5"x20 Foam 10"x10           Walking laps- 25% WB NP NP            Standing HR/TR 20x- more SL 20x- more SL           Leg press HR  70# 20x 75# 20x           squats Foam 20x Foam 20x           TM 5' NP           TB pulls 4way w/ SLS NP NP           SLS cone taps 5 laps no foam 5 laps foam           gastroc stretch 30"x5 30"x5       Modalities  5/16 6/1 6/4 6/6 6/11 6/13 6/20 7/9 7/16     CP 10  10' 10 10   10'  5 ' declined  10'  defered

## 2018-07-18 ENCOUNTER — APPOINTMENT (OUTPATIENT)
Dept: PHYSICAL THERAPY | Facility: CLINIC | Age: 48
End: 2018-07-18
Payer: COMMERCIAL

## 2018-07-20 ENCOUNTER — EVALUATION (OUTPATIENT)
Dept: PHYSICAL THERAPY | Facility: CLINIC | Age: 48
End: 2018-07-20
Payer: COMMERCIAL

## 2018-07-20 DIAGNOSIS — M22.2X2 PATELLOFEMORAL DISORDER OF LEFT KNEE: ICD-10-CM

## 2018-07-20 DIAGNOSIS — S86.002D INJURY OF LEFT ACHILLES TENDON, SUBSEQUENT ENCOUNTER: Primary | ICD-10-CM

## 2018-07-20 DIAGNOSIS — M22.2X2 PATELLOFEMORAL PAIN SYNDROME OF LEFT KNEE: ICD-10-CM

## 2018-07-20 DIAGNOSIS — Z98.890 STATUS POST ACHILLES TENDON REPAIR: ICD-10-CM

## 2018-07-20 PROCEDURE — 97112 NEUROMUSCULAR REEDUCATION: CPT | Performed by: PHYSICAL THERAPIST

## 2018-07-20 PROCEDURE — G8990 OTHER PT/OT CURRENT STATUS: HCPCS | Performed by: PHYSICAL THERAPIST

## 2018-07-20 PROCEDURE — G8991 OTHER PT/OT GOAL STATUS: HCPCS | Performed by: PHYSICAL THERAPIST

## 2018-07-20 PROCEDURE — 97140 MANUAL THERAPY 1/> REGIONS: CPT | Performed by: PHYSICAL THERAPIST

## 2018-07-20 PROCEDURE — 97110 THERAPEUTIC EXERCISES: CPT | Performed by: PHYSICAL THERAPIST

## 2018-07-20 NOTE — PROGRESS NOTES
PT Re-Evaluation    Today's date: 2018  Patient name: Neela Munoz  : 1970  MRN: [id number]  Referring provider: Stacey Patel MD  Dx:   Encounter Diagnosis     ICD-10-CM    1  Injury of left Achilles tendon, subsequent encounter S86 002D    2  Patellofemoral disorder of left knee M22 2X2    3  Status post Achilles tendon repair Z98 890    4  Patellofemoral pain syndrome of left knee M22 2X2                   Assessment  Impairments: abnormal gait, abnormal or restricted ROM, impaired balance, impaired physical strength, pain with function and weight-bearing intolerance    Assessment details: Neela Munoz has attended 22 visits since initiating skilled PT and has demonstrated overall improvement in mobility, strength, and function, with a reduction in pain  Currently, he has made steady progress towards his goals, but continue to remain limited with strength, and functionally he is limited with playing with his grandchildren, dancing and prolonged standing/walking  At this time, skilled physical therapy is warranted in order to address his remaining impairments and aide in return to functional activities  It is recommended that they continue to be seen 2x/week for an additional 4 weeks  Thank you for this pleasant referral!     Understanding of Dx/Px/POC: good   Prognosis: good    Goals  ST-6 weeks  1  Patient to be independent with HEP  -Met  2  Decrease pain at least 2 subjective levels  Met     LT-12 weeks  1    Patient to voice comfort with self management of condition -Met  2   75% or > decreased pain -Partially Met  3   75% or > decreased functional deficits -Partially Met  4  Normalize AROM of all deficit planes  -Met  5  Normalize strength -Not Met  6  Functional Status Score: 55 -Partially Met  7  Patient to voice understanding of activities/positions to avoid  - Met  8    Normalize Gait- Partially Met    Plan  Patient would benefit from: skilled PT  Referral necessary: No  Planned modality interventions: cryotherapy and TENS  Planned therapy interventions: IADL retraining, joint mobilization, manual therapy, motor coordination training, neuromuscular re-education, patient education, self care, strengthening, stretching, therapeutic activities, therapeutic exercise, home exercise program, flexibility, ADL training, balance, body mechanics training, balance/weight bearing training and gait training  Frequency: 2x week  Duration in weeks: 8  Treatment plan discussed with: patient        Subjective Evaluation    History of Present Illness  Date of onset: 3/29/2018  Date of surgery: 2018  Mechanism of injury: Chief Complaint: L achilles pain, limited mobility, L knee pain     History: Pt has remained compliant with restrictions and HEP/Therapy  He notes that he has been progressing well and is able to walk further distance  He notes that he is unable to perform a SL heel raise or walk for prolonged distances  He also is unable to dance       PMH: L knee pain, DVT, blood thinners x 3 mo, carpal tunnel surgeries, RTC surgeries     Aggravating factors: WB, walking     Functional Deficits: Walking, Going to the gym, standing prolonged, sleeping     Patient Goals: Get back to standing, normal walking    Quality of life: good    Pain  At best pain ratin  At worst pain ratin  Location: L Knee      Diagnostic Tests  MRI studies: abnormal        Objective     Active Range of Motion   Left Knee   Flexion: WFL  Extension: 5 degrees     Right Ankle/Foot   Dorsiflexion (ke): 15 degrees   Dorsiflexion (kf): 15 degrees   Plantar flexion: 50 degrees   Inversion: 40 degrees   Eversion: 20 degrees   Great toe extension: 70 degrees     Passive Range of Motion   Left Knee   Flexion: WFL  Extension: 0 degrees WFL    Right Ankle/Foot    Dorsiflexion (ke): WFL  Dorsiflexion (kf): WFL   Plantar flexion: WFL  Inversion: WFL  Eversion: WFL  Great toe extension: [facility]    Strength/Myotome Testing Left Knee   Flexion: 3+  Extension: 3+    Right Ankle/Foot   Dorsiflexion: 4+  Plantar flexion: 3  Inversion: 4+  Eversion: 4  Great toe extension: 4+    Tests     Additional Tests Details  (+) TTP Medial Joint Line, M/L Patellar Grooves  SLR WFL  (+) Patellar Compression  No pain with DL Squat  Incision closed, healing welll  No longer wears CAM boot, ambulates with regular sneaker   Lack of push off on his R LE  Min swelling t/o L ankle  SLS- able to perform on foam with moderate sway  DL Squat: 85 with UE support  Difficulty with HR- but able to complete a heel raise through 1/2 the available ROM            Manual  7/20 6/6 6/11 6/13 6/18 6/20 6/25 6/27 7/9 7/16   PROM 7' 10 min  10 min  10'  10'' 10 5'  5 7'  7'    Post talocrural [facility] [address]  3'           5    5 -TS,DPT  3'  3'                                                                                 Exercise Diary  7/9 7/16 7/20          SLR NP 2# NP 2# NP 2#          Standing wobble board SL 20x ap/ml SL 20x ap/ml SL 20x ap/ml          Seated wobble board SL 20x cw/ccw SL 20x CW/CCW SL 20x CW/CCW          Standing hip 3 way 2# 20x NP NP          SLS 5"x20 Foam 10"x10 Foam 10"x10          Walking laps- 25% WB NP NP NP           Standing HR/TR 20x- more SL 20x- more SL 20x- more SL          Leg press HR  70# 20x 75# 20x 75# 20x          squats Foam 20x Foam 20x Foam 20x          TM 5' NP NP          TB pulls 4way w/ SLS NP NP NP          SLS cone taps 5 laps no foam 5 laps foam 5 laps foam          gastroc stretch 30"x5 30"x5 30"x5                                                                                                                                                  Modalities  5/16 6/1 6/4 6/6 6/11 6/13 6/20 7/9 7/16 7/20   CP 10  10' 10 10   10'  5 ' declined  10'  defered  10'

## 2018-07-23 ENCOUNTER — OFFICE VISIT (OUTPATIENT)
Dept: PHYSICAL THERAPY | Facility: CLINIC | Age: 48
End: 2018-07-23
Payer: COMMERCIAL

## 2018-07-23 DIAGNOSIS — Z98.890 STATUS POST ACHILLES TENDON REPAIR: ICD-10-CM

## 2018-07-23 DIAGNOSIS — M22.2X2 PATELLOFEMORAL PAIN SYNDROME OF LEFT KNEE: ICD-10-CM

## 2018-07-23 DIAGNOSIS — S86.002D INJURY OF LEFT ACHILLES TENDON, SUBSEQUENT ENCOUNTER: Primary | ICD-10-CM

## 2018-07-23 DIAGNOSIS — M22.2X2 PATELLOFEMORAL DISORDER OF LEFT KNEE: ICD-10-CM

## 2018-07-23 PROCEDURE — 97112 NEUROMUSCULAR REEDUCATION: CPT | Performed by: PHYSICAL THERAPIST

## 2018-07-23 PROCEDURE — 97110 THERAPEUTIC EXERCISES: CPT | Performed by: PHYSICAL THERAPIST

## 2018-07-23 NOTE — PROGRESS NOTES
Daily Note     Today's date: 2018  Patient name: Steve Rice  : 1970  MRN: [id number]  Referring provider: Roscoe Torres MD  Dx:   Encounter Diagnosis     ICD-10-CM    1  Injury of left Achilles tendon, subsequent encounter S86 002D    2  Patellofemoral disorder of left knee M22 2X2    3  Status post Achilles tendon repair Z98 890    4  Patellofemoral pain syndrome of left knee M22 2X2                   Subjective: He states he still has difficulty with SL HR but he has been consistent in his gym program     Objective: See treatment diary below      Assessment: Tolerated treatment well  Patient demonstrated fatigue post treatment  He was able to tolerate HR on TG well and on LP and will advance as tolerated  Educated patient on performing HR with foot in IR/ER  PROM np this visit due to focus on dynamic exercises today  Plan: Continue per plan of care           Manual     PROM 7' np  10 min  10'  10'' 10 5'  5 7'  7'    Post talocrural [address]  3'           5    5 -TS,DPT  3'  3'                                                                                 Exercise Diary           SLR NP 2# NP 2# NP 2#          Standing wobble board SL 20x ap/ml SL 20x ap/ml SL 20x ap/ml SL 20x ap/ml         Seated wobble board SL 20x cw/ccw SL 20x CW/CCW SL 20x CW/CCW          Standing hip 3 way 2# 20x NP NP          SLS 5"x20 Foam 10"x10 Foam 10"x10 Foam 10"x10         Walking laps- 25% WB NP NP NP           Standing HR/TR 20x- more SL 20x- more SL 20x- more SL 20x         Leg press HR  70# 20x 75# 20x 75# 20x #75 3x25 incr nv         squats Foam 20x Foam 20x Foam 20x          TM 5' NP NP          TB pulls 4way w/ SLS NP NP NP          SLS cone taps 5 laps no foam 5 laps foam 5 laps foam 5 laps foam         gastroc stretch 30"x5 30"x5 30"x5 30"x5         Total gym L 16    4x 20         Jogging lap    1x       Modalities  5/16 6/1 6/4 6/6 6/11 6/13 6/20 7/9 7/16 7/20   CP 10  10' 10 10   10'  5 ' declined  10'  defered  10'

## 2018-07-27 ENCOUNTER — OFFICE VISIT (OUTPATIENT)
Dept: PHYSICAL THERAPY | Facility: CLINIC | Age: 48
End: 2018-07-27
Payer: COMMERCIAL

## 2018-07-27 DIAGNOSIS — S86.002D INJURY OF LEFT ACHILLES TENDON, SUBSEQUENT ENCOUNTER: Primary | ICD-10-CM

## 2018-07-27 DIAGNOSIS — M22.2X2 PATELLOFEMORAL DISORDER OF LEFT KNEE: ICD-10-CM

## 2018-07-27 DIAGNOSIS — M22.2X2 PATELLOFEMORAL PAIN SYNDROME OF LEFT KNEE: ICD-10-CM

## 2018-07-27 DIAGNOSIS — Z98.890 STATUS POST ACHILLES TENDON REPAIR: ICD-10-CM

## 2018-07-27 PROCEDURE — 97112 NEUROMUSCULAR REEDUCATION: CPT

## 2018-07-27 PROCEDURE — 97110 THERAPEUTIC EXERCISES: CPT

## 2018-07-27 NOTE — PROGRESS NOTES
Daily Note     Today's date: 2018  Patient name: Jose Beck  : 1970  MRN: [id number]  Referring provider: Sheryle Oakland, MD  Dx:   Encounter Diagnosis     ICD-10-CM    1  Injury of left Achilles tendon, subsequent encounter S86 002D    2  Patellofemoral disorder of left knee M22 2X2    3  Status post Achilles tendon repair Z98 890    4  Patellofemoral pain syndrome of left knee M22 2X2        Start Time: 1200  Stop Time: 1255  Total time in clinic (min): 55 minutes    Subjective: Pt states he has no pain, only discomfort and that he walk one mile before session  Objective: See treatment diary below      Assessment: Tolerated treatment well  Patient demonstrated fatigue post treatment  TE increased this session  Pt still unable to do single leg HR  Jogging on TM tolerated well  Pt toes in on left side during jogging, but is able to correct after VC  Plan: Continue per plan of care           Manual     PROM 7' np  10' 10'  10'' 10 5'  5 7'  7'    Post talocrural [address] [address]  3'          5    5 -TS,DPT  3'  3'                                                                              Exercise Diary          SLR NP 2# NP 2# NP 2#          Standing wobble board SL 20x ap/ml SL 20x ap/ml SL 20x ap/ml SL 20x ap/ml SL 20x ap/ml        Seated wobble board SL 20x cw/ccw SL 20x CW/CCW SL 20x CW/CCW          Standing hip 3 way 2# 20x NP NP          SLS 5"x20 Foam 10"x10 Foam 10"x10 Foam 10"x10 Foam  10"x10        Walking laps- 25% WB NP NP NP           Standing HR/TR 20x- more SL 20x- more SL 20x- more SL 20x 20x Both legs        Leg press HR  70# 20x 75# 20x 75# 20x #75 3x25 incr nv 85#  3x25        squats Foam 20x Foam 20x Foam 20x          TM 5' NP NP  15'          TB pulls 4way w/ SLS NP NP NP          SLS cone taps 5 laps no foam 5 laps foam 5 laps foam 5 laps foam 5 laps foam        gastroc stretch 30"x5 30"x5 30"x5 30"x5 30"x5        Total gym L 16    4x 20 5x20        Jogging lap    1x TM                                                                                                                      Modalities  7/27 6/1 6/4 6/6 6/11 6/13 6/20 7/9 7/16 7/20   CP 10' 10' 10 10   10'  5 ' declined  10'  defered  10'

## 2018-07-30 ENCOUNTER — OFFICE VISIT (OUTPATIENT)
Dept: PHYSICAL THERAPY | Facility: CLINIC | Age: 48
End: 2018-07-30
Payer: COMMERCIAL

## 2018-07-30 DIAGNOSIS — S86.002D INJURY OF LEFT ACHILLES TENDON, SUBSEQUENT ENCOUNTER: Primary | ICD-10-CM

## 2018-07-30 DIAGNOSIS — M22.2X2 PATELLOFEMORAL PAIN SYNDROME OF LEFT KNEE: ICD-10-CM

## 2018-07-30 DIAGNOSIS — M22.2X2 PATELLOFEMORAL DISORDER OF LEFT KNEE: ICD-10-CM

## 2018-07-30 DIAGNOSIS — Z98.890 STATUS POST ACHILLES TENDON REPAIR: ICD-10-CM

## 2018-07-30 PROCEDURE — 97110 THERAPEUTIC EXERCISES: CPT | Performed by: PHYSICAL THERAPIST

## 2018-07-30 PROCEDURE — 97112 NEUROMUSCULAR REEDUCATION: CPT | Performed by: PHYSICAL THERAPIST

## 2018-07-30 PROCEDURE — 97010 HOT OR COLD PACKS THERAPY: CPT | Performed by: PHYSICAL THERAPIST

## 2018-07-30 PROCEDURE — 97140 MANUAL THERAPY 1/> REGIONS: CPT | Performed by: PHYSICAL THERAPIST

## 2018-07-30 NOTE — PROGRESS NOTES
Daily Note     Today's date: 2018  Patient name: Flory Garza  : 1970  MRN: [id number]  Referring provider: Cynthia Gonzales MD  Dx:   Encounter Diagnosis     ICD-10-CM    1  Injury of left Achilles tendon, subsequent encounter S86 002D    2  Patellofemoral disorder of left knee M22 2X2    3  Status post Achilles tendon repair Z98 890    4  Patellofemoral pain syndrome of left knee M22 2X2                   Subjective: Pt notes that he was changing a tire yesterday and he hurt his back  Due to this, he would like to hold on any jogging or running activities  Objective: See treatment diary below      Assessment: Tolerated treatment well  Patient demonstrated fatigue post treatment, exhibited good technique with therapeutic exercises, would benefit from continued PT and was able to complete all TE/NRE despite having back pain  He remains most limited with SL heel raise  Plan: Continue per plan of care  Progress treatment as tolerated        Manual     PROM 7' np  10' 7'  10'' 10 5'  5 7'  7'    Post talocrural [facility] [address]  3'     3'     5    5 -TS,DPT  3'  3'                                                                              Exercise Diary         SLR NP 2# NP 2# NP 2#          Standing wobble board SL 20x ap/ml SL 20x ap/ml SL 20x ap/ml SL 20x ap/ml SL 20x ap/ml SL 20x ap/ml       Seated wobble board SL 20x cw/ccw SL 20x CW/CCW SL 20x CW/CCW          Standing hip 3 way 2# 20x NP NP          SLS 5"x20 Foam 10"x10 Foam 10"x10 Foam 10"x10 Foam  10"x10 Foam 10"x10       Walking laps- 25% WB NP NP NP           Standing HR/TR 20x- more SL 20x- more SL 20x- more SL 20x 20x Both legs 20x more SL       Leg press HR  70# 20x 75# 20x 75# 20x #75 3x25 incr nv 85#  3x25 85# 3x25       squats Foam 20x Foam 20x Foam 20x          TM 5' NP NP  15'   held       TB pulls 4way w/ SLS NP NP NP          SLS cone taps 5 laps no foam 5 laps foam 5 laps foam 5 laps foam 5 laps foam 5 laps foam       gastroc stretch 30"x5 30"x5 30"x5 30"x5 30"x5 30"x5       Total gym L 16    4x 20 5x20 5x20       Jogging lap    1x TM                                                                                                                      Modalities  7/27 7/30 6/4 6/6 6/11 6/13 6/20  7/9 7/16 7/20   CP 10' 10' 10 10   10'  5 ' declined  10'  defered  10'

## 2018-08-03 ENCOUNTER — OFFICE VISIT (OUTPATIENT)
Dept: PHYSICAL THERAPY | Facility: CLINIC | Age: 48
End: 2018-08-03
Payer: COMMERCIAL

## 2018-08-03 DIAGNOSIS — M22.2X2 PATELLOFEMORAL PAIN SYNDROME OF LEFT KNEE: ICD-10-CM

## 2018-08-03 DIAGNOSIS — Z98.890 STATUS POST ACHILLES TENDON REPAIR: ICD-10-CM

## 2018-08-03 DIAGNOSIS — S86.002D INJURY OF LEFT ACHILLES TENDON, SUBSEQUENT ENCOUNTER: Primary | ICD-10-CM

## 2018-08-03 DIAGNOSIS — M22.2X2 PATELLOFEMORAL DISORDER OF LEFT KNEE: ICD-10-CM

## 2018-08-03 PROCEDURE — 97112 NEUROMUSCULAR REEDUCATION: CPT

## 2018-08-03 PROCEDURE — 97140 MANUAL THERAPY 1/> REGIONS: CPT

## 2018-08-03 PROCEDURE — 97110 THERAPEUTIC EXERCISES: CPT

## 2018-08-03 NOTE — PROGRESS NOTES
Daily Note     Today's date: 8/3/2018  Patient name: Verenice Pendleton  : 1970  MRN: [id number]  Referring provider: Josué Lockhart MD  Dx:   Encounter Diagnosis     ICD-10-CM    1  Injury of left Achilles tendon, subsequent encounter S86 002D    2  Patellofemoral disorder of left knee M22 2X2    3  Status post Achilles tendon repair Z98 890    4  Patellofemoral pain syndrome of left knee M22 2X2        Start Time: 1100  Stop Time: 1145  Total time in clinic (min): 45 minutes    Subjective: Pt notes that his back is still bothering him  Pt states, "I only have discomfort in my heel"      Objective: See treatment diary below      Assessment: Tolerated treatment well  Pt was able to do two single limb Heel raises this session, performed 20x with B LE  Pt is limited this session with activity tolerance due to back pain  No TE progressed this session  Plan: Continue per plan of care  Progress treatment as tolerated        Manual   8/3 6/20 6/25 6/27 7/9 7/16   PROM 7' np  10' 7' 8' 10 5'  5 7'  7'    Post talocrural [facility] [address]  3'     3' AR   5    5 -TS,DPT  3'  3'                                                                           Exercise Diary   8/3      SLR NP 2# NP 2# NP 2#          Standing wobble board SL 20x ap/ml SL 20x ap/ml SL 20x ap/ml SL 20x ap/ml SL 20x ap/ml SL 20x ap/ml SL 20x  Ap ml      Seated wobble board SL 20x cw/ccw SL 20x CW/CCW SL 20x CW/CCW          Standing hip 3 way 2# 20x NP NP          SLS 5"x20 Foam 10"x10 Foam 10"x10 Foam 10"x10 Foam  10"x10 Foam 10"x10 Foam  10"x10      Walking laps- 25% WB NP NP NP           Standing HR/TR 20x- more SL 20x- more SL 20x- more SL 20x 20x Both legs 20x more SL 2x Sl  20x B      Leg press HR  70# 20x 75# 20x 75# 20x #75 3x25 incr nv 85#  3x25 85# 3x25 80#  4x10      squats Foam 20x Foam 20x Foam 20x          TM 5' NP NP  15'   held walking 10'      TB pulls 4way w/ SLS NP NP NP SLS cone taps 5 laps no foam 5 laps foam 5 laps foam 5 laps foam 5 laps foam 5 laps foam 5 laps  foam      gastroc stretch 30"x5 30"x5 30"x5 30"x5 30"x5 30"x5 30"x5      Total gym L 16    4x 20 5x20 5x20 np tme      Jogging lap    1x TM  np back pain                                                                                                                    Modalities  7/27 7/30 8/3 6/6  6/11 6/13 6/20  7/9 7/16  7/20   CP 10' 10' np defered 10   10'  5 ' declined  10'  defered  10'

## 2018-08-06 ENCOUNTER — OFFICE VISIT (OUTPATIENT)
Dept: PHYSICAL THERAPY | Facility: CLINIC | Age: 48
End: 2018-08-06
Payer: COMMERCIAL

## 2018-08-06 DIAGNOSIS — S86.002D INJURY OF LEFT ACHILLES TENDON, SUBSEQUENT ENCOUNTER: Primary | ICD-10-CM

## 2018-08-06 DIAGNOSIS — Z98.890 STATUS POST ACHILLES TENDON REPAIR: ICD-10-CM

## 2018-08-06 DIAGNOSIS — M22.2X2 PATELLOFEMORAL PAIN SYNDROME OF LEFT KNEE: ICD-10-CM

## 2018-08-06 DIAGNOSIS — M22.2X2 PATELLOFEMORAL DISORDER OF LEFT KNEE: ICD-10-CM

## 2018-08-06 PROCEDURE — 97010 HOT OR COLD PACKS THERAPY: CPT | Performed by: PHYSICAL THERAPIST

## 2018-08-06 PROCEDURE — 97140 MANUAL THERAPY 1/> REGIONS: CPT | Performed by: PHYSICAL THERAPIST

## 2018-08-06 PROCEDURE — 97112 NEUROMUSCULAR REEDUCATION: CPT | Performed by: PHYSICAL THERAPIST

## 2018-08-06 NOTE — PROGRESS NOTES
Daily Note     Today's date: 2018  Patient name: Steve Rice  : 1970  MRN: [id number]  Referring provider: Roscoe Torres MD  Dx:   Encounter Diagnosis     ICD-10-CM    1  Injury of left Achilles tendon, subsequent encounter S86 002D    2  Patellofemoral disorder of left knee M22 2X2    3  Status post Achilles tendon repair Z98 890    4  Patellofemoral pain syndrome of left knee M22 2X2                   Subjective: Pt notes that he is doing well today  He arrived 15 minutes late and requested to be done by 12:45 due to his ride  He was accommodated  Objective: See treatment diary below      Assessment: Tolerated treatment well  Patient demonstrated fatigue post treatment, exhibited good technique with therapeutic exercises, would benefit from continued PT and held most TE/NRE today due to patient time constraints  He performed leg press, walking, and jogging activities at the gym prior to his appointment  Will have patient run in clinic NV to further assess mechanis,      Plan: Continue per plan of care  Progress treatment as tolerated            Manual  7/20 7/23 7/27 7/30 8/3 8/6 6/25 6/27 7/9 7/16   PROM 7' np  10' 7' 8' 8' 5'  5 7'  7'    Post talocrural [facility] [address]  3'     3' AR  2' 5    5 -TS,DPT  3'  3'                                                                           Exercise Diary  7/9 7/16 7/20 7/23 7/27 7/30 8/3 8/6     SLR NP 2# NP 2# NP 2#          Standing wobble board SL 20x ap/ml SL 20x ap/ml SL 20x ap/ml SL 20x ap/ml SL 20x ap/ml SL 20x ap/ml SL 20x  Ap ml SL 20x AP ML     Seated wobble board SL 20x cw/ccw SL 20x CW/CCW SL 20x CW/CCW          Standing hip 3 way 2# 20x NP NP     20x     SLS 5"x20 Foam 10"x10 Foam 10"x10 Foam 10"x10 Foam  10"x10 Foam 10"x10 Foam  10"x10 Foam 10"x10     Walking laps- 25% WB NP NP NP           Standing HR/TR 20x- more SL 20x- more SL 20x- more SL 20x 20x Both legs 20x more SL 2x Sl  20x B 20x more SL     Leg press HR  70# 20x 75# 20x 75# 20x #75 3x25 incr nv 85#  3x25 85# 3x25 80#  4x10 held     squats Foam 20x Foam 20x Foam 20x     held     TM 5' NP NP  15'   held walking 10' held     TB pulls 4way w/ SLS NP NP NP          SLS cone taps 5 laps no foam 5 laps foam 5 laps foam 5 laps foam 5 laps foam 5 laps foam 5 laps  foam 5 laps foam     gastroc stretch 30"x5 30"x5 30"x5 30"x5 30"x5 30"x5 30"x5 held     Total gym L 16    4x 20 5x20 5x20 np tme held     Jogging lap    1x TM  np back pain held                                                                                                                   Modalities  7/27 7/30 8/3 8/6  6/11 6/13 6/20  7/9  7/16  7/20   CP 10' 10' np defered 10'   10'  5 ' declined  10'  defered  10'

## 2018-08-10 ENCOUNTER — APPOINTMENT (OUTPATIENT)
Dept: PHYSICAL THERAPY | Facility: CLINIC | Age: 48
End: 2018-08-10
Payer: COMMERCIAL

## 2018-08-13 ENCOUNTER — OFFICE VISIT (OUTPATIENT)
Dept: PHYSICAL THERAPY | Facility: CLINIC | Age: 48
End: 2018-08-13
Payer: COMMERCIAL

## 2018-08-13 DIAGNOSIS — S86.002D INJURY OF LEFT ACHILLES TENDON, SUBSEQUENT ENCOUNTER: Primary | ICD-10-CM

## 2018-08-13 DIAGNOSIS — M22.2X2 PATELLOFEMORAL PAIN SYNDROME OF LEFT KNEE: ICD-10-CM

## 2018-08-13 DIAGNOSIS — Z98.890 STATUS POST ACHILLES TENDON REPAIR: ICD-10-CM

## 2018-08-13 DIAGNOSIS — M22.2X2 PATELLOFEMORAL DISORDER OF LEFT KNEE: ICD-10-CM

## 2018-08-13 PROCEDURE — 97112 NEUROMUSCULAR REEDUCATION: CPT

## 2018-08-13 PROCEDURE — 97110 THERAPEUTIC EXERCISES: CPT

## 2018-08-13 PROCEDURE — 97140 MANUAL THERAPY 1/> REGIONS: CPT

## 2018-08-13 NOTE — PROGRESS NOTES
Daily Note     Today's date: 2018  Patient name: Antonia Jackson  : 1970  MRN: [id number]  Referring provider: Morgan Montes MD  Dx:   Encounter Diagnosis     ICD-10-CM    1  Injury of left Achilles tendon, subsequent encounter S86 002D    2  Patellofemoral disorder of left knee M22 2X2    3  Status post Achilles tendon repair Z98 890    4  Patellofemoral pain syndrome of left knee M22 2X2                   Subjective: Patient states that he has been very active at the gym and has resumed jogging   States that he has noticed an improvement with       Objective: See treatment diary below    Manual  7/20 7/23 7/27 7/30 8/3 8/6 8/13 6/27 7/9 7/16   PROM 7' np  10' 7' 8' 8' 5'  5 7'  7'    Post talocrural [facility] [address]  3'     3' AR  2' 5- AR    5 -TS,DPT  3'  3'                                                                           Exercise Diary  7/9 7/16 7/20 7/23 7/27 7/30 8/3 8/6 8/13    SLR NP 2# NP 2# NP 2#          Standing wobble board SL 20x ap/ml SL 20x ap/ml SL 20x ap/ml SL 20x ap/ml SL 20x ap/ml SL 20x ap/ml SL 20x  Ap ml SL 20x AP ML SL 20 x AP ML    Seated wobble board SL 20x cw/ccw SL 20x CW/CCW SL 20x CW/CCW          Standing hip 3 way 2# 20x NP NP     20x     SLS 5"x20 Foam 10"x10 Foam 10"x10 Foam 10"x10 Foam  10"x10 Foam 10"x10 Foam  10"x10 Foam 10"x10 Disc :10x10    Walking laps- 25% WB NP NP NP           Standing HR/TR 20x- more SL 20x- more SL 20x- more SL 20x 20x Both legs 20x more SL 2x Sl  20x B 20x more SL SL 20x    Leg press HR  70# 20x 75# 20x 75# 20x #75 3x25 incr nv 85#  3x25 85# 3x25 80#  4x10 held 90# x 20    squats Foam 20x Foam 20x Foam 20x     held     TM 5' NP NP  15'   held walking 10' held 2 min- jog    TB pulls 4way w/ SLS NP NP NP          SLS cone taps 5 laps no foam 5 laps foam 5 laps foam 5 laps foam 5 laps foam 5 laps foam 5 laps  foam 5 laps foam 5 laps foam      gastroc stretch 30"x5 30"x5 30"x5 30"x5 30"x5 30"x5 30"x5 held :30x5    Total gym L 16    4x 20 5x20 5x20 np tme held lvl 20 1 5 min    Jogging lap    1x TM  np back pain held                                                                                                                   Modalities  7/27 7/30 8/3 8/6 6/11 6/13 6/20 7/9 7/16 7/20   CP 10' 10' np defered 10'   10'  5 ' declined  10'  defered  10'                                                 Assessment: Tolerated treatment well  Patient exhibited good technique with therapeutic exercises      Plan: Continue per plan of care

## 2018-08-17 ENCOUNTER — OFFICE VISIT (OUTPATIENT)
Dept: PHYSICAL THERAPY | Facility: CLINIC | Age: 48
End: 2018-08-17
Payer: COMMERCIAL

## 2018-08-17 DIAGNOSIS — S86.002D INJURY OF LEFT ACHILLES TENDON, SUBSEQUENT ENCOUNTER: Primary | ICD-10-CM

## 2018-08-17 DIAGNOSIS — M22.2X2 PATELLOFEMORAL DISORDER OF LEFT KNEE: ICD-10-CM

## 2018-08-17 DIAGNOSIS — Z98.890 STATUS POST ACHILLES TENDON REPAIR: ICD-10-CM

## 2018-08-17 DIAGNOSIS — M22.2X2 PATELLOFEMORAL PAIN SYNDROME OF LEFT KNEE: ICD-10-CM

## 2018-08-17 PROCEDURE — 97112 NEUROMUSCULAR REEDUCATION: CPT

## 2018-08-17 PROCEDURE — 97140 MANUAL THERAPY 1/> REGIONS: CPT

## 2018-08-17 PROCEDURE — 97110 THERAPEUTIC EXERCISES: CPT

## 2018-08-17 NOTE — PROGRESS NOTES
Daily Note     Today's date: 2018  Patient name: Jonathan Burgos  : 1970  MRN: [id number]  Referring provider: Wing Newell MD  Dx:   Encounter Diagnosis     ICD-10-CM    1  Injury of left Achilles tendon, subsequent encounter S86 002D    2  Patellofemoral disorder of left knee M22 2X2    3  Status post Achilles tendon repair Z98 890    4  Patellofemoral pain syndrome of left knee M22 2X2        Start Time: 1110  Stop Time: 1200  Total time in clinic (min): 50 minutes    Subjective: Patient states that he is still remaining active and walking everyday  Pt notes pain in left knee before session         Objective: See treatment diary below    Manual  7/20 7/23 7/27 7/30 8/3 8/6 8/13 8/17 7/9 7/16   PROM 7' np  10' 7' 8' 8' 5' 5' 7'  7'    Post talocrural [facility] [address]  3'     3' AR  2' 5- AR   5'- AR  3'  3'                                                                        Exercise Diary   8/3 8/6 8/13 8/17   SLR NP 2# NP 2# NP 2#          Standing wobble board SL 20x ap/ml SL 20x ap/ml SL 20x ap/ml SL 20x ap/ml SL 20x ap/ml SL 20x ap/ml SL 20x  Ap ml SL 20x AP ML SL 20 x AP ML SL 20 x AP ML   Seated wobble board SL 20x cw/ccw SL 20x CW/CCW SL 20x CW/CCW          Standing hip 3 way 2# 20x NP NP     20x     SLS 5"x20 Foam 10"x10 Foam 10"x10 Foam 10"x10 Foam  10"x10 Foam 10"x10 Foam  10"x10 Foam 10"x10 Disc :10x10    Walking laps- 25% WB NP NP NP           Standing HR/TR 20x- more SL 20x- more SL 20x- more SL 20x 20x Both legs 20x more SL 2x Sl  20x B 20x more SL SL 20x SL  20x   Leg press HR  70# 20x 75# 20x 75# 20x #75 3x25 incr nv 85#  3x25 85# 3x25 80#  4x10 held 90# x 20 90# x 50   squats Foam 20x Foam 20x Foam 20x     held     TM 5' NP NP  15'   held walking 10' held 2 min- jog 3 min jog   TB pulls 4way w/ SLS NP NP NP          SLS cone taps 5 laps no foam 5 laps foam 5 laps foam 5 laps foam 5 laps foam 5 laps foam 5 laps  foam 5 laps foam 5 laps foam   5 laps  foam gastroc stretch 30"x5 30"x5 30"x5 30"x5 30"x5 30"x5 30"x5 held :30x5 30"x5   Total gym L 16    4x 20 5x20 5x20 np tme held lvl 20 1 5 min lvl 22  2 min  SL   Jogging lap    1x TM  np back pain held     Foam  Ball throws  trampoline          4x5  SL                                                                                                    Modalities  7/27 7/30 8/3 8/6 8/17 6/13 6/20  7/9 7/16 7/20   CP 10' 10' np defered 10'  10'  5 ' declined  10'  defered  10'                                               Assessment: Tolerated treatment well  Patient exhibited good technique with therapeutic exercises  Pt able to do SL heel raises this session  Trialed ball toss on foam with trampoline, tolerated well  Plan: Continue per plan of care

## 2018-08-20 ENCOUNTER — OFFICE VISIT (OUTPATIENT)
Dept: PHYSICAL THERAPY | Facility: CLINIC | Age: 48
End: 2018-08-20
Payer: COMMERCIAL

## 2018-08-20 DIAGNOSIS — Z98.890 STATUS POST ACHILLES TENDON REPAIR: ICD-10-CM

## 2018-08-20 DIAGNOSIS — M22.2X2 PATELLOFEMORAL DISORDER OF LEFT KNEE: ICD-10-CM

## 2018-08-20 DIAGNOSIS — S86.002D INJURY OF LEFT ACHILLES TENDON, SUBSEQUENT ENCOUNTER: Primary | ICD-10-CM

## 2018-08-20 DIAGNOSIS — M22.2X2 PATELLOFEMORAL PAIN SYNDROME OF LEFT KNEE: ICD-10-CM

## 2018-08-20 PROCEDURE — 97110 THERAPEUTIC EXERCISES: CPT | Performed by: PHYSICAL THERAPIST

## 2018-08-20 PROCEDURE — G8991 OTHER PT/OT GOAL STATUS: HCPCS | Performed by: PHYSICAL THERAPIST

## 2018-08-20 PROCEDURE — 97112 NEUROMUSCULAR REEDUCATION: CPT | Performed by: PHYSICAL THERAPIST

## 2018-08-20 PROCEDURE — G8990 OTHER PT/OT CURRENT STATUS: HCPCS | Performed by: PHYSICAL THERAPIST

## 2018-08-20 NOTE — PROGRESS NOTES
PT Re-Evaluation    Today's date: 2018  Patient name: Marcus Wiggins  : 1970  MRN: [id number]  Referring provider: Aakash Curtis MD  Dx:   Encounter Diagnosis     ICD-10-CM    1  Injury of left Achilles tendon, subsequent encounter S86 002D    2  Patellofemoral disorder of left knee M22 2X2    3  Status post Achilles tendon repair Z98 890    4  Patellofemoral pain syndrome of left knee M22 2X2                   Assessment  Impairments: abnormal gait, abnormal or restricted ROM, impaired balance, impaired physical strength, pain with function and weight-bearing intolerance    Assessment details: Marcus Wiggins has demonstrated overall improvement in ankle strength and functional activities such as running  Currently, he has made steady progress towards his goals, but continue to remain limited with full PF strength, and functionally he is limited dancing and fully playing with his grandchildren  At this time, skilled physical therapy is warranted in order to address his remaining impairments and aide in return to functional activities  It is recommended that they continue to be seen 2x/week for an additional 4 weeks to then transition to independent strengthening program  Thank you for this pleasant referral!     Understanding of Dx/Px/POC: good   Prognosis: good    Goals  ST-6 weeks  1  Patient to be independent with HEP  -Met  2  Decrease pain at least 2 subjective levels  Met     LT-12 weeks  1    Patient to voice comfort with self management of condition -Met  2   75% or > decreased pain -Partially Met  3   75% or > decreased functional deficits -Partially Met  4  Normalize AROM of all deficit planes  -Met  5  Normalize strength -Not Met  6  Functional Status Score: 55 -Partially Met  7  Patient to voice understanding of activities/positions to avoid  - Met  8    Normalize Gait- Met    Plan  Patient would benefit from: skilled PT  Referral necessary: No  Planned modality interventions: cryotherapy and TENS  Planned therapy interventions: IADL retraining, joint mobilization, manual therapy, motor coordination training, neuromuscular re-education, patient education, self care, strengthening, stretching, therapeutic activities, therapeutic exercise, home exercise program, flexibility, ADL training, balance, body mechanics training, balance/weight bearing training and gait training  Frequency: 2x week  Duration in weeks: 8  Treatment plan discussed with: patient        Subjective Evaluation    History of Present Illness  Date of onset: 3/29/2018  Date of surgery: 2018  Mechanism of injury: Chief Complaint: L achilles pain, limited mobility, L knee pain     History: He states he has been back to the gym and progressing very well  He does still have some deficit with heel raises single leg      PMH: L knee pain, DVT, blood thinners x 3 mo, carpal tunnel surgeries, RTC surgeries     Aggravating factors: working out, progressive PF    Functional Deficits: Walking, Going to the gym, standing prolonged, sleeping     Patient Goals: Get back to running  Quality of life: good    Pain  At best pain ratin  At worst pain ratin  Location: L Knee      Diagnostic Tests  MRI studies: abnormal        Objective     Active Range of Motion   Left Knee   Flexion: WFL  Extension: 5 degrees     Right Ankle/Foot   Dorsiflexion (ke): 7 degrees   Dorsiflexion (kf): 15 degrees   Plantar flexion: 40 degrees   Inversion: 48 degrees   Eversion: 17 degrees   Great toe extension: 70 degrees     Passive Range of Motion   Left Knee   Flexion: WFL  Extension: 0 degrees WFL    Right Ankle/Foot    Dorsiflexion (ke): WFL  Dorsiflexion (kf): WFL   Plantar flexion: WFL  Inversion: WFL  Eversion: WFL  Great toe extension: [facility]    Strength/Myotome Testing     Left Knee   Flexion: 3+  Extension: 3+    Right Ankle/Foot   Dorsiflexion: 4+  Plantar flexion: 4  Inversion: 4+  Eversion: 4+  Great toe extension: 4+    Tests Additional Tests Details  (+) TTP Medial Joint Line, M/L Patellar Grooves  SLR WFL  (+) Patellar Compression  No pain with DL Squat  SLS: WNL  DL Squat: 85 with UE support  Difficulty with HR- but able to complete a heel raise through 1/2 the available ROM 10 reps            Subjective: See RE      Objective: See treatment diary below    Manual  7/20 7/23 7/27 7/30 8/3 8/6 8/13 8/17 8/20 7/16   PROM 7' np  10' 7' 8' 8' 5' 5' 5[ 7'    Post talocrural [facility] [address]  3'     3' AR  2' 5- AR   5'- AR  3'  3'                                                                        Exercise Diary  8/20 7/16 7/20 7/23 7/27 7/30 8/3 8/6 8/13 8/17   SLR np NP 2# NP 2#          Standing wobble board SL 20x ap/ml SL 20x ap/ml SL 20x ap/ml SL 20x ap/ml SL 20x ap/ml SL 20x ap/ml SL 20x  Ap ml SL 20x AP ML SL 20 x AP ML SL 20 x AP ML   Seated wobble board SL 20x cw/ccw SL 20x CW/CCW SL 20x CW/CCW          Standing hip 3 way 2# 20x NP NP     20x     SLS 5"x20 Foam 10"x10 Foam 10"x10 Foam 10"x10 Foam  10"x10 Foam 10"x10 Foam  10"x10 Foam 10"x10 Disc :10x10    Walking laps- 25% WB NP NP NP           Standing HR/TR SL 2x10 20x- more SL 20x- more SL 20x 20x Both legs 20x more SL 2x Sl  20x B 20x more SL SL 20x SL  20x   Leg press HR  90# 50x 75# 20x 75# 20x #75 3x25 incr nv 85#  3x25 85# 3x25 80#  4x10 held 90# x 20 90# x 50   squats  20x Foam 20x Foam 20x     held     TM np NP NP  15'   held walking 10' held 2 min- jog 3 min jog   TB pulls 4way w/ SLS NP NP NP          SLS cone taps 5 laps no foam 5 laps foam 5 laps foam 5 laps foam 5 laps foam 5 laps foam 5 laps  foam 5 laps foam 5 laps foam   5 laps  foam   gastroc stretch 30"x5 30"x5 30"x5 30"x5 30"x5 30"x5 30"x5 held :30x5 30"x5   Total gym L 16    4x 20 5x20 5x20 np tme held lvl 20 1 5 min lvl 22  2 min  SL   Jogging lap    1x TM  np back pain held     Foam  Ball throws  trampoline 4x5 SL         4x5  SL       Modalities  7/27 7/30 8/3 8/6 8/17 6/13 6/20 7/9 7/16 7/20   CP 10' 10' np defered 10'  10'  5 ' declined  10'  defered  10'                                               Assessment: Tolerated treatment well  Patient exhibited good technique with therapeutic exercises  Pt wished to leave session early this visit and will continue to progress as tolerated  Plan: Continue per plan of care

## 2018-08-24 ENCOUNTER — OFFICE VISIT (OUTPATIENT)
Dept: PHYSICAL THERAPY | Facility: CLINIC | Age: 48
End: 2018-08-24
Payer: COMMERCIAL

## 2018-08-24 DIAGNOSIS — S86.002D INJURY OF LEFT ACHILLES TENDON, SUBSEQUENT ENCOUNTER: Primary | ICD-10-CM

## 2018-08-24 DIAGNOSIS — M22.2X2 PATELLOFEMORAL DISORDER OF LEFT KNEE: ICD-10-CM

## 2018-08-24 DIAGNOSIS — Z98.890 STATUS POST ACHILLES TENDON REPAIR: ICD-10-CM

## 2018-08-24 DIAGNOSIS — M22.2X2 PATELLOFEMORAL PAIN SYNDROME OF LEFT KNEE: ICD-10-CM

## 2018-08-24 PROCEDURE — 97112 NEUROMUSCULAR REEDUCATION: CPT

## 2018-08-24 PROCEDURE — 97140 MANUAL THERAPY 1/> REGIONS: CPT

## 2018-08-24 PROCEDURE — 97110 THERAPEUTIC EXERCISES: CPT

## 2018-08-24 NOTE — PROGRESS NOTES
Daily Note     Today's date: 2018  Patient name: Gabby Echeverria  : 1970  MRN: [id number]  Referring provider: Pamela Bustamante MD  Dx:   Encounter Diagnosis     ICD-10-CM    1  Injury of left Achilles tendon, subsequent encounter S86 002D    2  Patellofemoral disorder of left knee M22 2X2    3  Status post Achilles tendon repair Z98 890    4  Patellofemoral pain syndrome of left knee M22 2X2        Start Time: 1200  Stop Time: 9052  Total time in clinic (min): 45 minutes    Subjective: Pt reports no pain in left ankle           Objective: See treatment diary below    Manual  7/20 7/23 7/27 7/30 8/3 8/6 8/13 8/17 8/20 8/24   PROM 7' np  10' 7' 8' 8' 5' 5' 5[ LK    Post talocrural [facility] [address]  3'     3' AR  2' 5- AR   5'- AR  3' WA                                                                     Exercise Diary  8/20 8/24 7/20 7/23 7/27 7/30 8/3 8/6 8/13 8/17   SLR np  NP 2#          Standing wobble board SL 20x ap/ml  SL 20x ap/ml SL 20x ap/ml SL 20x ap/ml SL 20x ap/ml SL 20x  Ap ml SL 20x AP ML SL 20 x AP ML SL 20 x AP ML   Seated wobble board SL 20x cw/ccw  SL 20x CW/CCW          Standing hip 3 way 2# 20x  NP     20x     SLS 5"x20  Foam 10"x10 Foam 10"x10 Foam  10"x10 Foam 10"x10 Foam  10"x10 Foam 10"x10 Disc :10x10    Walking laps- 25% WB NP  NP           Standing HR/TR SL 2x10 SL  2x10 20x- more SL 20x 20x Both legs 20x more SL 2x Sl  20x B 20x more SL SL 20x SL  20x   Leg press HR  90# 50x 90#  60x 75# 20x #75 3x25 incr nv 85#  3x25 85# 3x25 80#  4x10 held 90# x 20 90# x 50   squats  20x  Foam 20x     held     TM np 4mph    8' NP  15'   held walking 10' held 2 min- jog 3 min jog   TB pulls 4way w/ SLS NP  NP          SLS cone taps 5 laps no foam  5 laps foam 5 laps foam 5 laps foam 5 laps foam 5 laps  foam 5 laps foam 5 laps foam   5 laps  foam   gastroc stretch 30"x5 30"x5 30"x5 30"x5 30"x5 30"x5 30"x5 held :30x5 30"x5   Total gym L 16    4x 20 5x20 5x20 np tme held lvl 20 1 5 min lvl 22  2 min  SL   Jogging lap    1x TM  np back pain held     Foam  Ball throws  trampoline 4x5 SL 30x  SL        4x5  SL   biodex  LOS  5'  lvl 9                                                                                               Modalities  7/27 7/30 8/3 8/6 8/17 8/24 6/20  7/9  7/16  7/20   CP 10' 10' np defered 10'  10' 10' declined  10'  defered  10'                                               Assessment: Tolerated treatment well  Patient demonstrated fatigue post treatment, exhibited good technique with therapeutic exercises and would benefit from continued PT  Added biodex, tolerated well  Plan: Continue per plan of care

## 2018-08-27 ENCOUNTER — OFFICE VISIT (OUTPATIENT)
Dept: PHYSICAL THERAPY | Facility: CLINIC | Age: 48
End: 2018-08-27
Payer: COMMERCIAL

## 2018-08-27 DIAGNOSIS — M22.2X2 PATELLOFEMORAL DISORDER OF LEFT KNEE: ICD-10-CM

## 2018-08-27 DIAGNOSIS — S86.002D INJURY OF LEFT ACHILLES TENDON, SUBSEQUENT ENCOUNTER: Primary | ICD-10-CM

## 2018-08-27 DIAGNOSIS — M22.2X2 PATELLOFEMORAL PAIN SYNDROME OF LEFT KNEE: ICD-10-CM

## 2018-08-27 DIAGNOSIS — Z98.890 STATUS POST ACHILLES TENDON REPAIR: ICD-10-CM

## 2018-08-27 PROCEDURE — G8991 OTHER PT/OT GOAL STATUS: HCPCS

## 2018-08-27 PROCEDURE — G8990 OTHER PT/OT CURRENT STATUS: HCPCS

## 2018-08-27 PROCEDURE — 97112 NEUROMUSCULAR REEDUCATION: CPT

## 2018-08-27 PROCEDURE — 97110 THERAPEUTIC EXERCISES: CPT

## 2018-08-27 NOTE — PROGRESS NOTES
Daily Note     Today's date: 2018  Patient name: Erin Zuñiga  : 1970  MRN: [id number]  Referring provider: Edy Daniels MD  Dx:   Encounter Diagnosis     ICD-10-CM    1  Injury of left Achilles tendon, subsequent encounter S86 002D    2  Patellofemoral disorder of left knee M22 2X2    3  Status post Achilles tendon repair Z98 890    4  Patellofemoral pain syndrome of left knee M22 2X2                   Subjective: Patient states that he was experiencing an increase in pain on lateral side of arch in L foot today   Unable to complete SLS exercises due to pain but will resume at nv if symptoms approved         Objective: See treatment diary below  Manual  8/27 7/23 7/27 7/30 8/3 8/6 8/13 8/17 8/20 8/24   PROM  np  10' 7' 8' 8' 5' 5' 5[ LK    Post talocrural [facility] [address] [facility]     3' AR  2' 5- AR   5'- AR  3' WA                                                                     Exercise Diary  8/20 8/24 8/27 7/23 7/27 7/30 8/3 8/6 8/13 8/17   SLR np            Standing wobble board SL 20x ap/ml   SL 20x ap/ml SL 20x ap/ml SL 20x ap/ml SL 20x  Ap ml SL 20x AP ML SL 20 x AP ML SL 20 x AP ML   Seated wobble board SL 20x cw/ccw            Standing hip 3 way 2# 20x       20x     SLS 5"x20  held Foam 10"x10 Foam  10"x10 Foam 10"x10 Foam  10"x10 Foam 10"x10 Disc :10x10    Walking laps- 25% WB NP             Standing HR/TR SL 2x10 SL  2x10 SL x 20 20x 20x Both legs 20x more SL 2x Sl  20x B 20x more SL SL 20x SL  20x   Leg press HR  90# 50x 90#  60x 90-# x 60  #75 3x25 incr nv 85#  3x25 85# 3x25 80#  4x10 held 90# x 20 90# x 50   squats  20x       held     TM np 4mph    8' 8 min   15'   held walking 10' held 2 min- jog 3 min jog   TB pulls 4way w/ SLS NP  NV          SLS cone taps 5 laps no foam  NV 5 laps foam 5 laps foam 5 laps foam 5 laps  foam 5 laps foam 5 laps foam   5 laps  foam   gastroc stretch 30"x5 30"x5 :30x5 30"x5 30"x5 30"x5 30"x5 held :30x5 30"x5   Total gym L 16   Lvl 19 x 20 4x 20 5x20 5x20 np tme held lvl 20 1 5 min lvl 22  2 min  SL   Jogging lap    1x TM  np back pain held     Foam  Ball throws  trampoline 4x5 SL 30x  SL SL 2 x 30       4x5  SL   biodex  LOS  5'  lvl 9 LOS/maze lvl                                                                                              Modalities  7/27 7/30 8/3 8/6 8/17 8/24 8/27  7/9  7/16  7/20   CP 10' 10' np defered 10'  10' 10' declined  10'  defered  10'                                             Assessment: Tolerated treatment fair  Patient demonstrated fatigue post treatment      Plan: Continue per plan of care

## 2018-08-31 ENCOUNTER — OFFICE VISIT (OUTPATIENT)
Dept: PHYSICAL THERAPY | Facility: CLINIC | Age: 48
End: 2018-08-31
Payer: COMMERCIAL

## 2018-08-31 DIAGNOSIS — S86.002D INJURY OF LEFT ACHILLES TENDON, SUBSEQUENT ENCOUNTER: Primary | ICD-10-CM

## 2018-08-31 DIAGNOSIS — Z98.890 STATUS POST ACHILLES TENDON REPAIR: ICD-10-CM

## 2018-08-31 DIAGNOSIS — M22.2X2 PATELLOFEMORAL DISORDER OF LEFT KNEE: ICD-10-CM

## 2018-08-31 DIAGNOSIS — M22.2X2 PATELLOFEMORAL PAIN SYNDROME OF LEFT KNEE: ICD-10-CM

## 2018-08-31 PROCEDURE — 97140 MANUAL THERAPY 1/> REGIONS: CPT

## 2018-08-31 PROCEDURE — 97112 NEUROMUSCULAR REEDUCATION: CPT

## 2018-08-31 PROCEDURE — 97110 THERAPEUTIC EXERCISES: CPT

## 2018-08-31 NOTE — PROGRESS NOTES
Daily Note     Today's date: 2018  Patient name: Jose G Villarreal  : 1970  MRN: [id number]  Referring provider: Abbie Cavanaugh MD  Dx:   Encounter Diagnosis     ICD-10-CM    1  Injury of left Achilles tendon, subsequent encounter S86 002D    2  Patellofemoral disorder of left knee M22 2X2    3  Status post Achilles tendon repair Z98 890    4  Patellofemoral pain syndrome of left knee M22 2X2        Start Time: 1135  Stop Time: 1215  Total time in clinic (min): 40 minutes    Subjective: Patient reports pain "4/10" in left ankle, pt notes most of the pain occurs over lateral malleolus         Objective: See treatment diary below  Manual  8/27 8/31 7/27 7/30 8/3 8/6 8/13 8/17 8/20 8/24   PROM  LK 10' 7' 8' 8' 5' 5' 5[ LK    Post talocrural [facility] [address] [facility]    3' AR  2' 5- AR   5'- AR  3' WA                                                                  Exercise Diary  8/20 8/24 8/27 8/31 7/27 7/30 8/3 8/6 8/13 8/17   SLR np            Standing wobble board SL 20x ap/ml    SL 20x ap/ml SL 20x ap/ml SL 20x  Ap ml SL 20x AP ML SL 20 x AP ML SL 20 x AP ML   Seated wobble board SL 20x cw/ccw            Standing hip 3 way 2# 20x       20x     SLS 5"x20  held  Foam  10"x10 Foam 10"x10 Foam  10"x10 Foam 10"x10 Disc :10x10    Walking laps- 25% WB NP             Standing HR/TR SL 2x10 SL  2x10 SL x 20 SL  10x 20x Both legs 20x more SL 2x Sl  20x B 20x more SL SL 20x SL  20x   Leg press HR  90# 50x 90#  60x 90-# x 60   85#  3x25 85# 3x25 80#  4x10 held 90# x 20 90# x 50   squats  20x       held     TM np 4mph    8' 8 min  10 min  jog 15'   held walking 10' held 2 min- jog 3 min jog   TB pulls 4way w/ SLS NP  NV          SLS cone taps 5 laps no foam  NV  5 laps foam 5 laps foam 5 laps  foam 5 laps foam 5 laps foam   5 laps  foam   gastroc stretch 30"x5 30"x5 :30x5 30"x5 30"x5 30"x5 30"x5 held :30x5 30"x5   Total gym L 16   Lvl 19 x 20  5x20 5x20 np tme held lvl 20 1 5 min lvl 22  2 min  SL   Jogging lap     TM  np back pain held     Foam  Ball throws  trampoline 4x5 SL 30x  SL SL 2 x 30 SL 2x30      4x5  SL   biodex  LOS  5'  lvl 9 LOS/maze lvl LOS/maxe lvl 10                                                                                             Modalities  7/27 7/30 8/3 8/6 8/17 8/24 8/27  7/9  7/16  7/20   CP 10' 10' np defered 10'  10' 10' declined  10'  defered  10'                                             Assessment: Tolerated treatment fair  Patient demonstrated fatigue post treatment  Pt able to complete all TE listed above without increase in pain  Time supervised by Dianne Peña [phone]  Plan: Continue per plan of care

## 2018-09-07 ENCOUNTER — APPOINTMENT (OUTPATIENT)
Dept: PHYSICAL THERAPY | Facility: CLINIC | Age: 48
End: 2018-09-07
Payer: COMMERCIAL

## 2018-09-10 ENCOUNTER — OFFICE VISIT (OUTPATIENT)
Dept: PHYSICAL THERAPY | Facility: CLINIC | Age: 48
End: 2018-09-10
Payer: COMMERCIAL

## 2018-09-10 DIAGNOSIS — Z98.890 STATUS POST ACHILLES TENDON REPAIR: ICD-10-CM

## 2018-09-10 DIAGNOSIS — M22.2X2 PATELLOFEMORAL DISORDER OF LEFT KNEE: ICD-10-CM

## 2018-09-10 DIAGNOSIS — M22.2X2 PATELLOFEMORAL PAIN SYNDROME OF LEFT KNEE: ICD-10-CM

## 2018-09-10 DIAGNOSIS — S86.002D INJURY OF LEFT ACHILLES TENDON, SUBSEQUENT ENCOUNTER: Primary | ICD-10-CM

## 2018-09-10 PROCEDURE — 97112 NEUROMUSCULAR REEDUCATION: CPT

## 2018-09-10 PROCEDURE — 97110 THERAPEUTIC EXERCISES: CPT

## 2018-09-10 NOTE — PROGRESS NOTES
Daily Note     Today's date: 9/10/2018  Patient name: Jonathan Burgos  : 1970  MRN: [id number]  Referring provider: Wing Newell MD  Dx:   Encounter Diagnosis     ICD-10-CM    1  Injury of left Achilles tendon, subsequent encounter S86 002D    2  Patellofemoral disorder of left knee M22 2X2    3  Status post Achilles tendon repair Z98 890    4  Patellofemoral pain syndrome of left knee M22 2X2                   Subjective: Patient states that he has noticed an improvement with SL HR strength  Patient states that part of his limitation is L knee pain          Objective: See treatment diary below  Manual  8/27 8/31 9/10 7/30 8/3 8/6 8/13 8/17 8/20 8/24   PROM  LK 10' 7' 8' 8' 5' 5' 5[ LK    Post talocrural [facility] [address] [facility]    3' AR  2' 5- AR   5'- AR  3' WA                                                                  Exercise Diary  8/20 8/24 8/27 8/31 9/10 7/30 8/3 8/6 8/13 8/17   SLR np            Standing wobble board SL 20x ap/ml     SL 20x ap/ml SL 20x  Ap ml SL 20x AP ML SL 20 x AP ML SL 20 x AP ML   Seated wobble board SL 20x cw/ccw            Standing hip 3 way 2# 20x       20x     SLS 5"x20  held   Foam 10"x10 Foam  10"x10 Foam 10"x10 Disc :10x10    Walking laps- 25% WB NP             Standing HR/TR SL 2x10 SL  2x10 SL x 20 SL  10x 20x Both legs 20x more SL 2x Sl  20x B 20x more SL SL 20x SL  20x   Leg press HR  90# 50x 90#  60x 90-# x 60   85#  3x25 85# 3x25 80#  4x10 held 90# x 20 90# x 50   squats  20x       held     TM np 4mph    8' 8 min  10 min  jog held held walking 10' held 2 min- jog 3 min jog   TB pulls 4way w/ SLS NP  NV          SLS cone taps 5 laps no foam  NV  5 laps foam 5 laps foam 5 laps  foam 5 laps foam 5 laps foam   5 laps  foam   gastroc stretch 30"x5 30"x5 :30x5 30"x5 30"x5 30"x5 30"x5 held :30x5 30"x5   Total gym L 16   Lvl 19 x 20  lvl 16 x 20   5x20 np tme held lvl 20 1 5 min lvl 22  2 min  SL   Jogging lap       np back pain held     Foam  Ball throws  trampoline 4x5 SL 30x  SL SL 2 x 30 SL 2x30 SL  2x30 SL 2x30    4x5  SL   biodex  LOS  5'  lvl 9 LOS/maze lvl LOS/maxe lvl 10 Foam - DL LOS/maze x 2 ea lvl10                                                                                            Modalities  7/27 7/30 8/3 8/6 8/17 8/24 8/27  7/9  7/16  7/20   CP 10' 10' np defered 10'  10' 10' declined  10'  defered  10'                                               Assessment: Tolerated treatment fair  Patient exhibited good technique with therapeutic exercises      Plan: Continue per plan of care

## 2018-09-14 ENCOUNTER — OFFICE VISIT (OUTPATIENT)
Dept: PHYSICAL THERAPY | Facility: CLINIC | Age: 48
End: 2018-09-14
Payer: COMMERCIAL

## 2018-09-14 DIAGNOSIS — S86.002D INJURY OF LEFT ACHILLES TENDON, SUBSEQUENT ENCOUNTER: Primary | ICD-10-CM

## 2018-09-14 DIAGNOSIS — Z98.890 STATUS POST ACHILLES TENDON REPAIR: ICD-10-CM

## 2018-09-14 PROCEDURE — 97112 NEUROMUSCULAR REEDUCATION: CPT

## 2018-09-14 PROCEDURE — 97140 MANUAL THERAPY 1/> REGIONS: CPT

## 2018-09-14 PROCEDURE — 97110 THERAPEUTIC EXERCISES: CPT

## 2018-09-14 NOTE — PROGRESS NOTES
Daily Note     Today's date: 2018  Patient name: Erin Zuñiga  : 1970  MRN: [id number]  Referring provider: Edy Daniels MD  Dx:   Encounter Diagnosis     ICD-10-CM    1  Injury of left Achilles tendon, subsequent encounter S86 002D    2  Status post Achilles tendon repair [id number] [id number]         1:1 with PTA CR 11:05- 11:45  Subjective: Sore after walking on TM for 45 mins at gym earlier today  Pain 6/10  Follow up with Dr Shanthi Mcdonnell 18         Objective: See treatment diary below  Manual  8/27 8/31 9/10 9/14 8/3 8/6 8/13 8/17 8/20 8/24   PROM  LK 10' 10' 8' 8' 5' 5' 5[ LK    Post talocrural [facility]WM [address] [facility]     AR  2' 5- AR   5'- AR  3' WA                                                                  Exercise Diary  8/20 8/24 8/27 8/31 9/10 9/14 8/3 8/6 8/13 8/17   SLR np            Standing wobble board SL 20x ap/ml      SL 20x  Ap ml SL 20x AP ML SL 20 x AP ML SL 20 x AP ML   Seated wobble board SL 20x cw/ccw            Standing hip 3 way 2# 20x       20x     SLS 5"x20  held    Foam  10"x10 Foam 10"x10 Disc :10x10    Walking laps- 25% WB NP             Standing HR/TR SL 2x10 SL  2x10 SL x 20 SL  10x 20x Both legs 20x  Both  legs 2x Sl  20x B 20x more SL SL 20x SL  20x   Leg press HR  90# 50x 90#  60x 90-# x 60   85#  3x25 90# 3x20 80#  4x10 held 90# x 20 90# x 50   squats  20x       held     TM np 4mph    8' 8 min  10 min  jog held held walking 10' held 2 min- jog 3 min jog   TB pulls 4way w/ SLS NP  NV          SLS cone taps 5 laps no foam  NV  5 laps foam 5 laps  foam 5 laps  foam 5 laps foam 5 laps foam   5 laps  foam   gastroc stretch 30"x5 30"x5 :30x5 30"x5 30"x5 30"x5 30"x5 held :30x5 30"x5   Total gym L 16   Lvl 19 x 20  lvl 16 x 20   L20  3x20 np tme held lvl 20 1 5 min lvl 22  2 min  SL   Jogging lap       np back pain held     Foam  Ball throws  trampoline 4x5 SL 30x  SL SL 2 x 30 SL 2x30 SL  2x30 SL  2x30    4x5  SL   biodex  LOS  5'  lvl 9 LOS/maze lvl LOS/maxe lvl 10 Foam - DL LOS/maze x 2 ea lvl10 Foam-  DL LOS/maze  x2 ea  L10                                                                 Modalities  7/27 7/30 8/3 8/6 8/17 8/24 8/27  7/9  7/16  7/20   CP 10' 10' np defered 10'  10' 10' declined  10'  defered  10'                                               Assessment: Tolerated treatment well  Patient exhibited good technique with therapeutic exercises  Patient demonstrates good understanding of current POC  Progressed as charted without difficulty  Scheduled for RE NV  Plan: Continue per plan of care

## 2018-09-17 ENCOUNTER — APPOINTMENT (OUTPATIENT)
Dept: PHYSICAL THERAPY | Facility: CLINIC | Age: 48
End: 2018-09-17
Payer: COMMERCIAL

## 2018-09-18 ENCOUNTER — EVALUATION (OUTPATIENT)
Dept: PHYSICAL THERAPY | Facility: CLINIC | Age: 48
End: 2018-09-18
Payer: COMMERCIAL

## 2018-09-18 DIAGNOSIS — M22.2X2 PATELLOFEMORAL PAIN SYNDROME OF LEFT KNEE: ICD-10-CM

## 2018-09-18 DIAGNOSIS — S86.002D INJURY OF LEFT ACHILLES TENDON, SUBSEQUENT ENCOUNTER: Primary | ICD-10-CM

## 2018-09-18 DIAGNOSIS — Z98.890 STATUS POST ACHILLES TENDON REPAIR: ICD-10-CM

## 2018-09-18 DIAGNOSIS — M22.2X2 PATELLOFEMORAL DISORDER OF LEFT KNEE: ICD-10-CM

## 2018-09-18 PROCEDURE — 97110 THERAPEUTIC EXERCISES: CPT | Performed by: PHYSICAL THERAPIST

## 2018-09-18 PROCEDURE — G8991 OTHER PT/OT GOAL STATUS: HCPCS | Performed by: PHYSICAL THERAPIST

## 2018-09-18 PROCEDURE — G8992 OTHER PT/OT  D/C STATUS: HCPCS | Performed by: PHYSICAL THERAPIST

## 2018-09-18 PROCEDURE — 97140 MANUAL THERAPY 1/> REGIONS: CPT | Performed by: PHYSICAL THERAPIST

## 2018-09-18 PROCEDURE — 97112 NEUROMUSCULAR REEDUCATION: CPT | Performed by: PHYSICAL THERAPIST

## 2018-09-18 NOTE — PROGRESS NOTES
PT Re-Evaluation & Discharge    Today's date: 2018  Patient name: Leonor Rivas  : 1970  MRN: [id number]  Referring provider: Esther Bianchi MD  Dx:   Encounter Diagnosis     ICD-10-CM    1  Injury of left Achilles tendon, subsequent encounter S86 [id number]    2  Status post Achilles tendon repair Z98 890    3  Patellofemoral disorder of left knee M22 2X2    4  Patellofemoral pain syndrome of left knee M22 2X2                   Assessment  Impairments: abnormal gait, abnormal or restricted ROM, impaired balance, impaired physical strength, pain with function and weight-bearing intolerance    Assessment details: Leonor Rivas has attended 39  visits since initiating skilled PT and has demonstrated overall improvement in mobility, strength, and function, with a reduction in pain  Currently, they have made steady progress towards their goals, but continue to remain limited with ankle PF strength  At this time, he has made steady progress towards his goals and is independent in a HEP  It is recommended that he be discharged from skilled care and transitioned to an independent program  Thank you for this pleasant referral!   Understanding of Dx/Px/POC: good   Prognosis: good    Goals  ST-6 weeks  1  Patient to be independent with HEP  -Met  2  Decrease pain at least 2 subjective levels  Met     LT-12 weeks  1    Patient to voice comfort with self management of condition -Met  2   75% or > decreased pain -Partially Met  3   75% or > decreased functional deficits  -Met  4  Normalize AROM of all deficit planes  -Met  5  Normalize strength -Partially Met  6  Functional Status Score: 55 -Partially Met  7  Patient to voice understanding of activities/positions to avoid  - Met  8    Normalize Gait- Met    Plan  Patient would benefit from: skilled PT  Referral necessary: No  Planned modality interventions: cryotherapy and TENS  Planned therapy interventions: IADL retraining, joint mobilization, manual therapy, motor coordination training, neuromuscular re-education, patient education, self care, strengthening, stretching, therapeutic activities, therapeutic exercise, home exercise program, flexibility, ADL training, balance, body mechanics training, balance/weight bearing training and gait training  Treatment plan discussed with: patient  Plan details: D/c from skilled care        Subjective Evaluation    History of Present Illness  Date of onset: 3/29/2018  Date of surgery: 2018  Mechanism of injury: Pt notes that overall he has gotten much better  He is still unable to perform a SL heel raise  He also notes that he has pain only when he does prolonged walking  He has not been running because running has been bothering his knee     Quality of life: good    Pain  Current pain ratin  At best pain ratin  At worst pain ratin  Location: L Knee      Diagnostic Tests  MRI studies: abnormal        Objective     Active Range of Motion   Left Knee   Flexion: WFL  Extension: 5 degrees     Right Ankle/Foot   Dorsiflexion (ke): 7 degrees   Dorsiflexion (kf): 15 degrees   Plantar flexion: 50 degrees   Inversion: 48 degrees   Eversion: 17 degrees   Great toe extension: 70 degrees     Passive Range of Motion   Left Knee   Flexion: WFL  Extension: 0 degrees WFL    Right Ankle/Foot    Dorsiflexion (ke): WFL  Dorsiflexion (kf): WFL   Plantar flexion: WFL  Inversion: WFL  Eversion: WFL  Great toe extension: WFL    Strength/Myotome Testing     Left Knee   Flexion: 3+  Extension: 3+    Right Ankle/Foot   Dorsiflexion: 5  Plantar flexion: 4  Inversion: 5  Eversion: 5  Great toe extension: 5    Tests     Additional Tests Details  (+) TTP Medial Joint Line, M/L Patellar Grooves  SLR WFL  (+) Patellar Compression  No pain with DL Squat  SLS: WNL  DL Squat: 85 with UE support  Difficulty with HR- but able to complete a heel raise through 1/2 the available ROM 10 reps       Objective: See treatment diary below  Manual   8/31 9/10 9/14 9/17 8/6 8/13 8/17 8/20 8/24   PROM  LK 10' 10' 8' 8' 5' 5' 5[ LK    Post talocrural MWM Gastroc Str nv     2'  2' 5- AR   5'- AR  3' WA                                                                  Exercise Diary  8/20 8/24 8/27 8/31 9/10 9/14 9/17 8/6 8/13 8/17   SLR np            Standing wobble board SL 20x ap/ml       SL 20x AP ML SL 20 x AP ML SL 20 x AP ML   Seated wobble board SL 20x cw/ccw            Standing hip 3 way 2# 20x       20x     SLS 5"x20  held     Foam 10"x10 Disc :10x10    Walking laps- 25% WB NP             Standing HR/TR SL 2x10 SL  2x10 SL x 20 SL  10x 20x Both legs 20x  Both  legs 20x more SL 20x more SL SL 20x SL  20x   Leg press HR  90# 50x 90#  60x 90-# x 60   85#  3x25 90# 3x20 90#  4x10 held 90# x 20 90# x 50   squats  20x       held     TM np 4mph    8' 8 min  10 min  jog held held  held 2 min- jog 3 min jog   TB pulls 4way w/ SLS NP  NV          SLS cone taps 5 laps no foam  NV  5 laps foam 5 laps  foam 5 laps  foam 5 laps foam 5 laps foam   5 laps  foam   gastroc stretch 30"x5 30"x5 :30x5 30"x5 30"x5 30"x5 30"x5 held :30x5 30"x5   Total gym L 16   Lvl 19 x 20  lvl 16 x 20   L20  3x20 L 20 3x20 held lvl 20 1 5 min lvl 22  2 min  SL   Jogging lap        held     Foam  Ball throws  trampoline 4x5 SL 30x  SL SL 2 x 30 SL 2x30 SL  2x30 SL  2x30 SL 2x30   4x5  SL   biodex  LOS  5'  lvl 9 LOS/maze lvl LOS/maxe lvl 10 Foam - DL LOS/maze x 2 ea lvl10 Foam-  DL LOS/maze  x2 ea    L10 Foam DL LOS/MAze L10 2x ea                                                                Modalities  7/27 7/30 8/3 8/6 8/17 8/24 8/27  7/9  7/16  7/20   CP 10' 10' np defered 10'  10' 10' declined  10'  defered  10'

## 2018-09-20 ENCOUNTER — HOSPITAL ENCOUNTER (OUTPATIENT)
Dept: RADIOLOGY | Facility: HOSPITAL | Age: 48
Discharge: HOME | End: 2018-09-20
Attending: ORTHOPAEDIC SURGERY
Payer: COMMERCIAL

## 2018-09-20 ENCOUNTER — OFFICE VISIT (OUTPATIENT)
Dept: OBGYN CLINIC | Facility: HOSPITAL | Age: 48
End: 2018-09-20
Payer: COMMERCIAL

## 2018-09-20 VITALS
SYSTOLIC BLOOD PRESSURE: 172 MMHG | DIASTOLIC BLOOD PRESSURE: 104 MMHG | HEART RATE: 77 BPM | HEIGHT: 67 IN | BODY MASS INDEX: 28.72 KG/M2 | WEIGHT: 183 LBS

## 2018-09-20 DIAGNOSIS — M25.562 LEFT KNEE PAIN, UNSPECIFIED CHRONICITY: Primary | ICD-10-CM

## 2018-09-20 DIAGNOSIS — M25.562 LEFT KNEE PAIN, UNSPECIFIED CHRONICITY: ICD-10-CM

## 2018-09-20 PROCEDURE — 99214 OFFICE O/P EST MOD 30 MIN: CPT | Performed by: ORTHOPAEDIC SURGERY

## 2018-09-20 PROCEDURE — 73562 X-RAY EXAM OF KNEE 3: CPT

## 2018-09-20 NOTE — PROGRESS NOTES
Assessment:  1  Left knee pain, unspecified chronicity  XR knee 3 vw left non injury    Injection procedure prior authorization       Plan:  Discussed treatment options with patient for the left knee  Continue HEP for left achilles tendon  Exercise regimen explained to the patient for left knee  Hyaluronic acid injections ordered (authorization needed)  I will see patient back in office for administration of hyaluronic injection of the left knee  To do next visit:  Return in about 2 weeks (around 10/4/2018) for after hyaluronic acid injections are approved         Scribe Attestation    I,:   Michael Villarreal am acting as a scribe while in the presence of the attending physician :        I,:   Dylan Cage MD personally performed the services described in this documentation    as scribed in my presence :              Subjective:   Josue Pulido is a 53 y o  male who presents s/p Left achilles tendon repair 4/2/18  He states he only has discomfort of the left achilles when he is on his feet with physical activity  He states his physical therapy ended and he does HEP, he does workout at a gym  He complains of left knee pain  He states he had left knee pain before his left achilles tendon rupture  He did have sessions of physical therapy for his left knee        Review of systems negative unless otherwise specified in HPI    Past Medical History:   Diagnosis Date    Chronic back pain     Fibromyalgia     Hypertension     RA [facility]rhe[facility]oid arthritis) ([facility] [address] )        Past Surgical History:   Procedure Laterality Date    CARPAL TUNNEL RELEASE Left     LATERAL EPICONDYLE RELEASE Right 2/25/2016    Procedure: RELEASE EPICONDYLAR ELBOW, LATERAL;  Surgeon: Sharad Gan MD;  [facility];  Service:     [place] REPAIR ACHILLES TENDON,PRIMARY Left 4/2/2018    Procedure: REPAIR TENDON ACHILLES;  Surgeon: Dylan Cage MD;  [facility];  Service: Orthopedics    ROTATOR CUFF REPAIR Left     ULNAR TUNNEL RELEASE Left     x2       Family History   Problem Relation Age of Onset    Hypertension Mother     Diabetes Mother        Social History     Occupational History    Not on file  Social History Main Topics    Smoking status: Never Smoker    Smokeless tobacco: Never Used    Alcohol use No    Drug use: No    Sexual activity: Not on file         Current Outpatient Prescriptions:     amLODIPine (NORVASC) 10 mg tablet, Take 10 mg by mouth every evening  , Disp: , Rfl:     Butalbital-APAP-Caffeine (FIORICET PO), Take 1 tablet by mouth daily as needed Unknown dose  , Disp: , Rfl:     Losartan Potassium (COZAAR PO), Take 100 mg by mouth daily  , Disp: , Rfl:     methocarbamol (ROBAXIN) 750 mg tablet, Take 750 mg by mouth 4 (four) times a day, Disp: , Rfl:     metoprolol succinate (TOPROL-XL) 50 mg 24 hr tablet, "I take one a day bedtime", Disp: , Rfl:     apixaban (ELIQUIS) 5 mg, Take 2 tablets (10 mg total) by mouth 2 (two) times a day for 7 days, Disp: 13 tablet, Rfl: 0    Allergies   Allergen Reactions    Lisinopril      Other reaction(s): Other (See Comments)  Cough and rash         Reglan [Metoclopramide] Other (See Comments)     Restless; akisthesia              Vitals:    09/20/18 1343   BP: (!) 172/104   Pulse: 77       Objective:          Physical Exam                    Left Ankle Exam   Swelling: none    Tenderness   The patient is experiencing no tenderness  Range of Motion   The patient has normal left ankle ROM  Muscle Strength   The patient has normal left ankle strength  Other   Erythema: absent  Scars: present  Sensation: normal  Pulse: present    Comments:  Incision site is CDI and well healed  There is palpable scar tissue present along the left achilles tendon  He can do tip to raises of the left leg  Mild atrophy of the left calf muscle compared to right calf muscle        Left Knee Exam     Tenderness   The patient is experiencing tenderness in the medial joint line     Range of Motion   Extension: 0   Flexion: 130 (pain and crepitus present with rom of the left knee)     Muscle Strength     The patient has normal left knee strength  Tests   Jadiel:  Medial - negative Lateral - negative  Lachman:  Anterior - negative    Posterior - negative  Varus: negative  Valgus: negative  Patellar Apprehension: negative    Other   Erythema: absent  Sensation: normal  Pulse: present  Swelling: none            Diagnostics, reviewed and taken today if performed as documented:    None performed    Xray Left knee: demonstrates moderate degenerative changes present medially  No dislocation or fracture present  Procedures, if performed today:  Procedures    None performed     Portions of the record may have been created with voice recognition software   Occasional wrong word or "sound a like" substitutions may have occurred due to the inherent limitations of voice recognition software   Read the chart carefully and recognize, using context, where substitutions have occurred

## 2018-09-21 ENCOUNTER — APPOINTMENT (OUTPATIENT)
Dept: PHYSICAL THERAPY | Facility: CLINIC | Age: 48
End: 2018-09-21
Payer: COMMERCIAL

## 2018-09-24 ENCOUNTER — APPOINTMENT (OUTPATIENT)
Dept: PHYSICAL THERAPY | Facility: CLINIC | Age: 48
End: 2018-09-24
Payer: COMMERCIAL

## 2018-09-28 ENCOUNTER — APPOINTMENT (OUTPATIENT)
Dept: PHYSICAL THERAPY | Facility: CLINIC | Age: 48
End: 2018-09-28
Payer: COMMERCIAL

## 2018-10-04 ENCOUNTER — OFFICE VISIT (OUTPATIENT)
Dept: OBGYN CLINIC | Facility: HOSPITAL | Age: 48
End: 2018-10-04

## 2018-10-04 VITALS
WEIGHT: 181 LBS | SYSTOLIC BLOOD PRESSURE: 155 MMHG | HEIGHT: 67 IN | DIASTOLIC BLOOD PRESSURE: 100 MMHG | BODY MASS INDEX: 28.41 KG/M2 | HEART RATE: 64 BPM

## 2018-10-04 DIAGNOSIS — S86.002D INJURY OF LEFT ACHILLES TENDON, SUBSEQUENT ENCOUNTER: Primary | ICD-10-CM

## 2018-12-07 ENCOUNTER — OFFICE VISIT (OUTPATIENT)
Dept: OBGYN CLINIC | Facility: HOSPITAL | Age: 48
End: 2018-12-07
Payer: COMMERCIAL

## 2018-12-07 ENCOUNTER — PREP FOR PROCEDURE (OUTPATIENT)
Dept: OBGYN CLINIC | Facility: HOSPITAL | Age: 48
End: 2018-12-07

## 2018-12-07 VITALS
BODY MASS INDEX: 29.1 KG/M2 | HEART RATE: 75 BPM | SYSTOLIC BLOOD PRESSURE: 169 MMHG | WEIGHT: 185.4 LBS | DIASTOLIC BLOOD PRESSURE: 109 MMHG | HEIGHT: 67 IN

## 2018-12-07 DIAGNOSIS — M77.12 LATERAL EPICONDYLITIS OF LEFT ELBOW: Primary | ICD-10-CM

## 2018-12-07 PROCEDURE — 99214 OFFICE O/P EST MOD 30 MIN: CPT | Performed by: ORTHOPAEDIC SURGERY

## 2018-12-07 RX ORDER — LIDOCAINE HYDROCHLORIDE AND EPINEPHRINE 10; 10 MG/ML; UG/ML
20 INJECTION, SOLUTION INFILTRATION; PERINEURAL ONCE
Status: CANCELLED | OUTPATIENT
Start: 2018-12-07 | End: 2018-12-07

## 2018-12-07 RX ORDER — CEFAZOLIN SODIUM 2 G/50ML
2000 SOLUTION INTRAVENOUS ONCE
Status: CANCELLED | OUTPATIENT
Start: 2018-12-07 | End: 2018-12-07

## 2018-12-07 NOTE — PROGRESS NOTES
ASSESSMENT/PLAN:    Assessment:   Lateral Epicondylitis  left    Plan:   Lateral Epicondylar release  left    Follow Up: After Surgery    To Do Next Visit:    and Sutures out    General Discussions:       Operative Discussions:     Lateral Epicondylitis Release: The anatomy and physiology of lateral epicondylitis was discussed with the patient today  Typically, a traumatic injury or repetitive use may cause a partial or complete tear of the extensor carpi radialis brevis muscle  This creates pain over the lateral epicondyle  This pain typically is made worse with palm down lifting activities as well as anything that involves strength and stability of the wrist   The pain may radiate from the wrist up to the elbow  At times, the shoulder may be weak as well which can predispose or cause continuation of the problem  Conservative treatment usually cures a majority of patients; however, this may take up to 6-9 months  Conservative treatment options typically include activity modification, therapy for strengthening of the shoulder and elbow, nocturnal wrist support splints, tennis elbow straps, and possible corticosteroid injections  Corticosteroid injections do not change the natural history of this process, may decrease the pain temporarily, and may increase the risk of recurrence  Surgery is required in fewer than 10% of patients  The patient has elected to under release of the lateral epicondylitis  The risks and benefits of the procedure were explained to the patient, which include, but are not limited to: Bleeding, infection, recurrence, pain, scar, damage to tendons, damage to nerves, and damage to blood vessels, failure to give desired results and complications related to anesthesia   These risks, along with alternative conservative treatment options, and postoperative protocols were voiced back and understood by the patient   All questions were answered to the patient's satisfaction   The patient agrees to comply with a standard postoperative protocol, and is willing to proceed   Education was provided via written and auditory forms   There were no barriers to learning  Written handouts regarding wound care, incision and scar care, and general preoperative information was provided to the patient   Prior to surgery, the patient may be requested to stop all anti-inflammatory medications   Prophylactic aspirin, Plavix, and Coumadin may be allowed to be continued   Medications including vitamin E , ginkgo, and fish oil are requested to be stopped approximately one week prior to surgery   Hypertensive medications and beta blockers, if taken, should be continued  _____________________________________________________  CHIEF COMPLAINT:  Chief Complaint   Patient presents with    Left Elbow - Follow-up         SUBJECTIVE:  Christian York is a 53y o  year old male who presents for follow up regarding Lateral Epicondylitis  left  Since last visit, Christian York has tried therapy and activity modification without relief  Today there is Pain  Severe  Intermittant  Sharp and Aching to the left elbow  Patient notices stiffness into his left elbow and states that he has to physically try to extend his elbow  Patient denies numbness and tingling  Patient describes difficulties lifting items that are too heavy, doing housework and using hardware tools  Patient has a history of a right lateral epicondyle release with relief of his symptoms     Radiation: None  Associated symptoms: No Complaints    PAST MEDICAL HISTORY:  Past Medical History:   Diagnosis Date    Chronic back pain     Fibromyalgia     Hypertension     RA (rheumatoid arthritis) ([facility] [address] )        PAST SURGICAL HISTORY:  Past Surgical History:   Procedure Laterality Date    CARPAL TUNNEL RELEASE Left     LATERAL EPICONDYLE RELEASE Right 2/25/2016    Procedure: RELEASE EPICONDYLAR ELBOW, LATERAL;  Surgeon: Virginia Vela MD;  [facility];  Service:  [place] REPAIR ACHILLES TENDON,PRIMARY Left 4/2/2018    Procedure: REPAIR TENDON ACHILLES;  Surgeon: Sarah Wills MD;  [facility];  Service: Orthopedics    ROTATOR CUFF REPAIR Left     ULNAR TUNNEL RELEASE Left     x2       FAMILY HISTORY:  Family History   Problem Relation Age of Onset    Hypertension Mother     Diabetes Mother        SOCIAL HISTORY:  Social History   Substance Use Topics    Smoking status: Never Smoker    Smokeless tobacco: Never Used    Alcohol use No       MEDICATIONS:    Current Outpatient Prescriptions:     amLODIPine (NORVASC) 10 mg tablet, Take 10 mg by mouth every evening  , Disp: , Rfl:     Butalbital-APAP-Caffeine (FIORICET PO), Take 1 tablet by mouth daily as needed Unknown dose  , Disp: , Rfl:     Losartan Potassium (COZAAR PO), Take 100 mg by mouth daily  , Disp: , Rfl:     methocarbamol (ROBAXIN) 750 mg tablet, Take 750 mg by mouth 4 (four) times a day, Disp: , Rfl:     metoprolol succinate (TOPROL-XL) 50 mg 24 hr tablet, "I take one a day bedtime", Disp: , Rfl:     apixaban (ELIQUIS) 5 mg, Take 2 tablets (10 mg total) by mouth 2 (two) times a day for 7 days, Disp: 13 tablet, Rfl: 0    ALLERGIES:  Allergies   Allergen Reactions    Lisinopril      Other reaction(s): Other (See Comments)  Cough and rash         Reglan [Metoclopramide] Other (See Comments)     Restless; akisthesia         REVIEW OF SYSTEMS:  Pertinent items are noted in HPI      LABS:  HgA1c: No results found for: HGBA1C  BMP:   Lab Results   Component Value Date    GLUCOSE 91 10/27/2015    CALCIUM 9 0 02/09/2018     10/27/2015    K 3 5 02/09/2018    CO2 25 02/09/2018     02/09/2018    BUN 10 02/09/2018    CREATININE 1 02 02/09/2018           _____________________________________________________  PHYSICAL EXAMINATION:  General: well developed and well nourished, alert, oriented times 3 and appears comfortable  Psychiatric: Normal  HEENT: Trachea Midline, No torticollis  Cardiovascular: No discernable arrhythmia  Pulmonary: No wheezing or stridor  Skin: No masses, erthema, lacerations, fluctation, ulcerations  Neurovascular: Sensation Intact to the Median, Ulnar, Radial Nerve, Motor Intact to the Median, Ulnar, Radial Nerve and Pulses Intact    MUSCULOSKELETAL EXAMINATION:  LEFT SIDE:  Elbow:  Negative instability varus, Negative instability valgus, Negative instability to posteromedial rotation, Negative instability to posterolateral rotation, Negative Pivot shift test, Negative Hangar test, No bursitis and Tenderness ECRB and pain with passive wrist flexion and resisted wrist extension    _____________________________________________________  STUDIES REVIEWED:  No Studies to review      PROCEDURES PERFORMED:  Procedures  No Procedures performed today   Scribe Attestation    I,:   Anderson Wong am acting as a scribe while in the presence of the attending physician :        I,:   Jessica Fonseca MD personally performed the services described in this documentation    as scribed in my presence :

## 2018-12-07 NOTE — H&P
ASSESSMENT/PLAN:    Assessment:   Lateral Epicondylitis  left    Plan:   Lateral Epicondylar release  left    Follow Up: After Surgery    To Do Next Visit:    and Sutures out    General Discussions:       Operative Discussions:     Lateral Epicondylitis Release: The anatomy and physiology of lateral epicondylitis was discussed with the patient today  Typically, a traumatic injury or repetitive use may cause a partial or complete tear of the extensor carpi radialis brevis muscle  This creates pain over the lateral epicondyle  This pain typically is made worse with palm down lifting activities as well as anything that involves strength and stability of the wrist   The pain may radiate from the wrist up to the elbow  At times, the shoulder may be weak as well which can predispose or cause continuation of the problem  Conservative treatment usually cures a majority of patients; however, this may take up to 6-9 months  Conservative treatment options typically include activity modification, therapy for strengthening of the shoulder and elbow, nocturnal wrist support splints, tennis elbow straps, and possible corticosteroid injections  Corticosteroid injections do not change the natural history of this process, may decrease the pain temporarily, and may increase the risk of recurrence  Surgery is required in fewer than 10% of patients  The patient has elected to under release of the lateral epicondylitis  The risks and benefits of the procedure were explained to the patient, which include, but are not limited to: Bleeding, infection, recurrence, pain, scar, damage to tendons, damage to nerves, and damage to blood vessels, failure to give desired results and complications related to anesthesia   These risks, along with alternative conservative treatment options, and postoperative protocols were voiced back and understood by the patient   All questions were answered to the patient's satisfaction   The patient agrees to comply with a standard postoperative protocol, and is willing to proceed   Education was provided via written and auditory forms   There were no barriers to learning  Written handouts regarding wound care, incision and scar care, and general preoperative information was provided to the patient   Prior to surgery, the patient may be requested to stop all anti-inflammatory medications   Prophylactic aspirin, Plavix, and Coumadin may be allowed to be continued   Medications including vitamin E , ginkgo, and fish oil are requested to be stopped approximately one week prior to surgery   Hypertensive medications and beta blockers, if taken, should be continued  _____________________________________________________  CHIEF COMPLAINT:  Chief Complaint   Patient presents with    Left Elbow - Follow-up         SUBJECTIVE:  Tresa Levin is a 53y o  year old male who presents for follow up regarding Lateral Epicondylitis  left  Since last visit, Tresa Levin has tried therapy and activity modification without relief  Today there is Pain  Severe  Intermittant  Sharp and Aching to the left elbow  Patient notices stiffness into his left elbow and states that he has to physically try to extend his elbow  Patient denies numbness and tingling  Patient describes difficulties lifting items that are too heavy, doing housework and using hardware tools  Patient has a history of a right lateral epicondyle release with relief of his symptoms     Radiation: None  Associated symptoms: No Complaints    PAST MEDICAL HISTORY:  Past Medical History:   Diagnosis Date    Chronic back pain     Fibromyalgia     Hypertension     RA [facility]rheumatoid arthritis) ([facility] [address] )        PAST SURGICAL HISTORY:  Past Surgical History:   Procedure Laterality Date    CARPAL TUNNEL RELEASE Left     LATERAL EPICONDYLE RELEASE Right 2/25/2016    Procedure: RELEASE EPICONDYLAR ELBOW, LATERAL;  Surgeon: Trisha Reyes MD;  [facility];  Service:  [place] REPAIR ACHILLES TENDON,PRIMARY Left 4/2/2018    Procedure: REPAIR TENDON ACHILLES;  Surgeon: Genna Esparza MD;  [facility];  Service: Orthopedics    ROTATOR CUFF REPAIR Left     ULNAR TUNNEL RELEASE Left     x2       FAMILY HISTORY:  Family History   Problem Relation Age of Onset    Hypertension Mother     Diabetes Mother        SOCIAL HISTORY:  Social History   Substance Use Topics    Smoking status: Never Smoker    Smokeless tobacco: Never Used    Alcohol use No       MEDICATIONS:    Current Outpatient Prescriptions:     amLODIPine (NORVASC) 10 mg tablet, Take 10 mg by mouth every evening  , Disp: , Rfl:     Butalbital-APAP-Caffeine (FIORICET PO), Take 1 tablet by mouth daily as needed Unknown dose  , Disp: , Rfl:     Losartan Potassium (COZAAR PO), Take 100 mg by mouth daily  , Disp: , Rfl:     methocarbamol (ROBAXIN) 750 mg tablet, Take 750 mg by mouth 4 (four) times a day, Disp: , Rfl:     metoprolol succinate (TOPROL-XL) 50 mg 24 hr tablet, "I take one a day bedtime", Disp: , Rfl:     apixaban (ELIQUIS) 5 mg, Take 2 tablets (10 mg total) by mouth 2 (two) times a day for 7 days, Disp: 13 tablet, Rfl: 0    ALLERGIES:  Allergies   Allergen Reactions    Lisinopril      Other reaction(s): Other (See Comments)  Cough and rash         Reglan [Metoclopramide] Other (See Comments)     Restless; akisthesia         REVIEW OF SYSTEMS:  Pertinent items are noted in HPI      LABS:  HgA1c: No results found for: HGBA1C  BMP:   Lab Results   Component Value Date    GLUCOSE 91 10/27/2015    CALCIUM 9 0 02/09/2018     10/27/2015    K 3 5 02/09/2018    CO2 25 02/09/2018     02/09/2018    BUN 10 02/09/2018    CREATININE 1 02 02/09/2018           _____________________________________________________  PHYSICAL EXAMINATION:  General: well developed and well nourished, alert, oriented times 3 and appears comfortable  Psychiatric: Normal  HEENT: Trachea Midline, No torticollis  Cardiovascular: No discernable arrhythmia  Pulmonary: No wheezing or stridor  Skin: No masses, erthema, lacerations, fluctation, ulcerations  Neurovascular: Sensation Intact to the Median, Ulnar, Radial Nerve, Motor Intact to the Median, Ulnar, Radial Nerve and Pulses Intact    MUSCULOSKELETAL EXAMINATION:  LEFT SIDE:  Elbow:  Negative instability varus, Negative instability valgus, Negative instability to posteromedial rotation, Negative instability to posterolateral rotation, Negative Pivot shift test, Negative Hangar test, No bursitis and Tenderness ECRB and pain with passive wrist flexion and resisted wrist extension    _____________________________________________________  STUDIES REVIEWED:  No Studies to review      PROCEDURES PERFORMED:  Procedures  No Procedures performed today   Scribe Attestation    I,:   Dioni Denton am acting as a scribe while in the presence of the attending physician :        I,:   Bo Barker MD personally performed the services described in this documentation    as scribed in my presence :

## 2018-12-18 ENCOUNTER — OFFICE VISIT (OUTPATIENT)
Dept: OBGYN CLINIC | Facility: HOSPITAL | Age: 48
End: 2018-12-18
Payer: COMMERCIAL

## 2018-12-18 VITALS
HEIGHT: 67 IN | DIASTOLIC BLOOD PRESSURE: 113 MMHG | BODY MASS INDEX: 28.25 KG/M2 | HEART RATE: 59 BPM | SYSTOLIC BLOOD PRESSURE: 165 MMHG | WEIGHT: 180 LBS

## 2018-12-18 DIAGNOSIS — Z48.89 AFTERCARE FOLLOWING SURGERY: ICD-10-CM

## 2018-12-18 DIAGNOSIS — M17.12 PRIMARY OSTEOARTHRITIS OF LEFT KNEE: ICD-10-CM

## 2018-12-18 DIAGNOSIS — Z98.890 STATUS POST ACHILLES TENDON REPAIR: ICD-10-CM

## 2018-12-18 DIAGNOSIS — M22.2X2 PATELLOFEMORAL DISORDER OF LEFT KNEE: Primary | ICD-10-CM

## 2018-12-18 PROCEDURE — 99213 OFFICE O/P EST LOW 20 MIN: CPT | Performed by: ORTHOPAEDIC SURGERY

## 2018-12-18 PROCEDURE — 20610 DRAIN/INJ JOINT/BURSA W/O US: CPT | Performed by: ORTHOPAEDIC SURGERY

## 2018-12-18 RX ORDER — TRAMADOL HYDROCHLORIDE 50 MG/1
TABLET ORAL
Refills: 0 | COMMUNITY
Start: 2018-12-14 | End: 2019-02-12 | Stop reason: HOSPADM

## 2018-12-18 RX ORDER — LEVOTHYROXINE SODIUM 0.03 MG/1
25 TABLET ORAL DAILY
Refills: 0 | COMMUNITY
Start: 2018-12-10

## 2018-12-18 RX ORDER — MELOXICAM 15 MG/1
15 TABLET ORAL
COMMUNITY
Start: 2018-12-14

## 2018-12-18 RX ADMIN — BETAMETHASONE SODIUM PHOSPHATE AND BETAMETHASONE ACETATE 12 MG: 3; 3 INJECTION, SUSPENSION INTRA-ARTICULAR; INTRALESIONAL; INTRAMUSCULAR; SOFT TISSUE at 18:03

## 2018-12-18 RX ADMIN — BUPIVACAINE HYDROCHLORIDE 2 ML: 2.5 INJECTION, SOLUTION INFILTRATION; PERINEURAL at 18:03

## 2018-12-18 RX ADMIN — LIDOCAINE HYDROCHLORIDE 2 ML: 10 INJECTION, SOLUTION EPIDURAL; INFILTRATION; INTRACAUDAL; PERINEURAL at 18:03

## 2018-12-18 NOTE — PROGRESS NOTES
Assessment/Plan: left knee osteoarthritis, 7 months s/p left achilles tendon repair      - CSI performed into the left knee today, without complication   - CSI and hyaluronic acid injection must be 4 weeks apart  - Avoid lifting more then 70 lbs with LLE while at the gym   - Follow up for hyaluronic acid injection when it is approved       No problem-specific Assessment & Plan notes found for this encounter  Problem List Items Addressed This Visit     Patellofemoral disorder of left knee - Primary      Other Visit Diagnoses     Aftercare following surgery        Status post Achilles tendon repair        Primary osteoarthritis of left knee                Subjective:      Patient ID: Stefano Singletary is a 53 y o  male  HPI 53 y o  male presents to the office today for follow up for left knee pain and 7 months s/p left achilles tendon repair  Gabriella Tijerina has transition to a HEP for his left achilles tendon  He denies any pain today regarding his left achilles tendon repair  Hylaluronic acid injection prior auth was started at his last appointment on 09/20/18  The patient states that the injection was approved, but he states he is waiting for the medication to be shipped to the office  The patient is here today to discuss possible CSI into the left knee, until we receive the hylaluronic acid injection  The patient states that he is still experiencing medial and anterior left knee pain  He denies associated numbness and tingling  The following portions of the patient's history were reviewed and updated as appropriate: allergies, current medications, past family history, past medical history, past social history, past surgical history and problem list     Review of Systems   Constitutional: Negative for chills, fever and unexpected weight change  HENT: Negative for hearing loss, nosebleeds and sore throat  Eyes: Negative for pain, redness and visual disturbance     Respiratory: Negative for cough, shortness of breath and wheezing  Cardiovascular: Negative for chest pain, palpitations and leg swelling  Gastrointestinal: Negative for abdominal pain, nausea and vomiting  Endocrine: Negative for polydipsia and polyuria  Genitourinary: Negative for difficulty urinating and hematuria  Musculoskeletal: Negative for arthralgias, joint swelling and myalgias  Skin: Negative for rash and wound  Neurological: Negative for dizziness, numbness and headaches  Psychiatric/Behavioral: Negative for decreased concentration, dysphoric mood and suicidal ideas  The patient is not nervous/anxious  Objective:      BP (!) 165/113   Pulse 59   Ht 5' 7" (1 702 m)   Wt 81 6 kg (180 lb)   BMI 28 19 kg/m²          Physical Exam   Constitutional: He is oriented to person, place, and time  He appears well-developed and well-nourished  Eyes: Pupils are equal, round, and reactive to light  Conjunctivae are normal    Pulmonary/Chest: Effort normal and breath sounds normal    Neurological: He is alert and oriented to person, place, and time  Skin: Skin is warm and dry  Psychiatric: He has a normal mood and affect   His behavior is normal        Left knee:  Ambulates without assistance   TTP medial joint line   ROM 0-130  Crepitus noted with ROM  Normal strength     Left ankle:  Ambulates without assistance   Non TTP achilles tendon   Well healed incision   Normal left ankle ROM       Large joint arthrocentesis  Date/Time: 12/18/2018 6:03 PM  Consent given by: patient  Site marked: site marked  Timeout: Immediately prior to procedure a time out was called to verify the correct patient, procedure, equipment, support staff and site/side marked as required   Supporting Documentation  Indications: pain   Procedure Details  Location: knee - L knee  Preparation: Patient was prepped and draped in the usual sterile fashion  Needle size: 22 G  Ultrasound guidance: no  Medications administered: 2 mL bupivacaine 0 25 %; 12 mg betamethasone acetate-betamethasone sodium phosphate 6 (3-3) mg/mL; 2 mL lidocaine (PF) 1 %    Patient tolerance: patient tolerated the procedure well with no immediate complications  Dressing:  Sterile dressing applied        Scribe Attestation    I,:   Ankush Muhammad am acting as a scribe while in the presence of the attending physician :        I,:   Esa Gallardo MD personally performed the services described in this documentation    as scribed in my presence :

## 2018-12-21 ENCOUNTER — TELEPHONE (OUTPATIENT)
Dept: PREADMISSION TESTING | Facility: HOSPITAL | Age: 48
End: 2018-12-21

## 2019-01-02 RX ORDER — LIDOCAINE HYDROCHLORIDE 10 MG/ML
2 INJECTION, SOLUTION EPIDURAL; INFILTRATION; INTRACAUDAL; PERINEURAL
Status: COMPLETED | OUTPATIENT
Start: 2018-12-18 | End: 2018-12-18

## 2019-01-02 RX ORDER — BETAMETHASONE SODIUM PHOSPHATE AND BETAMETHASONE ACETATE 3; 3 MG/ML; MG/ML
12 INJECTION, SUSPENSION INTRA-ARTICULAR; INTRALESIONAL; INTRAMUSCULAR; SOFT TISSUE
Status: COMPLETED | OUTPATIENT
Start: 2018-12-18 | End: 2018-12-18

## 2019-01-02 RX ORDER — BUPIVACAINE HYDROCHLORIDE 2.5 MG/ML
2 INJECTION, SOLUTION INFILTRATION; PERINEURAL
Status: COMPLETED | OUTPATIENT
Start: 2018-12-18 | End: 2018-12-18

## 2019-01-03 ENCOUNTER — OFFICE VISIT (OUTPATIENT)
Dept: OBGYN CLINIC | Facility: OTHER | Age: 49
End: 2019-01-03
Payer: COMMERCIAL

## 2019-01-03 ENCOUNTER — APPOINTMENT (OUTPATIENT)
Dept: RADIOLOGY | Facility: OTHER | Age: 49
End: 2019-01-03
Payer: COMMERCIAL

## 2019-01-03 VITALS
SYSTOLIC BLOOD PRESSURE: 171 MMHG | DIASTOLIC BLOOD PRESSURE: 115 MMHG | HEART RATE: 76 BPM | HEIGHT: 67 IN | BODY MASS INDEX: 28.19 KG/M2

## 2019-01-03 DIAGNOSIS — M75.41 IMPINGEMENT SYNDROME OF RIGHT SHOULDER: Primary | ICD-10-CM

## 2019-01-03 DIAGNOSIS — M25.511 CHRONIC RIGHT SHOULDER PAIN: ICD-10-CM

## 2019-01-03 DIAGNOSIS — G89.29 CHRONIC RIGHT SHOULDER PAIN: ICD-10-CM

## 2019-01-03 DIAGNOSIS — S46.811D INFRASPINATUS TENDON TEAR, RIGHT, SUBSEQUENT ENCOUNTER: ICD-10-CM

## 2019-01-03 PROBLEM — IMO0001 SUPRASPINATUS TENDON TEAR, RIGHT, SUBSEQUENT ENCOUNTER: Status: ACTIVE | Noted: 2019-01-03

## 2019-01-03 PROCEDURE — 73030 X-RAY EXAM OF SHOULDER: CPT

## 2019-01-03 PROCEDURE — 99213 OFFICE O/P EST LOW 20 MIN: CPT | Performed by: ORTHOPAEDIC SURGERY

## 2019-01-03 PROCEDURE — 20610 DRAIN/INJ JOINT/BURSA W/O US: CPT | Performed by: ORTHOPAEDIC SURGERY

## 2019-01-03 RX ORDER — BUPIVACAINE HYDROCHLORIDE 2.5 MG/ML
2 INJECTION, SOLUTION INFILTRATION; PERINEURAL
Status: COMPLETED | OUTPATIENT
Start: 2019-01-03 | End: 2019-01-03

## 2019-01-03 RX ORDER — BETAMETHASONE SODIUM PHOSPHATE AND BETAMETHASONE ACETATE 3; 3 MG/ML; MG/ML
6 INJECTION, SUSPENSION INTRA-ARTICULAR; INTRALESIONAL; INTRAMUSCULAR; SOFT TISSUE
Status: COMPLETED | OUTPATIENT
Start: 2019-01-03 | End: 2019-01-03

## 2019-01-03 RX ADMIN — BETAMETHASONE SODIUM PHOSPHATE AND BETAMETHASONE ACETATE 6 MG: 3; 3 INJECTION, SUSPENSION INTRA-ARTICULAR; INTRALESIONAL; INTRAMUSCULAR; SOFT TISSUE at 15:11

## 2019-01-03 RX ADMIN — BUPIVACAINE HYDROCHLORIDE 2 ML: 2.5 INJECTION, SOLUTION INFILTRATION; PERINEURAL at 15:11

## 2019-01-03 NOTE — PROGRESS NOTES
Assessment  Diagnoses and all orders for this visit:    Impingement syndrome of right shoulder    Discussion and Plan:    1  Subacromial steroid injection for pain relief  2  Continue with home exercises as described by his therapist  3  If symptoms persist, a MRI of the right shoulder would be indicated to evaluate for progression of his known partial infraspinatus tendon tear  Subjective:   Patient ID: Jean Carbajal is a 53 y o  male      Pat Lepanto presents with new onset of right shoulder pain shortly after doing push ups in December  He states it has been sore since that time period  Pain is localized to the lateral aspect of the right shoulder  Pain does not radiate  Pain is worse with overhead motion and working out  He has a known near full thickness infraspinatus tendon tear  He has responded well to physical therapy and injections in the past   His last office visit was nearly 4 years ago and he has been asymptomatic since that time  He has been doing his home exercise program 3-4 times a week through his normal workout regimen  He denies catching of the shoulder  He denies pain that interferes with sleep  He denies numbness or tingling  Vitals:    01/03/19 1436   BP: (!) 171/115   Pulse: 76   Patient states he takes blood pressure medication for his hypertension  He will discuss the high pressure with is PCP    The following portions of the patient's history were reviewed and updated as appropriate: allergies, current medications, past family history, past medical history, past social history, past surgical history and problem list     Review of Systems   Constitutional: Negative for chills and fever  HENT: Negative for hearing loss  Eyes: Negative for visual disturbance  Respiratory: Negative for shortness of breath  Cardiovascular: Negative for chest pain  Gastrointestinal: Negative for abdominal pain     Musculoskeletal:        As reviewed in the HPI   Skin: Negative for rash    Neurological:        As reviewed in the HPI   Psychiatric/Behavioral: Negative for agitation  Objective:  Right Shoulder Exam     Tenderness   None    Range of Motion   Normal right shoulder ROM    Muscle Strength   External Rotation: 4/5  Supraspinatus:     5/5    Tests   Impingement:   Positive  Pickens:          Positive  Drop Arm:        Negative    Comments:  Negative speeds  Negative Windsor's  Negative Empty Can        Physical Exam   Constitutional: He is oriented to person, place, and time  He appears well-developed and well-nourished  HENT:   Head: Normocephalic  Eyes: EOM are normal    Neck: Normal range of motion  Pulmonary/Chest: No respiratory distress  He has no wheezes  Neurological: He is alert and oriented to person, place, and time  Skin: Skin is warm and dry  Psychiatric: He has a normal mood and affect  His behavior is normal  Judgment and thought content normal      I have personally reviewed pertinent films in PACS and my interpretation is as follows      3 views right shoulder: no fx, dislocation, calcific tendinitis or glenohumeral osteoarthritis    Large joint injection  Date/Time: 1/3/2019 3:11 PM  Consent given by: patient  Timeout: Immediately prior to procedure a time out was called to verify the correct patient, procedure, equipment, support staff and site/side marked as required   Supporting Documentation  Indications: pain   Procedure Details  Location: shoulder - R subacromial bursa  Preparation: Patient was prepped and draped in the usual sterile fashion  Needle size: 22 G  Ultrasound guidance: no  Approach: lateral  Medications administered: 2 mL bupivacaine 0 25 %; 6 mg betamethasone acetate-betamethasone sodium phosphate 6 (3-3) mg/mL    Patient tolerance: patient tolerated the procedure well with no immediate complications  Dressing:  Sterile dressing applied

## 2019-01-03 NOTE — PATIENT INSTRUCTIONS

## 2019-01-04 ENCOUNTER — TELEPHONE (OUTPATIENT)
Dept: OBGYN CLINIC | Facility: OTHER | Age: 49
End: 2019-01-04

## 2019-01-04 NOTE — TELEPHONE ENCOUNTER
Can't call now  - can you reach out and see what he needs and see if you can answer his Qs  3-5 days for injection to work

## 2019-01-04 NOTE — TELEPHONE ENCOUNTER
Patient called requesting a call back to get some information about the injection that he got yesterday from [facility] please advise thanks                                      Call back# [phone]  [facility]

## 2019-01-07 ENCOUNTER — OFFICE VISIT (OUTPATIENT)
Dept: OBGYN CLINIC | Facility: CLINIC | Age: 49
End: 2019-01-07
Payer: COMMERCIAL

## 2019-01-07 VITALS
HEIGHT: 67 IN | SYSTOLIC BLOOD PRESSURE: 132 MMHG | WEIGHT: 185 LBS | BODY MASS INDEX: 29.03 KG/M2 | DIASTOLIC BLOOD PRESSURE: 84 MMHG

## 2019-01-07 DIAGNOSIS — M25.522 PAIN IN LEFT ELBOW: ICD-10-CM

## 2019-01-07 DIAGNOSIS — M77.12 LATERAL EPICONDYLITIS OF LEFT ELBOW: Primary | ICD-10-CM

## 2019-01-07 PROCEDURE — 99213 OFFICE O/P EST LOW 20 MIN: CPT | Performed by: ORTHOPAEDIC SURGERY

## 2019-01-07 NOTE — PROGRESS NOTES
ASSESSMENT/PLAN:    Assessment:   Left lateral epicondylitis   Left snapping medial triceps    Plan:   Patient encouraged that ulnar nerve subluxing on today's exam   He most likely irritated the triceps muscle  For this, encouraged to rest and just modify lifting activities to allow this to heal    Patient still would like to proceed with lateral epicondylar release as scheduled  Follow Up: After Surgery    _____________________________________________________  CHIEF COMPLAINT:  Chief Complaint   Patient presents with    Left Elbow - Follow-up         SUBJECTIVE:  Alejandra Wilson is a 53y o  year old male who presents for follow up regarding his left elbow  He is scheduled for lateral epicondylar release to be performed next month  States that he called to make this appointment after experiencing medial sided pain and pop sensation in the left elbow while doing pushups  He has had previous CuTR and ulnar nerve transposition and just wants to make sure he doesn't have anything happening with this side of the elbow  He denies n/t  Still is having discomfort      PAST MEDICAL HISTORY:  Past Medical History:   Diagnosis Date    Chronic back pain     Fibromyalgia     Hypertension     RA (rheumatoid arthritis) ([facility] [address] )        PAST SURGICAL HISTORY:  Past Surgical History:   Procedure Laterality Date    CARPAL TUNNEL RELEASE Left     LATERAL EPICONDYLE RELEASE Right 2/25/2016    Procedure: RELEASE EPICONDYLAR ELBOW, LATERAL;  Surgeon: Libia Bell MD;  [facility];  Service:     [place] REPAIR ACHILLES TENDON,PRIMARY Left 4/2/2018    Procedure: REPAIR TENDON ACHILLES;  Surgeon: Katina Manzano MD;  [facility];  Service: Orthopedics    ROTATOR CUFF REPAIR Left     ULNAR TUNNEL RELEASE Left     x2       FAMILY HISTORY:  Family History   Problem Relation Age of Onset    Hypertension Mother     Diabetes Mother        SOCIAL HISTORY:  Social History   Substance Use Topics    Smoking status: Never Smoker    Smokeless tobacco: Never Used    Alcohol use No       MEDICATIONS:    Current Outpatient Prescriptions:     amLODIPine (NORVASC) 10 mg tablet, Take 10 mg by mouth every evening  , Disp: , Rfl:     Butalbital-APAP-Caffeine (FIORICET PO), Take 1 tablet by mouth daily as needed Unknown dose  , Disp: , Rfl:     levothyroxine 25 mcg tablet, , Disp: , Rfl: 0    Losartan Potassium (COZAAR PO), Take 100 mg by mouth daily  , Disp: , Rfl:     meloxicam (MOBIC) 15 mg tablet, Take 15 mg by mouth, Disp: , Rfl:     methocarbamol (ROBAXIN) 750 mg tablet, Take 750 mg by mouth 4 (four) times a day, Disp: , Rfl:     metoprolol succinate (TOPROL-XL) 50 mg 24 hr tablet, "I take one a day bedtime", Disp: , Rfl:     traMADol (ULTRAM) 50 mg tablet, take 1 tablet (50 MG TOTAL) by mouth twice a day if needed for severe pain (PAIN SCORE 7-10), Disp: , Rfl: 0    ALLERGIES:  Allergies   Allergen Reactions    Lisinopril      Other reaction(s): Other (See Comments)  Cough and rash         Reglan [Metoclopramide] Other (See Comments)     Restless; akisthesia         REVIEW OF SYSTEMS:  Pertinent items are noted in HPI  A comprehensive review of systems was negative      LABS:  HgA1c: No results found for: HGBA1C  BMP:   Lab Results   Component Value Date    GLUCOSE 91 10/27/2015    CALCIUM 9 0 02/09/2018     10/27/2015    K 3 5 02/09/2018    CO2 25 02/09/2018     02/09/2018    BUN 10 02/09/2018    CREATININE 1 02 02/09/2018           _____________________________________________________  PHYSICAL EXAMINATION:  General: well developed and well nourished, alert, oriented times 3 and appears comfortable  Psychiatric: Normal  HEENT: Trachea Midline, No torticollis  Cardiovascular: No discernable arrhythmia  Pulmonary: No wheezing or stridor  Skin: No masses, erthema, lacerations, fluctation, ulcerations  Neurovascular: Sensation Intact to the Median, Ulnar, Radial Nerve, Motor Intact to the Median, Ulnar, Radial Nerve and Pulses Intact    MUSCULOSKELETAL EXAMINATION:  LEFT SIDE:  Elbow:  Very mild triceps subluxation of medial head  No ulnar nerve subluxation felt today  Negative Tinel's at cubital tunnel and negative compression testing  Intrinsics 5/5  No ttp medial epicondyle   Ttp lateral epicondyle     _____________________________________________________  STUDIES REVIEWED:  No Studies to review      PROCEDURES PERFORMED:  Procedures  No Procedures performed today

## 2019-01-08 NOTE — TELEPHONE ENCOUNTER
Spoke to patient  He wanted to know the name of the medication that was injected into his shoulder  I told him it was Celestone     He will be calling back to give ma the name of an injection someone told him about  He is interested in finding out if its something that Dr Freedom España does       Please send me the name of the injection when patient calls back  Thanks

## 2019-01-11 ENCOUNTER — OFFICE VISIT (OUTPATIENT)
Dept: OBGYN CLINIC | Facility: HOSPITAL | Age: 49
End: 2019-01-11
Payer: COMMERCIAL

## 2019-01-11 VITALS
HEART RATE: 82 BPM | DIASTOLIC BLOOD PRESSURE: 102 MMHG | BODY MASS INDEX: 28.88 KG/M2 | SYSTOLIC BLOOD PRESSURE: 165 MMHG | HEIGHT: 67 IN | WEIGHT: 184 LBS

## 2019-01-11 DIAGNOSIS — M17.0 PRIMARY OSTEOARTHRITIS OF BOTH KNEES: Primary | ICD-10-CM

## 2019-01-11 PROCEDURE — 20610 DRAIN/INJ JOINT/BURSA W/O US: CPT | Performed by: PHYSICIAN ASSISTANT

## 2019-01-11 RX ADMIN — BETAMETHASONE SODIUM PHOSPHATE AND BETAMETHASONE ACETATE 6 MG: 3; 3 INJECTION, SUSPENSION INTRA-ARTICULAR; INTRALESIONAL; INTRAMUSCULAR; SOFT TISSUE at 11:48

## 2019-01-11 RX ADMIN — LIDOCAINE HYDROCHLORIDE 1 ML: 20 INJECTION, SOLUTION INFILTRATION; PERINEURAL at 11:48

## 2019-01-11 RX ADMIN — BUPIVACAINE HYDROCHLORIDE 2 ML: 2.5 INJECTION, SOLUTION INFILTRATION; PERINEURAL at 11:48

## 2019-01-17 ENCOUNTER — PREP FOR PROCEDURE (OUTPATIENT)
Dept: OBGYN CLINIC | Facility: HOSPITAL | Age: 49
End: 2019-01-17

## 2019-01-18 ENCOUNTER — TELEPHONE (OUTPATIENT)
Dept: PREADMISSION TESTING | Facility: HOSPITAL | Age: 49
End: 2019-01-18

## 2019-01-18 ENCOUNTER — OFFICE VISIT (OUTPATIENT)
Dept: OBGYN CLINIC | Facility: HOSPITAL | Age: 49
End: 2019-01-18
Payer: COMMERCIAL

## 2019-01-18 VITALS
WEIGHT: 184 LBS | HEART RATE: 79 BPM | HEIGHT: 67 IN | DIASTOLIC BLOOD PRESSURE: 99 MMHG | BODY MASS INDEX: 28.88 KG/M2 | SYSTOLIC BLOOD PRESSURE: 178 MMHG

## 2019-01-18 DIAGNOSIS — M17.12 PRIMARY OSTEOARTHRITIS OF LEFT KNEE: Primary | ICD-10-CM

## 2019-01-18 PROCEDURE — 20610 DRAIN/INJ JOINT/BURSA W/O US: CPT | Performed by: PHYSICIAN ASSISTANT

## 2019-01-18 NOTE — PATIENT INSTRUCTIONS
Ankle Exercises   AMBULATORY CARE:   What you need to know about ankle exercises: Ankle exercises help strengthen your ankle and improve its function after injury  These are beginning exercises  Ask your healthcare provider if you need to see a physical therapist for more advanced exercises  · Do these exercises 3 to 5 days a week , or as directed by your healthcare provider  Ask if you should perform the exercises on each ankle  · Do the exercises in the order that your healthcare provider recommends  This will help prevent swelling, chronic pain, and reinjury  Start with range of motion exercises  Then progress to strengthening exercises, and finally to balancing exercises  · Warm up before you do ankle exercises  Walk or ride a stationary bike for 5 to 10 minutes to prepare your ankle for movement  · Stop if you feel pain  It is normal to feel some discomfort at first  Regular exercise will help decrease your discomfort over time  How to perform range of motion exercises safely:  Begin with range of motion exercises to improve flexibility  Ask your healthcare provider when you can progress to strengthening exercises  · Ankle alphabet:  Sit on a chair so that your feet do not touch the floor  Use your big toe to write each letter of the alphabet  Use only your foot and ankle, and keep your movements small  Do 2 sets  · Calf stretches:      ¨ Sitting calf stretches with a towel:  Sit on the floor with both legs out straight in front of you  Loop a towel around the ball of your injured foot  Grasp the ends of the towel and pull it toward you  Keep your leg and back straight  Do not lean forward as you pull the towel  Hold for 30 seconds  Then relax for 30 seconds  Do 2 sets of 10  ¨ Standing calf stretches:  Stand facing a wall with the foot that is not injured forward and your knee slightly bent   Keep the leg with the injured foot straight and behind you with your toes pointed in slightly  With both heels flat on the floor, press your hips forward  Do not arch your back  Hold for 30 seconds, and then relax for 30 seconds  Do 2 sets of 10  Repeat with your leg bent  Do 2 sets of 10  How to perform strengthening exercises safely:  After you can perform range of motion exercises without pain, you may begin strengthening exercises  Ask your healthcare provider when you can progress to balancing exercises  · Ankle movement in 4 directions:  Sit on the floor with your legs straight in front of you  Keep your heels on the floor for support  ¨ Dorsiflexion:  Begin with your toes pointing straight up  Pull your toes toward your body  Slowly return to the starting position  Do 3 sets of 5      ¨ Plantar flexion:  Begin with your toes pointing straight up  Push your toes away from your body  Slowly return to the starting position  Do 3 sets of 5            ¨ Inversion:  Begin with your toes pointing straight up  Push your toes inward, toward each other  Slowly return to the starting position  Do 3 sets of 5      ¨ Eversion:  Begin with your toes pointing straight up  Push your toes outward, away from each other  Slowly return to the starting position  Do 3 sets of 5          · Toe curls with a towel:  Sit on a chair so that both of your feet are flat on the floor  Place a small towel on the floor in front of your injured foot  Grab the center of the towel with your toes and curl the towel toward you  Relax and repeat  Do 1 set of 5            · [place] pick-ups:  Sit on a chair so that both of your feet are flat on the floor  Place 20 marbles on the floor in front of your injured foot  Use your toes to  one marble at a time and place it into a bowl  Repeat until you have picked up all the marbles  Do 1 set  · Heel raises:      ¨ Single leg heel raises:  Stand with your weight evenly on both feet  Hold on to a chair or a wall for balance   Lift the foot that is not injured off the floor so all your weight is placed on your injured foot  Raise the heel of your injured foot as high as you can  Slowly lower your heel to the floor  Do 1 set of 10  ¨ Double leg heel raises:  Stand with your weight evenly on both feet  Hold on to a chair or a wall for balance  Raise both of your heels as high as you can  Slowly lower your heels to the floor  Do 1 set of 10  · Heel and toe walks:      ¨ Heel walks:  Begin in a standing position  Lift your toes off the floor and walk on your heels  Keep your toes lifted as high as possible  Do 2 sets of 10  ¨ Toe walks:  Begin in a standing position  Lift your heels off the floor and walk on the balls and toes of your feet  Keep your heels lifted as high as possible  Do 2 sets of 10  How to perform a balance exercise safely:  After you can perform strengthening exercises without pain, you may do this beginning balancing exercise  Ask your healthcare provider for more advanced balance exercises  · Single leg stance:  Stand with your weight evenly on both feet, or hold on to a chair or a wall  Do not lean to the side  Lift the foot that is not injured off the floor so all your weight is placed on your injured foot  Balance on your injured foot  Ask your healthcare provider how long to hold this position  Contact your healthcare provider if:   · Your pain becomes worse  · You have new pain  · You have questions or concerns about your condition, care, or exercise program   © 2017 [employer] Information is for End User's use only and may not be sold, redistributed or otherwise used for commercial purposes  All illustrations and images included in CareNotes® are the copyrighted property of [employer]  or Maury Amaral  The above information is an  only  It is not intended as medical advice for individual conditions or treatments   Talk to your doctor, nurse or pharmacist before following any medical regimen to see if it is safe and effective for you  Knee Exercises   AMBULATORY CARE:   What you need to know about knee exercises:  Knee exercises help strengthen the muscles around your knee  Strong muscles can help reduce pain and decrease your risk of future injury  Knee exercises also help you heal after an injury or surgery  · Start slow  These are beginning exercises  Ask your healthcare provider if you need to see a physical therapist for more advanced exercises  As you get stronger, you may be able to do more sets of each exercise or add weights  · Stop if you feel pain  It is normal to feel some discomfort at first  Regular exercise will help decrease your discomfort over time  · Do the exercises on both legs  Do this so both knees remain strong  · Warm up before you do knee exercises  Walk or ride a stationary bike for 5 or 10 minutes to warm your muscles  How to perform knee stretches safely:  Always stretch before you do strengthening exercises  Do these stretching exercises again after you do the strengthening exercises  Do these stretches 4 or 5 days a week, or as directed  · Standing calf stretch: Face a wall and place both palms flat on the wall, or hold the back of a chair for balance  Keep a slight bend in your knees  Take a big step backward with one leg  Keep your other leg directly under you  Keep both heels flat and press your hips forward  Hold the stretch for 30 seconds, and then relax for 30 seconds  Switch legs  Repeat 2 or 3 times on each leg  · Standing quadriceps stretch:  Stand and place one hand against a wall or hold the back of a chair for balance  With your weight on one leg, bend your other leg and grab your ankle  Bring your heel toward your buttocks  Hold the stretch for 30 to 60 seconds  Switch legs  Repeat 2 or 3 times on each leg  · Sitting hamstring stretch:  Sit with both legs straight in front of you   Do not point or flex your toes  Place your palms on the floor and slide your hands forward until you feel the stretch  Do not round your back  Hold the stretch for 30 seconds  Repeat 2 or 3 times  How to perform knee strengthening exercises safely:  Do these exercises 4 or 5 days a week, or as directed  · Standing half squats:  Stand with your feet shoulder-width apart  Lean your back against a wall or hold the back of a chair for balance, if needed  Slowly sit down about 10 inches, as if you are going to sit in a chair  Your body weight should be mostly over your heels  Hold the squat for 5 seconds, then rise to a standing position  Do 3 sets of 10 squats to strengthen your buttocks and thighs  · Standing hamstring curls: Face a wall and place both palms flat on the wall, or hold the back of a chair for balance  With your weight on one leg, lift your other foot as close to your buttocks as you can  Hold for 5 seconds and then lower your leg  Do 2 sets of 10 curls on each leg  This exercise strengthens the muscles in the back of your thigh  · Standing calf raises:  Face a wall and place both palms flat on the wall, or hold the back of a chair for balance  Stand up straight, and do not lean  Place all your weight on one leg by lifting the other foot off the floor  Raise the heel of the foot that is on the floor as high as you can and then lower it  Do 2 sets of 10 calf raises on each leg to strengthen your calf muscles  · Straight leg lifts:  Lie on your stomach with straight legs  Fold your arms in front of you and rest your head in your arms  Tighten your leg muscles and raise one leg as high as you can  Hold for 5 seconds, then lower your leg  Do 2 sets of 10 lifts on each leg to strengthen your buttocks  · Sitting leg lifts:  Sit in a chair  Slowly straighten and raise one leg  Squeeze your thigh muscles and hold for 5 seconds  Relax and return your foot to the floor   Do 2 sets of 10 lifts on each leg  This helps strengthen the muscles in the front of your thigh  Contact your healthcare provider if:   · You have new pain or your pain becomes worse  · You have questions or concerns about your condition or care  © 2017 [employer] Information is for End User's use only and may not be sold, redistributed or otherwise used for commercial purposes  All illustrations and images included in CareNotes® are the copyrighted property of [employer]  or Maury Amaral  The above information is an  only  It is not intended as medical advice for individual conditions or treatments  Talk to your doctor, nurse or pharmacist before following any medical regimen to see if it is safe and effective for you

## 2019-01-21 ENCOUNTER — PREP FOR PROCEDURE (OUTPATIENT)
Dept: OBGYN CLINIC | Facility: HOSPITAL | Age: 49
End: 2019-01-21

## 2019-01-21 DIAGNOSIS — Z01.818 PRE-OP TESTING: Primary | ICD-10-CM

## 2019-01-24 ENCOUNTER — TELEPHONE (OUTPATIENT)
Dept: PREADMISSION TESTING | Facility: HOSPITAL | Age: 49
End: 2019-01-24

## 2019-01-25 ENCOUNTER — OFFICE VISIT (OUTPATIENT)
Dept: OBGYN CLINIC | Facility: HOSPITAL | Age: 49
End: 2019-01-25
Payer: COMMERCIAL

## 2019-01-25 VITALS
BODY MASS INDEX: 28.88 KG/M2 | DIASTOLIC BLOOD PRESSURE: 113 MMHG | WEIGHT: 184 LBS | HEART RATE: 75 BPM | HEIGHT: 67 IN | SYSTOLIC BLOOD PRESSURE: 167 MMHG

## 2019-01-25 DIAGNOSIS — M17.12 PRIMARY OSTEOARTHRITIS OF LEFT KNEE: Primary | ICD-10-CM

## 2019-01-25 PROCEDURE — 20610 DRAIN/INJ JOINT/BURSA W/O US: CPT | Performed by: PHYSICIAN ASSISTANT

## 2019-01-28 ENCOUNTER — TELEPHONE (OUTPATIENT)
Dept: PREADMISSION TESTING | Facility: HOSPITAL | Age: 49
End: 2019-01-28

## 2019-01-29 ENCOUNTER — TELEPHONE (OUTPATIENT)
Dept: PREADMISSION TESTING | Facility: HOSPITAL | Age: 49
End: 2019-01-29

## 2019-01-29 ENCOUNTER — TELEPHONE (OUTPATIENT)
Dept: OBGYN CLINIC | Facility: HOSPITAL | Age: 49
End: 2019-01-29

## 2019-01-29 ENCOUNTER — APPOINTMENT (OUTPATIENT)
Dept: LAB | Facility: HOSPITAL | Age: 49
End: 2019-01-29
Payer: COMMERCIAL

## 2019-01-29 NOTE — TELEPHONE ENCOUNTER
Patient called stating he does not know why he needs to go back to get more blood drawn   He doesn't know why "they need so much blood work "

## 2019-01-29 NOTE — TELEPHONE ENCOUNTER
I'm assuming this is regarding preop labwork? All I see are CBC, BMP and PTT  Is there anything else I'm not seeing?

## 2019-01-29 NOTE — TELEPHONE ENCOUNTER
Sandy explained to pt that minimal labs are only needed   He hasn't had any bloodwork done so im not sure what he means by "they need so much blood"

## 2019-01-29 NOTE — TELEPHONE ENCOUNTER
Ok, thanks for checking  By your wording it sounds like you already spoke to him? If not let me know and I can call   thanks

## 2019-02-01 RX ORDER — BUPIVACAINE HYDROCHLORIDE 2.5 MG/ML
2 INJECTION, SOLUTION INFILTRATION; PERINEURAL
Status: COMPLETED | OUTPATIENT
Start: 2019-01-11 | End: 2019-01-11

## 2019-02-01 RX ORDER — BETAMETHASONE SODIUM PHOSPHATE AND BETAMETHASONE ACETATE 3; 3 MG/ML; MG/ML
6 INJECTION, SUSPENSION INTRA-ARTICULAR; INTRALESIONAL; INTRAMUSCULAR; SOFT TISSUE
Status: COMPLETED | OUTPATIENT
Start: 2019-01-11 | End: 2019-01-11

## 2019-02-01 RX ORDER — LIDOCAINE HYDROCHLORIDE 20 MG/ML
1 INJECTION, SOLUTION INFILTRATION; PERINEURAL
Status: COMPLETED | OUTPATIENT
Start: 2019-01-11 | End: 2019-01-11

## 2019-02-01 NOTE — PROGRESS NOTES
53 y o  male presenting to the office for Synvisc injection # 2/3 for chronic left knee osteoarthritis  Patient did not have problems with their last injection  Patient states that today the pain is slightly improved from their last injection  His right knee did not have any significant change from the injection at this time  Patient does not have any locking, giving out, or instability  Patient does not have any symptoms of an infection  Patient denies any other acute or associated complaints  ROS  Review of Systems   Constitutional: Negative for fever and unexpected weight change  HENT: Negative for hearing loss, nosebleeds and sore throat  Eyes: Negative for pain, redness and visual disturbance  Respiratory: Negative for cough, shortness of breath and wheezing  Cardiovascular: Negative for chest pain, palpitations and leg swelling  Gastrointestinal: Negative for abdominal pain, nausea and vomiting  Endocrine: Negative for polydipsia and polyuria  Genitourinary: Negative for dysuria and hematuria  Skin: Negative for rash and wound  Neurological: Negative for dizziness, numbness and headaches  Psychiatric/Behavioral: Negative for agitation and suicidal ideas         Past Medical History:   Diagnosis Date    Chronic back pain     Fibromyalgia     Hypertension     [facility]      Past Surgical History:   Procedure Laterality Date    CARPAL TUNNEL RELEASE Left     LATERAL EPICONDYLE RELEASE Right 2/25/2016    Procedure: RELEASE EPICONDYLAR ELBOW, LATERAL;  Surgeon: Trisha Reyes MD;  [facility];  Service:     MT REPAIR ACHILLES TENDON,PRIMARY Left 4/2/2018    Procedure: REPAIR TENDON ACHILLES;  Surgeon: Frankie Pendleton MD;  [facility];  Service: Orthopedics    ROTATOR CUFF REPAIR Left     ULNAR TUNNEL RELEASE Left     x2     Results Reviewed     None          Physical Exam  Physical Exam   Constitutional: He is oriented to person, place, and time  He appears well-developed and well-nourished  HENT:   Head: Normocephalic and atraumatic  Eyes: Pupils are equal, round, and reactive to light  EOM are normal    Neck: Neck supple  No tracheal deviation present  Cardiovascular: Normal rate and regular rhythm  Pulmonary/Chest: Effort normal and breath sounds normal    Abdominal: There is no guarding  Musculoskeletal:        Left knee: He exhibits no effusion  Neurological: He is alert and oriented to person, place, and time  Skin: Skin is warm and dry  Psychiatric: He has a normal mood and affect  His behavior is normal      Left Knee Exam     Tenderness   The patient is experiencing no tenderness  Range of Motion   Extension: 0   Flexion: 140     Muscle Strength     The patient has normal left knee strength      Other   Erythema: absent  Sensation: normal  Swelling: none  Effusion: no effusion present            Procedures  Large joint arthrocentesis  Date/Time: 1/18/2019 11:47 AM  Consent given by: patient  Timeout: Immediately prior to procedure a time out was called to verify the correct patient, procedure, equipment, support staff and site/side marked as required   Supporting Documentation  Indications: pain   Procedure Details  Location: knee - L knee  Needle size: 22 G  Ultrasound guidance: no  Approach: anterolateral  Medications administered: 16 mg hylan 16 MG/2ML  Specialty Pharmacy Supplied: received medications from pharmacy  Patient tolerance: patient tolerated the procedure well with no immediate complications  Dressing:  Sterile dressing applied        Assessment/Plan  53 y o  male with osteoarthritis of left knee  · Continue with hyaluronic acid injections q 6 months PRN  · Follow up in 1 week for injection # 3/3 in the series  · Patient can get corticosteroid injections q 3 months PRN  · Continue with activities as tolerated

## 2019-02-01 NOTE — PROGRESS NOTES
53 y o  male presenting to the office for Synvisc injection # 3/3 for chronic left knee osteoarthritis  Patient did not have problems with their last injection  Patient states that today the pain is improved from their last injection  Patient does not have any locking, giving out, or instability  Patient does not have any symptoms of an infection  Patient denies any other acute or associated complaints  ROS  Review of Systems   Constitutional: Negative for fever and unexpected weight change  HENT: Negative for hearing loss, nosebleeds and sore throat  Eyes: Negative for pain, redness and visual disturbance  Respiratory: Negative for cough, shortness of breath and wheezing  Cardiovascular: Negative for chest pain, palpitations and leg swelling  Gastrointestinal: Negative for abdominal pain, nausea and vomiting  Endocrine: Negative for polydipsia and polyuria  Genitourinary: Negative for dysuria and hematuria  Skin: Negative for rash and wound  Neurological: Negative for dizziness and headaches  Psychiatric/Behavioral: Negative for agitation and suicidal ideas  Past Medical History:   Diagnosis Date    Chronic back pain     Fibromyalgia     Hypertension     [facility]      Past Surgical History:   Procedure Laterality Date    CARPAL TUNNEL RELEASE Left     LATERAL EPICONDYLE RELEASE Right 2/25/2016    Procedure: RELEASE EPICONDYLAR ELBOW, LATERAL;  Surgeon: Jearldine Goodell, MD;  [facility];  Service:     DC REPAIR ACHILLES TENDON,PRIMARY Left 4/2/2018    Procedure: REPAIR TENDON ACHILLES;  Surgeon: Lexis Augustin MD;  [facility];  Service: Orthopedics    ROTATOR CUFF REPAIR Left     ULNAR TUNNEL RELEASE Left     x2     Results Reviewed     None          Physical Exam  Physical Exam   Constitutional: He is oriented to person, place, and time  He appears well-developed and well-nourished  HENT:   Head: Normocephalic and atraumatic  Eyes: Pupils are equal, round, and reactive to light  EOM are normal    Neck: Neck supple  No tracheal deviation present  Cardiovascular: Normal rate and regular rhythm  Pulmonary/Chest: Effort normal and breath sounds normal    Abdominal: There is no guarding  Musculoskeletal:        Left knee: He exhibits no effusion  Neurological: He is alert and oriented to person, place, and time  Skin: Skin is warm and dry  Psychiatric: He has a normal mood and affect  His behavior is normal      Left Knee Exam     Tenderness   The patient is experiencing no tenderness  Range of Motion   The patient has normal left knee ROM  Muscle Strength     The patient has normal left knee strength      Other   Erythema: absent  Sensation: normal  Swelling: none  Effusion: no effusion present            Procedures  Large joint arthrocentesis  Date/Time: 1/25/2019 12:27 PM  Consent given by: patient  Timeout: Immediately prior to procedure a time out was called to verify the correct patient, procedure, equipment, support staff and site/side marked as required   Supporting Documentation  Indications: pain   Procedure Details  Location: knee - L knee  Needle size: 22 G  Ultrasound guidance: no  Approach: anterolateral  Medications administered: 16 mg hylan 16 MG/2ML  Specialty Pharmacy Supplied: received medications from pharmacy  Patient tolerance: patient tolerated the procedure well with no immediate complications  Dressing:  Sterile dressing applied        Assessment/Plan  53 y o  male with osteoarthritis of the left knee  · Continue with hyaluronic acid injections q 6 months PRN  · Patient can get corticosteroid injections q 3 months PRN  · Follow up in 4 weeks for re-evaluation  · Continue with activities as tolerated  · Reminded patient to be protective of his achilles tendons

## 2019-02-01 NOTE — PROGRESS NOTES
53 y o  male presenting to the office for Synvisc injection # 1/3 for chronic left knee osteoarthritis  Patient had some improvement in pain with the corticosteroid injection  He is able to get through his regular daily activities  Patient has sensation of instability  Patient does not have any symptoms of an infection  Patient states that his heels have pain bilaterally, and he knows that he needs to keep shoe inserts in his shoes to help protect his achilles tendon  He also has right knee pain, and he would like a corticosteroid injection for this today  Patient denies any other acute or associated complaints  ROS  Review of Systems   Constitutional: Negative for fever and unexpected weight change  HENT: Negative for hearing loss, nosebleeds and sore throat  Eyes: Negative for pain, redness and visual disturbance  Respiratory: Negative for cough, shortness of breath and wheezing  Cardiovascular: Negative for chest pain, palpitations and leg swelling  Gastrointestinal: Negative for abdominal pain, nausea and vomiting  Endocrine: Negative for polydipsia and polyuria  Genitourinary: Negative for dysuria and hematuria  Skin: Negative for rash and wound  Neurological: Negative for dizziness and headaches  Psychiatric/Behavioral: Negative for agitation and suicidal ideas         Past Medical History:   Diagnosis Date    Chronic back pain     Fibromyalgia     Hypertension     [facility]      Past Surgical History:   Procedure Laterality Date    CARPAL TUNNEL RELEASE Left     LATERAL EPICONDYLE RELEASE Right 2/25/2016    Procedure: RELEASE EPICONDYLAR ELBOW, LATERAL;  Surgeon: Farooq Cutler MD;  [facility];  Service:     [place] REPAIR ACHILLES TENDON,PRIMARY Left 4/2/2018    Procedure: REPAIR TENDON ACHILLES;  Surgeon: Neeru Greene MD;  [facility];  Service: Orthopedics    ROTATOR CUFF REPAIR Left     ULNAR TUNNEL RELEASE Left     x2     Results Reviewed     None          Physical Exam  Physical Exam   Constitutional: He is oriented to person, place, and time  He appears well-developed and well-nourished  HENT:   Head: Normocephalic and atraumatic  Eyes: Pupils are equal, round, and reactive to light  EOM are normal    Neck: Neck supple  No tracheal deviation present  Cardiovascular: Normal rate and regular rhythm  Pulmonary/Chest: Effort normal and breath sounds normal    Abdominal: There is no guarding  Musculoskeletal:        Right knee: He exhibits no effusion  Left knee: He exhibits no effusion  Neurological: He is alert and oriented to person, place, and time  Skin: Skin is warm and dry  Psychiatric: He has a normal mood and affect  His behavior is normal      Right Knee Exam     Tenderness   The patient is experiencing no tenderness  Range of Motion   The patient has normal right knee ROM  Muscle Strength     The patient has normal right knee strength  Tests   Varus: negative  Valgus: negative    Other   Erythema: absent  Sensation: normal  Swelling: none  Other tests: no effusion present      Left Knee Exam     Tenderness   The patient is experiencing no tenderness  Range of Motion   The patient has normal left knee ROM  Muscle Strength     The patient has normal left knee strength      Tests   Varus: negative  Valgus: negative    Other   Erythema: absent  Sensation: normal  Swelling: none  Effusion: no effusion present        Procedures  Large joint arthrocentesis  Date/Time: 1/11/2019 11:47 AM  Consent given by: patient  Timeout: Immediately prior to procedure a time out was called to verify the correct patient, procedure, equipment, support staff and site/side marked as required   Supporting Documentation  Indications: pain   Procedure Details  Location: knee - L knee  Needle size: 22 G  Ultrasound guidance: no  Approach: anterolateral  Medications administered: 16 mg hylan 16 MG/2ML  Specialty Pharmacy Supplied: received medications from pharmacy  Patient tolerance: patient tolerated the procedure well with no immediate complications  Dressing:  Sterile dressing applied  Large joint arthrocentesis  Date/Time: 1/11/2019 11:48 AM  Consent given by: patient  Timeout: Immediately prior to procedure a time out was called to verify the correct patient, procedure, equipment, support staff and site/side marked as required   Supporting Documentation  Indications: pain   Procedure Details  Location: knee - R knee  Preparation: Patient was prepped and draped in the usual sterile fashion  Needle size: 22 G  Ultrasound guidance: no  Approach: anterolateral  Medications administered: 2 mL bupivacaine 0 25 %; 6 mg betamethasone acetate-betamethasone sodium phosphate 6 (3-3) mg/mL; 1 mL lidocaine 2 %    Patient tolerance: patient tolerated the procedure well with no immediate complications  Dressing:  Sterile dressing applied    Assessment/Plan  53 y o  male with osteoarthritis of bilateral knees  · Continue with hyaluronic acid injections q 6 months PRN (left knee only at this time)  · Follow up in 1 week for injection # 2/3 in the series  · Patient can get corticosteroid injections q 3 months PRN (bilaterally)  · Continue with activities as tolerated

## 2019-02-12 ENCOUNTER — ANESTHESIA EVENT (OUTPATIENT)
Dept: PERIOP | Facility: HOSPITAL | Age: 49
End: 2019-02-12
Payer: COMMERCIAL

## 2019-02-12 ENCOUNTER — HOSPITAL ENCOUNTER (OUTPATIENT)
Facility: HOSPITAL | Age: 49
Setting detail: OUTPATIENT SURGERY
Discharge: HOME | End: 2019-02-12
Attending: ORTHOPAEDIC SURGERY | Admitting: ORTHOPAEDIC SURGERY
Payer: COMMERCIAL

## 2019-02-12 ENCOUNTER — ANESTHESIA (OUTPATIENT)
Dept: PERIOP | Facility: HOSPITAL | Age: 49
End: 2019-02-12
Payer: COMMERCIAL

## 2019-02-12 VITALS
OXYGEN SATURATION: 95 % | WEIGHT: 180 LBS | HEIGHT: 68 IN | TEMPERATURE: 97 F | BODY MASS INDEX: 27.28 KG/M2 | RESPIRATION RATE: 16 BRPM | DIASTOLIC BLOOD PRESSURE: 83 MMHG | HEART RATE: 94 BPM | SYSTOLIC BLOOD PRESSURE: 156 MMHG

## 2019-02-12 DIAGNOSIS — M77.12 LATERAL EPICONDYLITIS OF LEFT ELBOW: Primary | ICD-10-CM

## 2019-02-12 PROCEDURE — 88305 TISSUE EXAM BY PATHOLOGIST: CPT | Performed by: PATHOLOGY

## 2019-02-12 PROCEDURE — 24358 REPAIR ELBOW W/DEB OPEN: CPT | Performed by: ORTHOPAEDIC SURGERY

## 2019-02-12 RX ORDER — DEXMEDETOMIDINE HYDROCHLORIDE 100 UG/ML
INJECTION, SOLUTION INTRAVENOUS AS NEEDED
Status: DISCONTINUED | OUTPATIENT
Start: 2019-02-12 | End: 2019-02-12 | Stop reason: SURG

## 2019-02-12 RX ORDER — FENTANYL CITRATE/PF 50 MCG/ML
25 SYRINGE (ML) INJECTION
Status: COMPLETED | OUTPATIENT
Start: 2019-02-12 | End: 2019-02-12

## 2019-02-12 RX ORDER — ONDANSETRON 2 MG/ML
4 INJECTION INTRAMUSCULAR; INTRAVENOUS ONCE
Status: COMPLETED | OUTPATIENT
Start: 2019-02-12 | End: 2019-02-12

## 2019-02-12 RX ORDER — EPHEDRINE SULFATE 50 MG/ML
INJECTION, SOLUTION INTRAVENOUS AS NEEDED
Status: DISCONTINUED | OUTPATIENT
Start: 2019-02-12 | End: 2019-02-12 | Stop reason: SURG

## 2019-02-12 RX ORDER — HYDROCODONE BITARTRATE AND ACETAMINOPHEN 5; 325 MG/1; MG/1
1 TABLET ORAL EVERY 6 HOURS PRN
Qty: 10 TABLET | Refills: 0 | Status: SHIPPED | OUTPATIENT
Start: 2019-02-12 | End: 2019-02-22

## 2019-02-12 RX ORDER — PROPOFOL 10 MG/ML
INJECTION, EMULSION INTRAVENOUS AS NEEDED
Status: DISCONTINUED | OUTPATIENT
Start: 2019-02-12 | End: 2019-02-12 | Stop reason: SURG

## 2019-02-12 RX ORDER — SODIUM CHLORIDE, SODIUM LACTATE, POTASSIUM CHLORIDE, CALCIUM CHLORIDE 600; 310; 30; 20 MG/100ML; MG/100ML; MG/100ML; MG/100ML
20 INJECTION, SOLUTION INTRAVENOUS CONTINUOUS
Status: DISCONTINUED | OUTPATIENT
Start: 2019-02-12 | End: 2019-02-12 | Stop reason: HOSPADM

## 2019-02-12 RX ORDER — LIDOCAINE HYDROCHLORIDE 10 MG/ML
INJECTION, SOLUTION INFILTRATION; PERINEURAL AS NEEDED
Status: DISCONTINUED | OUTPATIENT
Start: 2019-02-12 | End: 2019-02-12 | Stop reason: SURG

## 2019-02-12 RX ORDER — HYDROMORPHONE HCL/PF 1 MG/ML
0.5 SYRINGE (ML) INJECTION ONCE AS NEEDED
Status: COMPLETED | OUTPATIENT
Start: 2019-02-12 | End: 2019-02-12

## 2019-02-12 RX ORDER — HYDROMORPHONE HYDROCHLORIDE 2 MG/ML
INJECTION, SOLUTION INTRAMUSCULAR; INTRAVENOUS; SUBCUTANEOUS AS NEEDED
Status: DISCONTINUED | OUTPATIENT
Start: 2019-02-12 | End: 2019-02-12 | Stop reason: SURG

## 2019-02-12 RX ORDER — ONDANSETRON 2 MG/ML
INJECTION INTRAMUSCULAR; INTRAVENOUS AS NEEDED
Status: DISCONTINUED | OUTPATIENT
Start: 2019-02-12 | End: 2019-02-12 | Stop reason: SURG

## 2019-02-12 RX ORDER — HYDROCODONE BITARTRATE AND ACETAMINOPHEN 5; 325 MG/1; MG/1
1 TABLET ORAL EVERY 6 HOURS PRN
Status: DISCONTINUED | OUTPATIENT
Start: 2019-02-12 | End: 2019-02-12 | Stop reason: HOSPADM

## 2019-02-12 RX ORDER — FENTANYL CITRATE 50 UG/ML
INJECTION, SOLUTION INTRAMUSCULAR; INTRAVENOUS AS NEEDED
Status: DISCONTINUED | OUTPATIENT
Start: 2019-02-12 | End: 2019-02-12 | Stop reason: SURG

## 2019-02-12 RX ORDER — ACETAMINOPHEN 325 MG/1
650 TABLET ORAL ONCE
Status: DISCONTINUED | OUTPATIENT
Start: 2019-02-12 | End: 2019-02-12 | Stop reason: HOSPADM

## 2019-02-12 RX ORDER — ONDANSETRON 2 MG/ML
4 INJECTION INTRAMUSCULAR; INTRAVENOUS ONCE AS NEEDED
Status: DISCONTINUED | OUTPATIENT
Start: 2019-02-12 | End: 2019-02-12 | Stop reason: HOSPADM

## 2019-02-12 RX ORDER — SODIUM CHLORIDE, SODIUM LACTATE, POTASSIUM CHLORIDE, CALCIUM CHLORIDE 600; 310; 30; 20 MG/100ML; MG/100ML; MG/100ML; MG/100ML
50 INJECTION, SOLUTION INTRAVENOUS CONTINUOUS
Status: DISCONTINUED | OUTPATIENT
Start: 2019-02-12 | End: 2019-02-12 | Stop reason: HOSPADM

## 2019-02-12 RX ORDER — SENNOSIDES 8.6 MG
650 CAPSULE ORAL EVERY 8 HOURS
Qty: 15 TABLET | Refills: 0 | Status: SHIPPED | OUTPATIENT
Start: 2019-02-12 | End: 2019-04-19

## 2019-02-12 RX ORDER — LIDOCAINE HYDROCHLORIDE AND EPINEPHRINE 10; 10 MG/ML; UG/ML
20 INJECTION, SOLUTION INFILTRATION; PERINEURAL ONCE
Status: COMPLETED | OUTPATIENT
Start: 2019-02-12 | End: 2019-02-12

## 2019-02-12 RX ORDER — MIDAZOLAM HYDROCHLORIDE 1 MG/ML
INJECTION INTRAMUSCULAR; INTRAVENOUS AS NEEDED
Status: DISCONTINUED | OUTPATIENT
Start: 2019-02-12 | End: 2019-02-12 | Stop reason: SURG

## 2019-02-12 RX ADMIN — HYDROMORPHONE HYDROCHLORIDE 0.5 MG: 2 INJECTION, SOLUTION INTRAMUSCULAR; INTRAVENOUS; SUBCUTANEOUS at 10:13

## 2019-02-12 RX ADMIN — EPHEDRINE SULFATE 5 MG: 50 INJECTION, SOLUTION INTRAMUSCULAR; INTRAVENOUS; SUBCUTANEOUS at 10:02

## 2019-02-12 RX ADMIN — FENTANYL CITRATE 25 MCG: 50 INJECTION, SOLUTION INTRAMUSCULAR; INTRAVENOUS at 11:03

## 2019-02-12 RX ADMIN — PROPOFOL 200 MG: 10 INJECTION, EMULSION INTRAVENOUS at 09:27

## 2019-02-12 RX ADMIN — DEXMEDETOMIDINE HYDROCHLORIDE 8 MCG: 100 INJECTION, SOLUTION INTRAVENOUS at 09:49

## 2019-02-12 RX ADMIN — PROPOFOL 50 MG: 10 INJECTION, EMULSION INTRAVENOUS at 09:46

## 2019-02-12 RX ADMIN — FENTANYL CITRATE 25 MCG: 50 INJECTION, SOLUTION INTRAMUSCULAR; INTRAVENOUS at 11:00

## 2019-02-12 RX ADMIN — FENTANYL CITRATE 25 MCG: 50 INJECTION, SOLUTION INTRAMUSCULAR; INTRAVENOUS at 09:27

## 2019-02-12 RX ADMIN — LIDOCAINE HYDROCHLORIDE 50 MG: 10 INJECTION, SOLUTION INFILTRATION; PERINEURAL at 09:27

## 2019-02-12 RX ADMIN — SODIUM CHLORIDE, SODIUM LACTATE, POTASSIUM CHLORIDE, AND CALCIUM CHLORIDE 20 ML/HR: .6; .31; .03; .02 INJECTION, SOLUTION INTRAVENOUS at 09:00

## 2019-02-12 RX ADMIN — ONDANSETRON 4 MG: 2 INJECTION INTRAMUSCULAR; INTRAVENOUS at 09:37

## 2019-02-12 RX ADMIN — ONDANSETRON 4 MG: 2 INJECTION INTRAMUSCULAR; INTRAVENOUS at 11:47

## 2019-02-12 RX ADMIN — EPHEDRINE SULFATE 5 MG: 50 INJECTION, SOLUTION INTRAMUSCULAR; INTRAVENOUS; SUBCUTANEOUS at 09:57

## 2019-02-12 RX ADMIN — FENTANYL CITRATE 75 MCG: 50 INJECTION, SOLUTION INTRAMUSCULAR; INTRAVENOUS at 09:35

## 2019-02-12 RX ADMIN — DEXMEDETOMIDINE HYDROCHLORIDE 4 MCG: 100 INJECTION, SOLUTION INTRAVENOUS at 09:37

## 2019-02-12 RX ADMIN — DEXMEDETOMIDINE HYDROCHLORIDE 4 MCG: 100 INJECTION, SOLUTION INTRAVENOUS at 09:32

## 2019-02-12 RX ADMIN — SODIUM CHLORIDE, SODIUM LACTATE, POTASSIUM CHLORIDE, AND CALCIUM CHLORIDE: .6; .31; .03; .02 INJECTION, SOLUTION INTRAVENOUS at 09:22

## 2019-02-12 RX ADMIN — HYDROCODONE BITARTRATE AND ACETAMINOPHEN 1 TABLET: 5; 325 TABLET ORAL at 13:13

## 2019-02-12 RX ADMIN — MIDAZOLAM 2 MG: 1 INJECTION INTRAMUSCULAR; INTRAVENOUS at 09:22

## 2019-02-12 RX ADMIN — DEXAMETHASONE SODIUM PHOSPHATE 10 MG: 10 INJECTION INTRAMUSCULAR; INTRAVENOUS at 09:37

## 2019-02-12 RX ADMIN — Medication 2000 MG: at 09:27

## 2019-02-12 RX ADMIN — HYDROMORPHONE HYDROCHLORIDE 0.5 MG: 1 INJECTION, SOLUTION INTRAMUSCULAR; INTRAVENOUS; SUBCUTANEOUS at 11:05

## 2019-02-12 NOTE — ANESTHESIA POSTPROCEDURE EVALUATION
Post-Op Assessment Note    CV Status:  Stable    Pain management: adequate     Mental Status:  Sleepy   Hydration Status:  Euvolemic   PONV Controlled:  Controlled   Airway Patency:  Patent   Post Op Vitals Reviewed: Yes      Staff: CRNA, Anesthesiologist           BP (P) 147/75 (02/12/19 1026)    Temp (!) (P) 97 1 °F (36 2 °C) (02/12/19 1026)    Pulse (P) 98 (02/12/19 1026)   Resp (P) 12 (02/12/19 1026)    SpO2 (P) 97 % (02/12/19 1026)

## 2019-02-12 NOTE — OP NOTE
OPERATIVE REPORT  PATIENT NAME: Jaun Alexandre  :  1970  MRN: [id number]  Pt Location: [facility]    SURGERY DATE: 19    Surgeon(s) and Role:     * Santos Melvin MD - Primary     * Dmitry Stanton - Assisting    Pre-Op Diagnosis:  Lateral epicondylitis of left elbow [M77 12]    Post-Op Diagnosis Codes:     * Lateral epicondylitis of left elbow [M77 12]    Procedure(s):  RELEASE EPICONDYLAR ELBOW (Left)    Specimen(s):  Order Name Source Comment Collection Info Order Time   TISSUE EXAM Other  Collected By: Santos Melvin MD 2019  9:54 AM       Estimated Blood Loss:   Minimal      Anesthesia Type:   General    Operative Indications: The patient has a history of Lateral Epicondylitis  left that was recalcitrant to conservative management  The decision was made to bring the patient to the operating room for Lateral Epicondylar release  left  Risks of the procedure were explained which include, but are not limited to bleeding; infection; damage to nerves, arteries,veins, tendons; scar; pain; need for reoperation; failure to give desired result; and risks of anaesthesia  All questions were answered to satisfaction and they were willing to proceed  Operative Findings:  Left lateral epicondylitis    Complications:   None    Procedure and Technique:  After the patient, site, and procedure were identified, the patient was brought into the operating room in a supine position  General anaesthesia and local medication were provided  A well padded tourniquet was applied to the extremity, set at 250 mmHg  The  left upper extremity was then prepped and drapped in a normal, sterile, orthopedic fashion  After the patient, site, and procedure were once again identified, attention was turned to the left elbow  A longitudinal incision was made on the lateral aspect starting at the lateral epicondyle and extending distally    Care was taken to protect the superficial neurovascular structures while maintaining hemostasis  The fascia overlying the muscle was identified  The interval between the extensor carpi radialis longus and the extensor digitorum communis was opened  The origin of the extensor carpi radialis brevis was then identified with angiofibroblastic metaplasia present  While protecting the radial collateral ligament structures, the pathologic tissue was excised  The capitellum was roughened with a rongur to bleeding bone  The area was irrigated with sterile saline solution  After verifying complete removal of the metaplastic tissue, the muscular interval and fascia were closed  The ulnar radial collateral ligament was stressed to ensure that no damage was done and it was competent  At the completion of the procedure, hemostasis was obtained with cautery and direct pressure  The wounds were copiously irrigated with sterile solution  The wounds were closed with Vicryl, Monocryl, Histocryl and Steri-strips  Sterile dressings were applied, including  gauze, tweeners, webril, ACE  Please note, all sponge, needle, and instrument counts were correct prior to closure  Loupe magnification was utilized  The patient tolerated the procedure well       I was present for the entire procedure    Patient Disposition:  PACU , hemodynamically stable and extubated and stable    SIGNATURE: Abby Cooper MD  DATE: 02/12/19  TIME: 10:14 AM

## 2019-02-12 NOTE — H&P
H&P Exam - Orthopedics   Lisa Cintron 53 y o  male MRN: [id number]  Unit/Bed#: [facility] 06    Assessment/Plan   Assessment:  Left lateral epicondylitis  Plan:  Left lateral epicondylar release today under general anesthesia    History of Present Illness   HPI:  Lisa Cintron is a 53 y o  y o  male who presents with left lateral epicondylitis that failed conservative management including therapy, activity modification and splinting  Patient is here today for left lateral epicondylar release under general anesthesia      Historical Information  Review Of Systems:   · Skin: Normal  · Neuro: See HPI  · Musculoskeletal: See HPI  · 14 point review of systems negative except as stated above     Past Medical History:   Past Medical History:   Diagnosis Date    Chronic back pain     Fibromyalgia     Hypertension     RA [facility]rhe[facility]oid arthritis) ([facility] [address] )        Past Surgical History:   Past Surgical History:   Procedure Laterality Date    CARPAL TUNNEL RELEASE Left     LATERAL EPICONDYLE RELEASE Right 2/25/2016    Procedure: RELEASE EPICONDYLAR ELBOW, LATERAL;  Surgeon: Anuja Rasheed MD;  [facility];  Service:     OR REPAIR ACHILLES TENDON,PRIMARY Left 4/2/2018    Procedure: REPAIR TENDON ACHILLES;  Surgeon: Zacarias Zuniga MD;  [facility];  Service: Orthopedics    ROTATOR CUFF REPAIR Left     ULNAR TUNNEL RELEASE Left     x2       Family History:  Family history reviewed and non-contributory  Family History   Problem Relation Age of Onset    Hypertension Mother     Diabetes Mother        Social History:  Social History     Socioeconomic History    Marital status: Single     Spouse name: None    Number of children: None    Years of education: None    Highest education level: None   Occupational History    None   Social Needs    Financial resource strain: None    Food insecurity:     Worry: None     Inability: None    Transportation needs:     Medical: None     Non-medical: None   Tobacco Use  Smoking status: Never Smoker    Smokeless tobacco: Never Used   Substance and Sexual Activity    Alcohol use: No    Drug use: No    Sexual activity: Yes   Lifestyle    Physical activity:     Days per week: None     Minutes per session: None    Stress: None   Relationships    Social connections:     Talks on phone: None     Gets together: None     Attends Gnosticist service: None     Active member of club or organization: None     Attends meetings of clubs or organizations: None     Relationship status: None    Intimate partner violence:     Fear of current or ex partner: None     Emotionally abused: None     Physically abused: None     Forced sexual activity: None   Other Topics Concern    None   Social History Narrative    None       Allergies: Allergies   Allergen Reactions    Lisinopril      Other reaction(s): Other (See Comments)  Cough and rash         Reglan [Metoclopramide] Other (See Comments)     Restless; akisthesia             Labs:  0   Lab Value Date/Time    HCT 48 0 02/09/2018 2239    HCT 46 7 01/27/2016 1237    HCT 47 8 10/27/2015 1646    HCT 47 5 03/25/2014 1033    HGB 17 1 (H) 02/09/2018 2239    HGB 16 1 01/27/2016 1237    HGB 17 0 10/27/2015 1646    HGB 16 3 03/25/2014 1033    INR 1 04 04/02/2018 0745    INR 0 99 10/27/2015 1646    WBC 5 33 02/09/2018 2239    WBC 6 72 01/27/2016 1237    WBC 10 38 (H) 10/27/2015 1646    WBC 12 30 (H) 03/25/2014 1033       Meds:  No current facility-administered medications for this encounter  Blood Culture:   No results found for: BLOODCX    Wound Culture:   No results found for: WOUNDCULT    Ins and Outs:  No intake/output data recorded  Physical Exam  Ht 5' 8" (1 727 m)   Wt 81 6 kg (180 lb)   BMI 27 37 kg/m²   Gen: Alert and oriented to person, place, time  HEENT: EOMI, eyes clear, moist mucus membranes, hearing intact  Respiratory: Bilateral chest rise   No audible wheezing found  Cardiovascular: Regular Rate and Rhythm  Abdomen: soft nontender/nondistended  Ortho Exam:  Tenderness to palpation left lateral epicondyle with pain on resisted wrist extension and passive wrist flexion, no instability to varus or valgus stress testing today  Neuro Exam:   The patient is neurovascularly intact in the median, ulnar, and radial nerve distribution  There is normal sensation and good capillary refill within the digits  2+ pulses              Lab Results: Reviewed  Imaging: Reviewed

## 2019-02-12 NOTE — ANESTHESIA PREPROCEDURE EVALUATION
Review of Systems/Medical History    Chart reviewed  No history of anesthetic complications     Cardiovascular  Exercise tolerance (METS): >4,  Hypertension ,   Comment: Did not take metoprolol this a m ,  Pulmonary  Negative pulmonary ROS        GI/Hepatic  Negative GI/hepatic ROS          Negative  ROS        Endo/Other  History of thyroid disease , hypothyroidism,      GYN       Hematology   Musculoskeletal  Rheumatoid arthritis Severity: mild,   Arthritis     Neurology    Headaches,    Psychology           Physical Exam    Airway    Mallampati score: III  TM Distance: >3 FB  Neck ROM: full     Dental   No notable dental hx     Cardiovascular      Pulmonary      Other Findings        Anesthesia Plan  ASA Score- 2     Anesthesia Type- general with ASA Monitors  Additional Monitors:   Airway Plan: LMA  Plan Factors-    Induction- intravenous  Postoperative Plan-     Informed Consent- Anesthetic plan and risks discussed with patient  I personally reviewed this patient with the CRNA  Discussed and agreed on the Anesthesia Plan with the CRNA  Piotr Velazquez

## 2019-02-13 ENCOUNTER — TELEPHONE (OUTPATIENT)
Dept: OBGYN CLINIC | Facility: HOSPITAL | Age: 49
End: 2019-02-13

## 2019-02-13 NOTE — TELEPHONE ENCOUNTER
Please be advise pt  Called stating he is getting N&V from [facility] and is having pain and swelling to hand  Pt  Stated he is elevating for the swelling and would like to know if there is anything else you can prescribe for his pain   I advised pt  To take 2 tablets extra strength tylenol 500 mg every 8 hrs for pain continue to elevate   Please advise          Thanks

## 2019-02-22 ENCOUNTER — OFFICE VISIT (OUTPATIENT)
Dept: OBGYN CLINIC | Facility: HOSPITAL | Age: 49
End: 2019-02-22

## 2019-02-22 VITALS
WEIGHT: 185 LBS | SYSTOLIC BLOOD PRESSURE: 146 MMHG | HEART RATE: 60 BPM | HEIGHT: 67 IN | DIASTOLIC BLOOD PRESSURE: 95 MMHG | BODY MASS INDEX: 29.03 KG/M2

## 2019-02-22 DIAGNOSIS — M77.12 LATERAL EPICONDYLITIS OF LEFT ELBOW: Primary | ICD-10-CM

## 2019-02-22 PROCEDURE — 99024 POSTOP FOLLOW-UP VISIT: CPT | Performed by: ORTHOPAEDIC SURGERY

## 2019-02-22 NOTE — PROGRESS NOTES
Assessment:   S/p left epicondylar release on 2/12/19    Plan:   Resume activities as tolerated  It was discussed with the patient that they may now begin to wash their incision site with soap and water  They were instructed to let the steri-strips fall off on their own at this point in time and when they do they may begin to apply lotion to their incision site  Lotion enriched in vitamin E, coca butter, scarzone and maderma was discussed with the patient  Follow Up:  PRN    To Do Next Visit:         CHIEF COMPLAINT:  Chief Complaint   Patient presents with    Left Elbow - Post-op         SUBJECTIVE:  Fiona Hauser is a 53y o  year old male who presents for follow up after S/p left epicondylar release on 2/12/19  Today patient has Pain  Mild  Intermittant  Aching to his left forearm         PHYSICAL EXAMINATION:  General: well developed and well nourished, alert, oriented times 3 and appears comfortable  Psychiatric: Normal    MUSCULOSKELETAL EXAMINATION:  Incision: Clean, dry, intact  Range of Motion: full wrist, elbow and finger ROM, full pronation and supination  Neurovascular status: Neuro intact, good cap refill  Activity Restrictions: No restrictions  Done today: Sutures out and Steri strips applied      STUDIES REVIEWED:  No Studies to review      PROCEDURES PERFORMED:  Procedures  No Procedures performed today   Scribe Attestation    I,:   Juliann Alvarado am acting as a scribe while in the presence of the attending physician :        I,:   Anabel Albarado MD personally performed the services described in this documentation    as scribed in my presence :

## 2019-03-08 ENCOUNTER — OFFICE VISIT (OUTPATIENT)
Dept: OBGYN CLINIC | Facility: HOSPITAL | Age: 49
End: 2019-03-08
Payer: COMMERCIAL

## 2019-03-08 VITALS
HEART RATE: 61 BPM | SYSTOLIC BLOOD PRESSURE: 154 MMHG | WEIGHT: 184 LBS | HEIGHT: 67 IN | BODY MASS INDEX: 28.88 KG/M2 | DIASTOLIC BLOOD PRESSURE: 98 MMHG

## 2019-03-08 DIAGNOSIS — M77.12 LATERAL EPICONDYLITIS OF LEFT ELBOW: Primary | ICD-10-CM

## 2019-03-08 PROCEDURE — 20551 NJX 1 TENDON ORIGIN/INSJ: CPT | Performed by: ORTHOPAEDIC SURGERY

## 2019-03-08 PROCEDURE — 99024 POSTOP FOLLOW-UP VISIT: CPT | Performed by: ORTHOPAEDIC SURGERY

## 2019-03-08 RX ORDER — TRAMADOL HYDROCHLORIDE 50 MG/1
50 TABLET ORAL EVERY 6 HOURS PRN
COMMUNITY

## 2019-03-08 RX ORDER — AMOXICILLIN 500 MG/1
500 TABLET, FILM COATED ORAL 2 TIMES DAILY
COMMUNITY
End: 2019-04-19

## 2019-03-12 ENCOUNTER — OFFICE VISIT (OUTPATIENT)
Dept: OBGYN CLINIC | Facility: HOSPITAL | Age: 49
End: 2019-03-12
Payer: COMMERCIAL

## 2019-03-12 VITALS
BODY MASS INDEX: 28.88 KG/M2 | SYSTOLIC BLOOD PRESSURE: 151 MMHG | WEIGHT: 184 LBS | HEART RATE: 61 BPM | DIASTOLIC BLOOD PRESSURE: 111 MMHG | HEIGHT: 67 IN

## 2019-03-12 DIAGNOSIS — M17.12 PRIMARY OSTEOARTHRITIS OF LEFT KNEE: Primary | ICD-10-CM

## 2019-03-12 PROCEDURE — 99212 OFFICE O/P EST SF 10 MIN: CPT | Performed by: PHYSICIAN ASSISTANT

## 2019-03-12 PROCEDURE — 20610 DRAIN/INJ JOINT/BURSA W/O US: CPT | Performed by: PHYSICIAN ASSISTANT

## 2019-03-12 RX ADMIN — BETAMETHASONE SODIUM PHOSPHATE AND BETAMETHASONE ACETATE 6 MG: 3; 3 INJECTION, SUSPENSION INTRA-ARTICULAR; INTRALESIONAL; INTRAMUSCULAR; SOFT TISSUE at 14:00

## 2019-03-12 RX ADMIN — BUPIVACAINE HYDROCHLORIDE 2 ML: 2.5 INJECTION, SOLUTION INFILTRATION; PERINEURAL at 14:00

## 2019-03-12 RX ADMIN — LIDOCAINE HYDROCHLORIDE 2 ML: 10 INJECTION, SOLUTION INFILTRATION; PERINEURAL at 14:00

## 2019-04-10 ENCOUNTER — TELEPHONE (OUTPATIENT)
Dept: OBGYN CLINIC | Facility: HOSPITAL | Age: 49
End: 2019-04-10

## 2019-04-11 ENCOUNTER — HOSPITAL ENCOUNTER (OUTPATIENT)
Dept: RADIOLOGY | Facility: HOSPITAL | Age: 49
Discharge: HOME | End: 2019-04-11
Payer: COMMERCIAL

## 2019-04-11 ENCOUNTER — OFFICE VISIT (OUTPATIENT)
Dept: OBGYN CLINIC | Facility: HOSPITAL | Age: 49
End: 2019-04-11

## 2019-04-11 VITALS
WEIGHT: 183.4 LBS | SYSTOLIC BLOOD PRESSURE: 157 MMHG | DIASTOLIC BLOOD PRESSURE: 116 MMHG | BODY MASS INDEX: 28.79 KG/M2 | HEIGHT: 67 IN | HEART RATE: 66 BPM

## 2019-04-11 DIAGNOSIS — M25.522 PAIN IN LEFT ELBOW: Primary | ICD-10-CM

## 2019-04-11 DIAGNOSIS — M25.522 PAIN IN LEFT ELBOW: ICD-10-CM

## 2019-04-11 DIAGNOSIS — M77.12 LATERAL EPICONDYLITIS OF LEFT ELBOW: ICD-10-CM

## 2019-04-11 PROBLEM — M17.12 PRIMARY OSTEOARTHRITIS OF LEFT KNEE: Status: ACTIVE | Noted: 2019-04-11

## 2019-04-11 PROCEDURE — 73080 X-RAY EXAM OF ELBOW: CPT

## 2019-04-11 PROCEDURE — 99024 POSTOP FOLLOW-UP VISIT: CPT | Performed by: PHYSICIAN ASSISTANT

## 2019-04-11 RX ORDER — BUPIVACAINE HYDROCHLORIDE 2.5 MG/ML
2 INJECTION, SOLUTION INFILTRATION; PERINEURAL
Status: COMPLETED | OUTPATIENT
Start: 2019-03-12 | End: 2019-03-12

## 2019-04-11 RX ORDER — BETAMETHASONE SODIUM PHOSPHATE AND BETAMETHASONE ACETATE 3; 3 MG/ML; MG/ML
6 INJECTION, SUSPENSION INTRA-ARTICULAR; INTRALESIONAL; INTRAMUSCULAR; SOFT TISSUE
Status: COMPLETED | OUTPATIENT
Start: 2019-03-12 | End: 2019-03-12

## 2019-04-11 RX ORDER — LIDOCAINE HYDROCHLORIDE 10 MG/ML
2 INJECTION, SOLUTION INFILTRATION; PERINEURAL
Status: COMPLETED | OUTPATIENT
Start: 2019-03-12 | End: 2019-03-12

## 2019-04-18 ENCOUNTER — TRANSCRIBE ORDERS (OUTPATIENT)
Dept: RADIOLOGY | Facility: HOSPITAL | Age: 49
End: 2019-04-18

## 2019-04-18 ENCOUNTER — HOSPITAL ENCOUNTER (OUTPATIENT)
Dept: RADIOLOGY | Facility: HOSPITAL | Age: 49
Discharge: HOME | End: 2019-04-18
Payer: COMMERCIAL

## 2019-04-18 DIAGNOSIS — M77.12 LATERAL EPICONDYLITIS OF LEFT ELBOW: ICD-10-CM

## 2019-04-18 PROCEDURE — 76882 US LMTD JT/FCL EVL NVASC XTR: CPT

## 2019-04-19 ENCOUNTER — HOSPITAL ENCOUNTER (EMERGENCY)
Facility: HOSPITAL | Age: 49
Discharge: HOME | End: 2019-04-19
Attending: EMERGENCY MEDICINE | Admitting: EMERGENCY MEDICINE
Payer: COMMERCIAL

## 2019-04-19 ENCOUNTER — TELEPHONE (OUTPATIENT)
Dept: OBGYN CLINIC | Facility: HOSPITAL | Age: 49
End: 2019-04-19

## 2019-04-19 ENCOUNTER — APPOINTMENT (EMERGENCY)
Dept: RADIOLOGY | Facility: HOSPITAL | Age: 49
End: 2019-04-19
Payer: COMMERCIAL

## 2019-04-19 VITALS
TEMPERATURE: 98.1 F | WEIGHT: 183 LBS | OXYGEN SATURATION: 99 % | HEIGHT: 67 IN | DIASTOLIC BLOOD PRESSURE: 100 MMHG | SYSTOLIC BLOOD PRESSURE: 200 MMHG | BODY MASS INDEX: 28.72 KG/M2 | RESPIRATION RATE: 17 BRPM | HEART RATE: 74 BPM

## 2019-04-19 DIAGNOSIS — S83.92XA LEFT KNEE SPRAIN: Primary | ICD-10-CM

## 2019-04-19 PROCEDURE — 99283 EMERGENCY DEPT VISIT LOW MDM: CPT | Performed by: PHYSICIAN ASSISTANT

## 2019-04-19 PROCEDURE — 73564 X-RAY EXAM KNEE 4 OR MORE: CPT

## 2019-04-19 PROCEDURE — 96372 THER/PROPH/DIAG INJ SC/IM: CPT

## 2019-04-19 PROCEDURE — 99283 EMERGENCY DEPT VISIT LOW MDM: CPT

## 2019-04-19 RX ORDER — KETOROLAC TROMETHAMINE 30 MG/ML
15 INJECTION, SOLUTION INTRAMUSCULAR; INTRAVENOUS ONCE
Status: COMPLETED | OUTPATIENT
Start: 2019-04-19 | End: 2019-04-19

## 2019-04-19 RX ADMIN — KETOROLAC TROMETHAMINE 15 MG: 30 INJECTION, SOLUTION INTRAMUSCULAR at 11:05

## 2019-04-23 ENCOUNTER — OFFICE VISIT (OUTPATIENT)
Dept: OBGYN CLINIC | Facility: HOSPITAL | Age: 49
End: 2019-04-23
Payer: COMMERCIAL

## 2019-04-23 VITALS
DIASTOLIC BLOOD PRESSURE: 98 MMHG | BODY MASS INDEX: 28.25 KG/M2 | HEART RATE: 86 BPM | HEIGHT: 67 IN | SYSTOLIC BLOOD PRESSURE: 172 MMHG | WEIGHT: 180 LBS

## 2019-04-23 DIAGNOSIS — M76.32 IT BAND SYNDROME, LEFT: Primary | ICD-10-CM

## 2019-04-23 DIAGNOSIS — M76.61 TENDONITIS, ACHILLES, RIGHT: ICD-10-CM

## 2019-04-23 PROCEDURE — 99213 OFFICE O/P EST LOW 20 MIN: CPT | Performed by: ORTHOPAEDIC SURGERY

## 2019-04-23 RX ORDER — DEXAMETHASONE SODIUM PHOSPHATE 4 MG/ML
4 INJECTION, SOLUTION INTRA-ARTICULAR; INTRALESIONAL; INTRAMUSCULAR; INTRAVENOUS; SOFT TISSUE 3 TIMES WEEKLY
Qty: 30 ML | Refills: 1 | Status: SHIPPED | OUTPATIENT
Start: 2019-04-24 | End: 2019-07-09 | Stop reason: SDUPTHER

## 2019-04-24 ENCOUNTER — OFFICE VISIT (OUTPATIENT)
Dept: OBGYN CLINIC | Facility: HOSPITAL | Age: 49
End: 2019-04-24

## 2019-04-24 VITALS
SYSTOLIC BLOOD PRESSURE: 133 MMHG | BODY MASS INDEX: 28.85 KG/M2 | HEIGHT: 67 IN | DIASTOLIC BLOOD PRESSURE: 86 MMHG | WEIGHT: 183.8 LBS | HEART RATE: 84 BPM

## 2019-04-24 DIAGNOSIS — M77.12 LATERAL EPICONDYLITIS OF LEFT ELBOW: Primary | ICD-10-CM

## 2019-04-24 PROCEDURE — 99024 POSTOP FOLLOW-UP VISIT: CPT | Performed by: ORTHOPAEDIC SURGERY

## 2019-04-29 ENCOUNTER — EVALUATION (OUTPATIENT)
Dept: PHYSICAL THERAPY | Facility: CLINIC | Age: 49
End: 2019-04-29
Payer: COMMERCIAL

## 2019-04-29 DIAGNOSIS — M76.32 IT BAND SYNDROME, LEFT: ICD-10-CM

## 2019-04-29 DIAGNOSIS — M76.61 TENDONITIS, ACHILLES, RIGHT: Primary | ICD-10-CM

## 2019-04-29 PROCEDURE — 97162 PT EVAL MOD COMPLEX 30 MIN: CPT | Performed by: PHYSICAL THERAPIST

## 2019-04-30 ENCOUNTER — EVALUATION (OUTPATIENT)
Dept: OCCUPATIONAL THERAPY | Facility: CLINIC | Age: 49
End: 2019-04-30
Payer: COMMERCIAL

## 2019-04-30 DIAGNOSIS — M77.12 LATERAL EPICONDYLITIS OF LEFT ELBOW: ICD-10-CM

## 2019-04-30 PROCEDURE — 97165 OT EVAL LOW COMPLEX 30 MIN: CPT | Performed by: OCCUPATIONAL THERAPIST

## 2019-05-06 ENCOUNTER — OFFICE VISIT (OUTPATIENT)
Dept: PHYSICAL THERAPY | Facility: CLINIC | Age: 49
End: 2019-05-06
Payer: COMMERCIAL

## 2019-05-06 ENCOUNTER — OFFICE VISIT (OUTPATIENT)
Dept: OCCUPATIONAL THERAPY | Facility: CLINIC | Age: 49
End: 2019-05-06
Payer: COMMERCIAL

## 2019-05-06 DIAGNOSIS — M77.12 LATERAL EPICONDYLITIS OF LEFT ELBOW: Primary | ICD-10-CM

## 2019-05-06 DIAGNOSIS — M76.61 TENDONITIS, ACHILLES, RIGHT: ICD-10-CM

## 2019-05-06 DIAGNOSIS — M76.32 IT BAND SYNDROME, LEFT: Primary | ICD-10-CM

## 2019-05-06 PROCEDURE — 97110 THERAPEUTIC EXERCISES: CPT

## 2019-05-06 PROCEDURE — 97140 MANUAL THERAPY 1/> REGIONS: CPT | Performed by: PHYSICAL THERAPIST

## 2019-05-06 PROCEDURE — 97110 THERAPEUTIC EXERCISES: CPT | Performed by: PHYSICAL THERAPIST

## 2019-05-06 PROCEDURE — 97112 NEUROMUSCULAR REEDUCATION: CPT | Performed by: PHYSICAL THERAPIST

## 2019-05-06 PROCEDURE — 97140 MANUAL THERAPY 1/> REGIONS: CPT

## 2019-05-08 ENCOUNTER — APPOINTMENT (OUTPATIENT)
Dept: PHYSICAL THERAPY | Facility: CLINIC | Age: 49
End: 2019-05-08
Payer: COMMERCIAL

## 2019-05-08 ENCOUNTER — APPOINTMENT (OUTPATIENT)
Dept: OCCUPATIONAL THERAPY | Facility: CLINIC | Age: 49
End: 2019-05-08
Payer: COMMERCIAL

## 2019-05-13 ENCOUNTER — OFFICE VISIT (OUTPATIENT)
Dept: PHYSICAL THERAPY | Facility: CLINIC | Age: 49
End: 2019-05-13
Payer: COMMERCIAL

## 2019-05-13 ENCOUNTER — OFFICE VISIT (OUTPATIENT)
Dept: OCCUPATIONAL THERAPY | Facility: CLINIC | Age: 49
End: 2019-05-13
Payer: COMMERCIAL

## 2019-05-13 DIAGNOSIS — M76.61 TENDONITIS, ACHILLES, RIGHT: ICD-10-CM

## 2019-05-13 DIAGNOSIS — M76.32 IT BAND SYNDROME, LEFT: Primary | ICD-10-CM

## 2019-05-13 DIAGNOSIS — M77.12 LATERAL EPICONDYLITIS OF LEFT ELBOW: Primary | ICD-10-CM

## 2019-05-13 PROCEDURE — 97110 THERAPEUTIC EXERCISES: CPT | Performed by: OCCUPATIONAL THERAPIST

## 2019-05-13 PROCEDURE — 97140 MANUAL THERAPY 1/> REGIONS: CPT | Performed by: OCCUPATIONAL THERAPIST

## 2019-05-13 PROCEDURE — 97140 MANUAL THERAPY 1/> REGIONS: CPT

## 2019-05-13 PROCEDURE — 97110 THERAPEUTIC EXERCISES: CPT

## 2019-05-13 PROCEDURE — 97112 NEUROMUSCULAR REEDUCATION: CPT

## 2019-05-15 ENCOUNTER — OFFICE VISIT (OUTPATIENT)
Dept: OCCUPATIONAL THERAPY | Facility: CLINIC | Age: 49
End: 2019-05-15
Payer: COMMERCIAL

## 2019-05-15 ENCOUNTER — OFFICE VISIT (OUTPATIENT)
Dept: PHYSICAL THERAPY | Facility: CLINIC | Age: 49
End: 2019-05-15
Payer: COMMERCIAL

## 2019-05-15 DIAGNOSIS — M76.32 IT BAND SYNDROME, LEFT: Primary | ICD-10-CM

## 2019-05-15 DIAGNOSIS — M76.61 TENDONITIS, ACHILLES, RIGHT: ICD-10-CM

## 2019-05-15 DIAGNOSIS — M77.12 LATERAL EPICONDYLITIS OF LEFT ELBOW: Primary | ICD-10-CM

## 2019-05-15 PROCEDURE — 97110 THERAPEUTIC EXERCISES: CPT

## 2019-05-15 PROCEDURE — 97010 HOT OR COLD PACKS THERAPY: CPT

## 2019-05-15 PROCEDURE — 97140 MANUAL THERAPY 1/> REGIONS: CPT

## 2019-05-16 ENCOUNTER — OFFICE VISIT (OUTPATIENT)
Dept: OBGYN CLINIC | Facility: HOSPITAL | Age: 49
End: 2019-05-16
Payer: COMMERCIAL

## 2019-05-16 VITALS
HEIGHT: 67 IN | DIASTOLIC BLOOD PRESSURE: 104 MMHG | HEART RATE: 60 BPM | WEIGHT: 180 LBS | BODY MASS INDEX: 28.25 KG/M2 | SYSTOLIC BLOOD PRESSURE: 155 MMHG

## 2019-05-16 DIAGNOSIS — M76.32 IT BAND SYNDROME, LEFT: Primary | ICD-10-CM

## 2019-05-16 PROCEDURE — 99213 OFFICE O/P EST LOW 20 MIN: CPT | Performed by: ORTHOPAEDIC SURGERY

## 2019-05-17 ENCOUNTER — HOSPITAL ENCOUNTER (EMERGENCY)
Facility: HOSPITAL | Age: 49
Discharge: HOME | End: 2019-05-17
Attending: EMERGENCY MEDICINE | Admitting: EMERGENCY MEDICINE
Payer: COMMERCIAL

## 2019-05-17 VITALS
SYSTOLIC BLOOD PRESSURE: 191 MMHG | OXYGEN SATURATION: 99 % | RESPIRATION RATE: 24 BRPM | DIASTOLIC BLOOD PRESSURE: 121 MMHG | HEART RATE: 77 BPM | TEMPERATURE: 97.5 F

## 2019-05-17 DIAGNOSIS — T30.0 FIRST DEGREE BURN INJURY: Primary | ICD-10-CM

## 2019-05-17 PROCEDURE — 99283 EMERGENCY DEPT VISIT LOW MDM: CPT | Performed by: EMERGENCY MEDICINE

## 2019-05-17 PROCEDURE — 99283 EMERGENCY DEPT VISIT LOW MDM: CPT

## 2019-05-17 PROCEDURE — 90471 IMMUNIZATION ADMIN: CPT

## 2019-05-17 PROCEDURE — 96372 THER/PROPH/DIAG INJ SC/IM: CPT

## 2019-05-17 PROCEDURE — 90715 TDAP VACCINE 7 YRS/> IM: CPT | Performed by: EMERGENCY MEDICINE

## 2019-05-17 RX ORDER — ACETAMINOPHEN 325 MG/1
975 TABLET ORAL ONCE
Status: COMPLETED | OUTPATIENT
Start: 2019-05-17 | End: 2019-05-17

## 2019-05-17 RX ORDER — GINSENG 100 MG
1 CAPSULE ORAL ONCE
Status: COMPLETED | OUTPATIENT
Start: 2019-05-17 | End: 2019-05-17

## 2019-05-17 RX ORDER — KETOROLAC TROMETHAMINE 30 MG/ML
15 INJECTION, SOLUTION INTRAMUSCULAR; INTRAVENOUS ONCE
Status: COMPLETED | OUTPATIENT
Start: 2019-05-17 | End: 2019-05-17

## 2019-05-17 RX ADMIN — BACITRACIN ZINC 1 LARGE APPLICATION: 500 OINTMENT TOPICAL at 19:28

## 2019-05-17 RX ADMIN — TETANUS TOXOID, REDUCED DIPHTHERIA TOXOID AND ACELLULAR PERTUSSIS VACCINE, ADSORBED 0.5 ML: 5; 2.5; 8; 8; 2.5 SUSPENSION INTRAMUSCULAR at 19:12

## 2019-05-17 RX ADMIN — KETOROLAC TROMETHAMINE 15 MG: 30 INJECTION, SOLUTION INTRAMUSCULAR at 19:12

## 2019-05-17 RX ADMIN — ACETAMINOPHEN 975 MG: 325 TABLET, FILM COATED ORAL at 19:12

## 2019-05-20 ENCOUNTER — OFFICE VISIT (OUTPATIENT)
Dept: OCCUPATIONAL THERAPY | Facility: CLINIC | Age: 49
End: 2019-05-20
Payer: COMMERCIAL

## 2019-05-20 ENCOUNTER — OFFICE VISIT (OUTPATIENT)
Dept: PHYSICAL THERAPY | Facility: CLINIC | Age: 49
End: 2019-05-20
Payer: COMMERCIAL

## 2019-05-20 DIAGNOSIS — M77.12 LATERAL EPICONDYLITIS OF LEFT ELBOW: Primary | ICD-10-CM

## 2019-05-20 DIAGNOSIS — M76.32 IT BAND SYNDROME, LEFT: Primary | ICD-10-CM

## 2019-05-20 DIAGNOSIS — M76.61 TENDONITIS, ACHILLES, RIGHT: ICD-10-CM

## 2019-05-20 PROCEDURE — 97110 THERAPEUTIC EXERCISES: CPT | Performed by: PHYSICAL THERAPIST

## 2019-05-20 PROCEDURE — 97140 MANUAL THERAPY 1/> REGIONS: CPT | Performed by: OCCUPATIONAL THERAPIST

## 2019-05-20 PROCEDURE — 97140 MANUAL THERAPY 1/> REGIONS: CPT | Performed by: PHYSICAL THERAPIST

## 2019-05-20 PROCEDURE — 97110 THERAPEUTIC EXERCISES: CPT | Performed by: OCCUPATIONAL THERAPIST

## 2019-05-22 ENCOUNTER — EVALUATION (OUTPATIENT)
Dept: PHYSICAL THERAPY | Facility: CLINIC | Age: 49
End: 2019-05-22
Payer: COMMERCIAL

## 2019-05-22 ENCOUNTER — OFFICE VISIT (OUTPATIENT)
Dept: OCCUPATIONAL THERAPY | Facility: CLINIC | Age: 49
End: 2019-05-22
Payer: COMMERCIAL

## 2019-05-22 DIAGNOSIS — M77.12 LATERAL EPICONDYLITIS OF LEFT ELBOW: Primary | ICD-10-CM

## 2019-05-22 DIAGNOSIS — M76.61 TENDONITIS, ACHILLES, RIGHT: ICD-10-CM

## 2019-05-22 DIAGNOSIS — M76.32 IT BAND SYNDROME, LEFT: Primary | ICD-10-CM

## 2019-05-22 PROCEDURE — 97140 MANUAL THERAPY 1/> REGIONS: CPT

## 2019-05-22 PROCEDURE — 97110 THERAPEUTIC EXERCISES: CPT

## 2019-05-22 PROCEDURE — 97140 MANUAL THERAPY 1/> REGIONS: CPT | Performed by: OCCUPATIONAL THERAPIST

## 2019-05-22 PROCEDURE — 97110 THERAPEUTIC EXERCISES: CPT | Performed by: OCCUPATIONAL THERAPIST

## 2019-05-29 ENCOUNTER — APPOINTMENT (OUTPATIENT)
Dept: PHYSICAL THERAPY | Facility: CLINIC | Age: 49
End: 2019-05-29
Payer: COMMERCIAL

## 2019-05-29 ENCOUNTER — OFFICE VISIT (OUTPATIENT)
Dept: OCCUPATIONAL THERAPY | Facility: CLINIC | Age: 49
End: 2019-05-29
Payer: COMMERCIAL

## 2019-05-29 DIAGNOSIS — M77.12 LATERAL EPICONDYLITIS OF LEFT ELBOW: Primary | ICD-10-CM

## 2019-05-29 PROCEDURE — 97140 MANUAL THERAPY 1/> REGIONS: CPT | Performed by: OCCUPATIONAL THERAPIST

## 2019-05-29 PROCEDURE — 97110 THERAPEUTIC EXERCISES: CPT | Performed by: OCCUPATIONAL THERAPIST

## 2019-06-07 ENCOUNTER — OFFICE VISIT (OUTPATIENT)
Dept: OBGYN CLINIC | Facility: HOSPITAL | Age: 49
End: 2019-06-07
Payer: COMMERCIAL

## 2019-06-07 VITALS
WEIGHT: 183.6 LBS | DIASTOLIC BLOOD PRESSURE: 105 MMHG | HEIGHT: 67 IN | BODY MASS INDEX: 28.82 KG/M2 | SYSTOLIC BLOOD PRESSURE: 192 MMHG | HEART RATE: 76 BPM

## 2019-06-07 DIAGNOSIS — Z47.89 AFTERCARE FOLLOWING SURGERY OF THE MUSCULOSKELETAL SYSTEM: Primary | ICD-10-CM

## 2019-06-07 PROCEDURE — 99213 OFFICE O/P EST LOW 20 MIN: CPT | Performed by: ORTHOPAEDIC SURGERY

## 2019-06-18 ENCOUNTER — OFFICE VISIT (OUTPATIENT)
Dept: OBGYN CLINIC | Facility: HOSPITAL | Age: 49
End: 2019-06-18
Payer: COMMERCIAL

## 2019-06-18 VITALS
BODY MASS INDEX: 28.25 KG/M2 | WEIGHT: 180 LBS | DIASTOLIC BLOOD PRESSURE: 100 MMHG | SYSTOLIC BLOOD PRESSURE: 152 MMHG | HEIGHT: 67 IN | HEART RATE: 63 BPM

## 2019-06-18 DIAGNOSIS — M25.562 LEFT KNEE PAIN, UNSPECIFIED CHRONICITY: Primary | ICD-10-CM

## 2019-06-18 PROCEDURE — 99213 OFFICE O/P EST LOW 20 MIN: CPT | Performed by: ORTHOPAEDIC SURGERY

## 2019-06-18 PROCEDURE — 20610 DRAIN/INJ JOINT/BURSA W/O US: CPT | Performed by: PHYSICIAN ASSISTANT

## 2019-06-18 PROCEDURE — S0020 INJECTION, BUPIVICAINE HYDRO: HCPCS | Performed by: PHYSICIAN ASSISTANT

## 2019-06-18 RX ADMIN — BUPIVACAINE HYDROCHLORIDE 2 ML: 2.5 INJECTION, SOLUTION INFILTRATION; PERINEURAL at 18:24

## 2019-06-18 RX ADMIN — BETAMETHASONE SODIUM PHOSPHATE AND BETAMETHASONE ACETATE 6 MG: 3; 3 INJECTION, SUSPENSION INTRA-ARTICULAR; INTRALESIONAL; INTRAMUSCULAR; SOFT TISSUE at 18:24

## 2019-06-18 RX ADMIN — LIDOCAINE HYDROCHLORIDE 2 ML: 10 INJECTION, SOLUTION INFILTRATION; PERINEURAL at 18:24

## 2019-06-22 ENCOUNTER — HOSPITAL ENCOUNTER (OUTPATIENT)
Dept: RADIOLOGY | Facility: HOSPITAL | Age: 49
Discharge: HOME | End: 2019-06-22
Payer: COMMERCIAL

## 2019-06-22 DIAGNOSIS — M25.562 LEFT KNEE PAIN, UNSPECIFIED CHRONICITY: ICD-10-CM

## 2019-06-22 PROCEDURE — 73721 MRI JNT OF LWR EXTRE W/O DYE: CPT

## 2019-06-24 ENCOUNTER — TELEPHONE (OUTPATIENT)
Dept: OBGYN CLINIC | Facility: HOSPITAL | Age: 49
End: 2019-06-24

## 2019-06-27 ENCOUNTER — TELEPHONE (OUTPATIENT)
Dept: OBGYN CLINIC | Facility: HOSPITAL | Age: 49
End: 2019-06-27

## 2019-07-09 ENCOUNTER — OFFICE VISIT (OUTPATIENT)
Dept: OBGYN CLINIC | Facility: HOSPITAL | Age: 49
End: 2019-07-09
Payer: COMMERCIAL

## 2019-07-09 VITALS
HEIGHT: 67 IN | BODY MASS INDEX: 28.25 KG/M2 | WEIGHT: 180 LBS | HEART RATE: 74 BPM | SYSTOLIC BLOOD PRESSURE: 159 MMHG | DIASTOLIC BLOOD PRESSURE: 98 MMHG

## 2019-07-09 DIAGNOSIS — M76.899 BICEPS FEMORIS TENDONITIS: ICD-10-CM

## 2019-07-09 DIAGNOSIS — M17.12 PRIMARY OSTEOARTHRITIS OF LEFT KNEE: Primary | ICD-10-CM

## 2019-07-09 DIAGNOSIS — M76.61 TENDONITIS, ACHILLES, RIGHT: ICD-10-CM

## 2019-07-09 DIAGNOSIS — M76.32 IT BAND SYNDROME, LEFT: ICD-10-CM

## 2019-07-09 DIAGNOSIS — M25.562 LEFT KNEE PAIN, UNSPECIFIED CHRONICITY: ICD-10-CM

## 2019-07-09 PROCEDURE — 99213 OFFICE O/P EST LOW 20 MIN: CPT | Performed by: ORTHOPAEDIC SURGERY

## 2019-07-09 RX ORDER — DEXAMETHASONE SODIUM PHOSPHATE 4 MG/ML
4 INJECTION, SOLUTION INTRA-ARTICULAR; INTRALESIONAL; INTRAMUSCULAR; INTRAVENOUS; SOFT TISSUE 3 TIMES WEEKLY
Qty: 30 ML | Refills: 1 | Status: SHIPPED | OUTPATIENT
Start: 2019-07-10 | End: 2021-06-15

## 2019-07-09 NOTE — PROGRESS NOTES
51 y o male presents to the office for follow up of left knee pain  He is here today to review the results of his left knee MRI  He is experiencing lateral knee pain made worse with resisted knee extension  He has gotten relief from previous visco injections  He denies any numbness or tingling      Review of Systems  Review of systems negative unless otherwise specified in HPI    Past Medical History  Past Medical History:   Diagnosis Date    Chronic back pain     Fibromyalgia     Hypertension     RA (rheumatoid arthritis) ([address] )        Past Surgical History  Past Surgical History:   Procedure Laterality Date    CARPAL TUNNEL RELEASE Left     LATERAL EPICONDYLE RELEASE Right 2/25/2016    Procedure: RELEASE EPICONDYLAR ELBOW, LATERAL;  Surgeon: Angel Gentile MD;  [facility];  Service:     NV REPAIR ACHILLES TENDON,PRIMARY Left 4/2/2018    Procedure: REPAIR TENDON ACHILLES;  Surgeon: David Childress MD;  [facility];  Service: Orthopedics    NV TENOTOMY ELBOW LATERAL/MEDIAL DEBRIDE OPEN Left 2/12/2019    Procedure: RELEASE EPICONDYLAR ELBOW;  Surgeon: Angel Gentile MD;  [facility];  Service: Orthopedics    ROTATOR CUFF REPAIR Left     ULNAR TUNNEL RELEASE Left     x2       Current Medications  Current Outpatient Medications on File Prior to Visit   Medication Sig Dispense Refill    amLODIPine (NORVASC) 10 mg tablet Take 10 mg by mouth every evening        Butalbital-APAP-Caffeine (FIORICET PO) Take 1 tablet by mouth daily as needed Unknown dose        levothyroxine 25 mcg tablet   0    Losartan Potassium (COZAAR PO) Take 100 mg by mouth daily        meloxicam (MOBIC) 15 mg tablet Take 15 mg by mouth      methocarbamol (ROBAXIN) 750 mg tablet Take 750 mg by mouth 4 (four) times a day      metoprolol succinate (TOPROL-XL) 50 mg 24 hr tablet "I take one a day bedtime"      traMADol (ULTRAM) 50 mg tablet Take 50 mg by mouth every 6 (six) hours as needed for moderate pain      [DISCONTINUED] dexamethasone (DECADRON) 4 mg/mL 1 mL (4 mg total) by Iontophoresis route 3 (three) times a week 30 mL 1     No current facility-administered medications on file prior to visit  [facility]  0   Lab Value Date/Time    HCT 48 0 02/09/2018 2239    HCT 47 8 10/27/2015 1646    HGB 17 1 (H) 02/09/2018 2239    HGB 17 0 10/27/2015 1646    WBC 5 33 02/09/2018 2239    WBC 10 38 (H) 10/27/2015 1646    INR 1 04 04/02/2018 0745    INR 0 99 10/27/2015 1646    GLUCOSE 91 10/27/2015 1646         Physical exam  · General: Awake, Alert, Oriented  · Eyes: Pupils equal, round and reactive to light  · Heart: regular rate and rhythm  · Lungs: No audible wheezing  · Abdomen: soft  Left knee  · Patient ambulates without assistance  · Tender to palpation over lateral fibular head and lateral patellar boarder  · Full ROM  · Strength 5/5  · Stable to valgus and varus stresses      Imaging  Images were personally reviewed with the patient    MRI of left knee 06/22/2019 was reviewed and shows biceps femoris tendinosis      Assessment/Plan:   49 y o male with left biceps femoris tendonitis of left knee will restart physical therapy  Iontophoresis is again ordered for biceps femoris  Prior authorization ordered for synvisc 3 injection series  We will see him back once authorization is approved      Scribe Attestation    I,:   Obdulia Velazquez am acting as a scribe while in the presence of the attending physician :        I,:   Kristen Combs MD personally performed the services described in this documentation    as scribed in my presence :

## 2019-07-31 ENCOUNTER — HOSPITAL ENCOUNTER (OUTPATIENT)
Dept: RADIOLOGY | Facility: HOSPITAL | Age: 49
Discharge: HOME | End: 2019-07-31
Attending: ORTHOPAEDIC SURGERY
Payer: COMMERCIAL

## 2019-07-31 ENCOUNTER — OFFICE VISIT (OUTPATIENT)
Dept: OBGYN CLINIC | Facility: HOSPITAL | Age: 49
End: 2019-07-31
Payer: COMMERCIAL

## 2019-07-31 VITALS
HEIGHT: 67 IN | HEART RATE: 61 BPM | DIASTOLIC BLOOD PRESSURE: 102 MMHG | BODY MASS INDEX: 28.94 KG/M2 | WEIGHT: 184.4 LBS | SYSTOLIC BLOOD PRESSURE: 163 MMHG

## 2019-07-31 DIAGNOSIS — M19.032 CMC DJD(CARPOMETACARPAL DEGENERATIVE JOINT DISEASE), LOCALIZED PRIMARY, LEFT: Primary | ICD-10-CM

## 2019-07-31 DIAGNOSIS — R52 PAIN: ICD-10-CM

## 2019-07-31 PROCEDURE — 73140 X-RAY EXAM OF FINGER(S): CPT

## 2019-07-31 PROCEDURE — 20550 NJX 1 TENDON SHEATH/LIGAMENT: CPT | Performed by: ORTHOPAEDIC SURGERY

## 2019-07-31 PROCEDURE — 99213 OFFICE O/P EST LOW 20 MIN: CPT | Performed by: ORTHOPAEDIC SURGERY

## 2019-07-31 RX ORDER — LIDOCAINE HYDROCHLORIDE 20 MG/ML
0.5 INJECTION, SOLUTION EPIDURAL; INFILTRATION; INTRACAUDAL; PERINEURAL
Status: COMPLETED | OUTPATIENT
Start: 2019-07-31 | End: 2019-07-31

## 2019-07-31 RX ORDER — BETAMETHASONE SODIUM PHOSPHATE AND BETAMETHASONE ACETATE 3; 3 MG/ML; MG/ML
3 INJECTION, SUSPENSION INTRA-ARTICULAR; INTRALESIONAL; INTRAMUSCULAR; SOFT TISSUE
Status: COMPLETED | OUTPATIENT
Start: 2019-07-31 | End: 2019-07-31

## 2019-07-31 RX ADMIN — BETAMETHASONE SODIUM PHOSPHATE AND BETAMETHASONE ACETATE 3 MG: 3; 3 INJECTION, SUSPENSION INTRA-ARTICULAR; INTRALESIONAL; INTRAMUSCULAR; SOFT TISSUE at 10:43

## 2019-07-31 RX ADMIN — LIDOCAINE HYDROCHLORIDE 0.5 ML: 20 INJECTION, SOLUTION EPIDURAL; INFILTRATION; INTRACAUDAL; PERINEURAL at 10:43

## 2019-07-31 NOTE — PROGRESS NOTES
ASSESSMENT/PLAN:    Assessment:     Status post left epicondylar release, 02/12/2019  L thumb CMC pain    Plan:   Patient educated that with a diagnosis of RA, chronic pain is expected unfortunately  No concerns regarding his previous surgery  For the thumb, we will inject this today and continue to use his brace as needed     Follow Up:  PRN    General Discussions:  [facility] Arthritis: The anatomy and physiology of carpometacarpal joint arthritis was discussed with the patient today in the office  Deterioration of the articular cartilage eventually leads to hypermobility at the thumb [facility] joint, resulting in joint subluxation, osteophyte formation, cystic changes within the trapezium and base of the first metacarpal, as well as subchondral sclerosis  Eventually, pain, limited mobility, and compensatory hyperextension at the metacarpophalangeal joint may develop  While normal activity and usage of the thumb joint may provide a painful experience to the patient, this typically does not result in damage to the thumb or hand  Treatment options include resting thumb spica splints to decreased joint edema, pain, and inflammation  Therapy exercises to strengthen the thenar musculature may relieve pain, but do not alter the overall continued development of osteoarthritis  Oral medications, topical medications, corticosteroid injections may decrease pain and increase overall function  Eventually, approximately 5% of patients may require surgical intervention  _____________________________________________________  CHIEF COMPLAINT:  Chief Complaint   Patient presents with    Left Elbow - Follow-up         SUBJECTIVE:  Jose Beck is a 51 y o  male who presents for follow up status post left epicondylar release, 02/12/2019   Patient states overall the muscular pain has improved, but he still describes feeling like the elbow gets stuck when he's had it in a flexed position for too long and has trouble extending it  He is also c/o L thumb pain, worse with gripping such as using his brakes on his bike  Lastly, describes n/t intermittently in the ring and small fingers       PAST MEDICAL HISTORY:  Past Medical History:   Diagnosis Date    Chronic back pain     Fibromyalgia     Hypertension     RA (rheumatoid arthritis) ([facility] [address] )        PAST SURGICAL HISTORY:  Past Surgical History:   Procedure Laterality Date    CARPAL TUNNEL RELEASE Left     LATERAL EPICONDYLE RELEASE Right 2/25/2016    Procedure: RELEASE EPICONDYLAR ELBOW, LATERAL;  Surgeon: Gita Cantu MD;  [facility];  Service:     WI REPAIR ACHILLES TENDON,PRIMARY Left 4/2/2018    Procedure: REPAIR TENDON ACHILLES;  Surgeon: Verlee Carrel, MD;  [facility];  Service: Orthopedics    WI TENOTOMY ELBOW LATERAL/MEDIAL DEBRIDE OPEN Left 2/12/2019    Procedure: RELEASE EPICONDYLAR ELBOW;  Surgeon: Gita Cantu MD;  [facility];  Service: Orthopedics    ROTATOR CUFF REPAIR Left     ULNAR TUNNEL RELEASE Left     x2       FAMILY HISTORY:  Family History   Problem Relation Age of Onset    Hypertension Mother     Diabetes Mother        SOCIAL HISTORY:  Social History     Tobacco Use    Smoking status: Never Smoker    Smokeless tobacco: Never Used   Substance Use Topics    Alcohol use: No    Drug use: No       MEDICATIONS:    Current Outpatient Medications:     amLODIPine (NORVASC) 10 mg tablet, Take 10 mg by mouth every evening  , Disp: , Rfl:     Butalbital-APAP-Caffeine (FIORICET PO), Take 1 tablet by mouth daily as needed Unknown dose  , Disp: , Rfl:     dexamethasone (DECADRON) 4 mg/mL, 1 mL (4 mg total) by Iontophoresis route 3 (three) times a week, Disp: 30 mL, Rfl: 1    levothyroxine 25 mcg tablet, , Disp: , Rfl: 0    Losartan Potassium (COZAAR PO), Take 100 mg by mouth daily  , Disp: , Rfl:     meloxicam (MOBIC) 15 mg tablet, Take 15 mg by mouth, Disp: , Rfl:     methocarbamol (ROBAXIN) 750 mg tablet, Take 750 mg by mouth 4 (four) times a day, Disp: , Rfl:     metoprolol succinate (TOPROL-XL) 50 mg 24 hr tablet, "I take one a day bedtime", Disp: , Rfl:     traMADol (ULTRAM) 50 mg tablet, Take 50 mg by mouth every 6 (six) hours as needed for moderate pain, Disp: , Rfl:     ALLERGIES:  Allergies   Allergen Reactions    Lisinopril      Other reaction(s): Other (See Comments)  Cough and rash         Reglan [Metoclopramide] Other (See Comments)     Restless; akisthesia         REVIEW OF SYSTEMS:  Pertinent items are noted in HPI  A comprehensive review of systems was negative  LABS:  HgA1c: No results found for: HGBA1C  BMP:   Lab Results   Component Value Date    GLUCOSE 91 10/27/2015    CALCIUM 9 0 02/09/2018     10/27/2015    K 3 5 02/09/2018    CO2 25 02/09/2018     02/09/2018    BUN 10 02/09/2018    CREATININE 1 02 02/09/2018           _____________________________________________________  PHYSICAL EXAMINATION:  Vital signs: BP (!) 163/102   Pulse 61   Ht 5' 7" (1 702 m)   Wt 83 6 kg (184 lb 6 4 oz)   BMI 28 88 kg/m²   General: well developed and well nourished, alert, oriented times 3 and appears comfortable  Psychiatric: Normal  HEENT: Trachea Midline, No torticollis  Cardiovascular: No discernable arrhythmia  Pulmonary: No wheezing or stridor  Skin: No masses, erthema, lacerations, fluctation, ulcerations  Neurovascular: Sensation Intact to the Median, Ulnar, Radial Nerve, Motor Intact to the Median, Ulnar, Radial Nerve and Pulses Intact    MUSCULOSKELETAL EXAMINATION:  LEFT SIDE:  CMC: Negative Shoulder Sign, No Instability, Positive grind and Positive tendnerness CMC and Elbow:  Pt has mild ttp along the lateral epicondyle  FROM  Full wrist extension/flexion  No subluxation felt on elbow exam today  Patient also with mild ttp along cubital tunnel with negative Tinel's and negative compression test  5/5 intrinsic strength and FDP small finger  _____________________________________________________  STUDIES REVIEWED:  Images were reviewd in PACS: Xrays of the thumb show mild degeneration  No concern for acute osseous abnormality        PROCEDURES PERFORMED:  Small joint arthrocentesis: L thumb CMC  Date/Time: 7/31/2019 10:43 AM  Consent given by: patient  Timeout: Immediately prior to procedure a time out was called to verify the correct patient, procedure, equipment, support staff and site/side marked as required   Supporting Documentation  Indications: pain   Procedure Details  Location: thumb - L thumb CMC  Needle size: 25 G  Ultrasound guidance: no  Approach: volar  Medications administered: 3 mg betamethasone acetate-betamethasone sodium phosphate 6 (3-3) mg/mL; 0 5 mL lidocaine (PF) 2 %    Patient tolerance: patient tolerated the procedure well with no immediate complications  Dressing:  Sterile dressing applied            Scribe Attestation    I,:   Lindsay Bergeron PA-C am acting as a scribe while in the presence of the attending physician :        I,:   Daniela Burnham MD personally performed the services described in this documentation    as scribed in my presence :

## 2019-08-01 ENCOUNTER — EVALUATION (OUTPATIENT)
Dept: PHYSICAL THERAPY | Facility: CLINIC | Age: 49
End: 2019-08-01
Payer: COMMERCIAL

## 2019-08-01 DIAGNOSIS — G89.29 CHRONIC PAIN OF LEFT KNEE: Primary | ICD-10-CM

## 2019-08-01 DIAGNOSIS — M25.562 CHRONIC PAIN OF LEFT KNEE: Primary | ICD-10-CM

## 2019-08-01 PROCEDURE — 97162 PT EVAL MOD COMPLEX 30 MIN: CPT | Performed by: PHYSICAL THERAPIST

## 2019-08-01 NOTE — PROGRESS NOTES
PT Evaluation     Today's date: 2019  Patient name: Astrid Gonzalez  : 1970  MRN: [id number]  Referring provider: Sirena Zaman PA-C  Dx:   Encounter Diagnosis     ICD-10-CM    1  Chronic pain of left knee M25 562     G89 29                   Assessment  Assessment details: Pt presents with signs and symptoms synonymous of admitting diagnosis  Pt presents with pain, decreased strength, flexibility, as well as tolerance to activity and trunk stability  Pt would benefit skilled PT intervention in order to address these impairments in order to be able to perform all desired activities with minimal to nil symptom exacerbation  If they do not find improvement over the next 2-3 weeks they are consistently following up with referring over the next few weeks so if decline or no improvement is made there is close communication    Thank you very much for this referral      Impairments: abnormal gait, abnormal or restricted ROM, activity intolerance, impaired balance, impaired physical strength, pain with function and poor posture   Understanding of Dx/Px/POC: good   Prognosis: good    Goals  STG 4 Weeks:  Decrease pain at worst to 6/10  Improve range to SLR 65  Improve strength to TA draw 20", hip to 4/5  Independent with HEP  LTG 8 Weeks:  Decrease pain at worst to 3/10  Improve range to (-) Salud  Improve strength to TA draw 30"  Able to perform all desired activities with minimal to nil symptom exacerbation      Plan  Patient would benefit from: skilled physical therapy  Planned modality interventions: cryotherapy, thermotherapy: hydrocollator packs, TENS and iontophoresis  Planned therapy interventions: abdominal trunk stabilization, joint mobilization, manual therapy, patient education, postural training, strengthening, stretching, therapeutic activities, therapeutic exercise, therapeutic training, home exercise program, graded motor, graded exercise, graded activity, gait training, functional ROM exercises and flexibility  Frequency: 2x week  Duration in weeks: 8  Treatment plan discussed with: patient        Subjective Evaluation    History of Present Illness  Date of onset: 2019  Mechanism of injury: Pt is a 51 yomale who is familiar to this facility as he was treated for his L knee with patellar dysfunction until he had plateau  Pt reports that he still has great challenges with squatting and had a synvisc injection series  Pt reports that about 3 months he was changing a tire and he felt a significant pop on the side of his knee  Pt reports that he feels the bone in his knee is popping out, and he met with Dr Ines Cui on 19 and there was imagery performed MRI and X-ray and it indicated OA  He will repeat a synvisc series and presents today for PT  Pt reports pain at baseline is 5/10 and can get up to 10/10 elevations or lots of walking  Pt reports that his knee will occasionally buckle and lock on him and he fears falling  Pt reports that he has a long history of low back pain, but no change in bowel or bladder  He will follow up with referring for the next 3 weeks for the injection series  Pt reports that his goals are to be able to improve his strength, decrease pain and be able to return to gym and functional exercise      Quality of life: good    Pain  Current pain ratin  At best pain ratin  At worst pain rating: 10  Quality: dull ache and sharp  Relieving factors: medications  Aggravating factors: standing, walking and stair climbing  Progression: worsening      Diagnostic Tests  X-ray: normal  MRI studies: normal  Treatments  Previous treatment: injection treatment and physical therapy  Current treatment: injection treatment  Patient Goals  Patient goals for therapy: decreased pain, improved balance, increased motion, increased strength and return to sport/leisure activities          Objective     Active Range of Motion   Left Knee   Flexion: 140 degrees   Extension: 0 degrees     Right Knee   Flexion: 140 degrees   Extension: 0 degrees     Additional Active Range of Motion Details  Sensation intact to light touch L3,4,5,S1,S2  L ankle more sensitive due to Achilles repair  Antalgia with L > R, Genu valgum R > L     SLS L 10" mod sway vs R 10"    SLS HR L 3 before form failure  R 5  Hip ROM  IR L 15, R 30, B er 45  Hip Strength  L Flex 4- Ext 5 Abd 4- Add 4  R Flex 4 Ext 5 Abd 4- Add 4  Knee strength   R 5/5  L 5/5  Palpation: inferior aspect of patella L, Fibular proximal region no laxity, TTT,   Joint Mobility  L Tib on Femur gross ML LM AP PA gross 3,  Patella gross 4  R Tib on Femur Gross ml lm AP PA 2, patella gross 3  STs:   L SLR 60, (-) Jadiel, (-) Lachman, (-) Ant/Post Drawer, + Salud,  + patellar grind     R SLR 55, + Salud  TA draw 15"               Precautions: HTN, Fibromyalgia, RA, Fall risk    Daily Treatment Diary       Manual 8/1            Ionto L Fibular Head, slight distal  10 min            MT ITB ST n v  trial             Handle bar Graston to L ITB trial                                       Exercise Diary             Bike             TA Draw Bridge 2 x 10            ITB and Ham ST L 20" x 4            Gastroc ST with Wedge             HR SL L             SLS Foam L             Mini Squat             Hip Abd R SL             3 way Hip B  RTB           LP + HR    75#         Nose to wall              Side Steps  RTB                                                                                                                                Modalities             CP Prn to L knee

## 2019-08-06 ENCOUNTER — OFFICE VISIT (OUTPATIENT)
Dept: PHYSICAL THERAPY | Facility: CLINIC | Age: 49
End: 2019-08-06
Payer: COMMERCIAL

## 2019-08-06 DIAGNOSIS — M25.562 CHRONIC PAIN OF LEFT KNEE: Primary | ICD-10-CM

## 2019-08-06 DIAGNOSIS — G89.29 CHRONIC PAIN OF LEFT KNEE: Primary | ICD-10-CM

## 2019-08-06 PROCEDURE — 97110 THERAPEUTIC EXERCISES: CPT

## 2019-08-06 PROCEDURE — 97140 MANUAL THERAPY 1/> REGIONS: CPT

## 2019-08-06 NOTE — PROGRESS NOTES
Daily Note     Today's date: 2019  Patient name: Lydia Morrissey  : 1970  MRN: [id number]  Referring provider: Roney Remy PA-C  Dx:   Encounter Diagnosis     ICD-10-CM    1  Chronic pain of left knee M25 562     G89 29                   Subjective: Patient denies pain following his last treatment session  Objective: See treatment diary below      Assessment: Tolerated treatment well  Patient exhibited good technique with therapeutic exercises      Plan: Continue per plan of care        Precautions: HTN, Fibromyalgia, RA, Fall risk    Daily Treatment Diary       Manual            Ionto L Fibular Head, slight distal  10 min 8           MT ITB ST n v  trial             Handle bar Graston to L ITB trial                                       Exercise Diary             Bike  6           TA Draw Bridge 2 x 10            ITB and Ham ST L 20" x 4 :20x4           Gastroc ST with Wedge  :20 x4            HR SL L  2 x 10            SLS Foam L  :10x10            Mini Squat  2 x 10            Hip Abd R SL  1 x 10            3 way Hip B  RTB           LP + HR    75#         Nose to wall              Side Steps  RTB x 5 laps                                                                                                                                Modalities             CP Prn to L knee

## 2019-08-08 ENCOUNTER — APPOINTMENT (OUTPATIENT)
Dept: PHYSICAL THERAPY | Facility: CLINIC | Age: 49
End: 2019-08-08
Payer: COMMERCIAL

## 2019-08-09 ENCOUNTER — OFFICE VISIT (OUTPATIENT)
Dept: OBGYN CLINIC | Facility: HOSPITAL | Age: 49
End: 2019-08-09
Payer: COMMERCIAL

## 2019-08-09 ENCOUNTER — HOSPITAL ENCOUNTER (OUTPATIENT)
Dept: RADIOLOGY | Facility: HOSPITAL | Age: 49
Discharge: HOME | End: 2019-08-09
Payer: COMMERCIAL

## 2019-08-09 VITALS
HEART RATE: 60 BPM | HEIGHT: 67 IN | DIASTOLIC BLOOD PRESSURE: 107 MMHG | SYSTOLIC BLOOD PRESSURE: 171 MMHG | BODY MASS INDEX: 28.88 KG/M2 | WEIGHT: 184 LBS

## 2019-08-09 DIAGNOSIS — M17.12 PRIMARY OSTEOARTHRITIS OF LEFT KNEE: Primary | ICD-10-CM

## 2019-08-09 DIAGNOSIS — M17.12 PRIMARY OSTEOARTHRITIS OF LEFT KNEE: ICD-10-CM

## 2019-08-09 PROCEDURE — 73562 X-RAY EXAM OF KNEE 3: CPT

## 2019-08-09 PROCEDURE — 20610 DRAIN/INJ JOINT/BURSA W/O US: CPT | Performed by: PHYSICIAN ASSISTANT

## 2019-08-09 PROCEDURE — 99213 OFFICE O/P EST LOW 20 MIN: CPT | Performed by: PHYSICIAN ASSISTANT

## 2019-08-13 ENCOUNTER — OFFICE VISIT (OUTPATIENT)
Dept: PHYSICAL THERAPY | Facility: CLINIC | Age: 49
End: 2019-08-13
Payer: COMMERCIAL

## 2019-08-13 DIAGNOSIS — G89.29 CHRONIC PAIN OF LEFT KNEE: Primary | ICD-10-CM

## 2019-08-13 DIAGNOSIS — M25.562 CHRONIC PAIN OF LEFT KNEE: Primary | ICD-10-CM

## 2019-08-13 PROCEDURE — 97033 APP MDLTY 1+IONTPHRSIS EA 15: CPT | Performed by: PHYSICAL THERAPY ASSISTANT

## 2019-08-13 NOTE — PROGRESS NOTES
Daily Note     Today's date: 2019  Patient name: Leonor Rivas  : 1970  MRN: [id number]  Referring provider: Niko Chris PA-C  Dx:   Encounter Diagnosis     ICD-10-CM    1  Chronic pain of left knee M25 562     G89 29                   Subjective: Pt arrived to therapy today reporting that he has been having increased pain in his knee and would like to only do the ionto treatment today  Objective: See treatment diary below      Assessment: Tolerated treatment well  Ionto patch applied over area of pain pt reports that he will continue with HEP as he feels he is albe  Patient would benefit from continued PT      Plan: Continue per plan of care        Precautions: HTN, Fibromyalgia, RA, Fall risk    Daily Treatment Diary       Manual           Ionto L Fibular Head, slight distal  10 min 8 8'          MT ITB ST n v  trial             Handle bar Graston to L ITB trial                                       Exercise Diary             Bike  6           TA Draw Bridge 2 x 10            ITB and Ham ST L 20" x 4 :20x4           Gastroc ST with Wedge  :20 x4            HR SL L  2 x 10            SLS Foam L  :10x10            Mini Squat  2 x 10            Hip Abd R SL  1 x 10            3 way Hip B  RTB           LP + HR    75#         Nose to wall              Side Steps  RTB x 5 laps                                                                                                                                Modalities             CP Prn to L knee

## 2019-08-15 ENCOUNTER — OFFICE VISIT (OUTPATIENT)
Dept: PHYSICAL THERAPY | Facility: CLINIC | Age: 49
End: 2019-08-15
Payer: COMMERCIAL

## 2019-08-15 DIAGNOSIS — M25.562 CHRONIC PAIN OF LEFT KNEE: Primary | ICD-10-CM

## 2019-08-15 DIAGNOSIS — G89.29 CHRONIC PAIN OF LEFT KNEE: Primary | ICD-10-CM

## 2019-08-15 PROCEDURE — 97110 THERAPEUTIC EXERCISES: CPT | Performed by: PHYSICAL THERAPIST

## 2019-08-15 PROCEDURE — 97140 MANUAL THERAPY 1/> REGIONS: CPT | Performed by: PHYSICAL THERAPIST

## 2019-08-15 PROCEDURE — 97112 NEUROMUSCULAR REEDUCATION: CPT | Performed by: PHYSICAL THERAPIST

## 2019-08-15 NOTE — PROGRESS NOTES
Daily Note     Today's date: 8/15/2019  Patient name: Zakiya Hodge  : 1970  MRN: [id number]  Referring provider: Warden Barbara PA-C  Dx:   Encounter Diagnosis     ICD-10-CM    1  Chronic pain of left knee M25 562     G89 29                   Subjective: Pt presents today stating that he is feeling okay  States he has been less active and thus knee is not as flared up and upset  Pt reports that otherwise he had injections yesterday, will be getting more tomorrow, seems to be helping  Objective: See treatment diary below      Assessment:  Correction of side steps with knees straight, hips caused fatigue s/p this  Pt requested further [facility] activities, PT in accord  Assess n v as able to determine further progression with reps and resistance  Plan: Continue per plan of care        Precautions: HTN, Fibromyalgia, RA, Fall risk    Daily Treatment Diary       Manual 8/1 8/6 8/13 8/15         Ionto L Fibular Head, slight distal  10 min 8 8' 8 min         MT ITB ST n v  trial             Handle bar Graston to L ITB trial                                       Exercise Diary             Bike  6  6 min         TA Draw Bridge 2 x 10   2 x 10         ITB and Ham ST L 20" x 4 :20x4  20" x 4         Gastroc ST with Wedge  :20 x4   20" x 4         HR SL L  2 x 10   3 x 12         SLS Foam L  :10x10   20" x 4         Mini Squat  2 x 10   2 x 10         Hip Abd R SL  1 x 10   2 x 10         3 way Hip B  RTB  RTB 2 x 10         LP + HR    75#         Nose to wall              Side Steps  RTB x 5 laps  RTB 6 laps         Mini lunge    2 x 10                                                                                                                 Modalities             CP Prn to L knee    declined

## 2019-08-16 ENCOUNTER — OFFICE VISIT (OUTPATIENT)
Dept: OBGYN CLINIC | Facility: HOSPITAL | Age: 49
End: 2019-08-16
Payer: COMMERCIAL

## 2019-08-16 VITALS
WEIGHT: 184 LBS | DIASTOLIC BLOOD PRESSURE: 100 MMHG | HEART RATE: 71 BPM | BODY MASS INDEX: 28.88 KG/M2 | HEIGHT: 67 IN | SYSTOLIC BLOOD PRESSURE: 155 MMHG

## 2019-08-16 DIAGNOSIS — M17.12 PRIMARY OSTEOARTHRITIS OF LEFT KNEE: Primary | ICD-10-CM

## 2019-08-16 PROCEDURE — 20610 DRAIN/INJ JOINT/BURSA W/O US: CPT | Performed by: PHYSICIAN ASSISTANT

## 2019-08-20 ENCOUNTER — APPOINTMENT (OUTPATIENT)
Dept: PHYSICAL THERAPY | Facility: CLINIC | Age: 49
End: 2019-08-20
Payer: COMMERCIAL

## 2019-08-21 ENCOUNTER — APPOINTMENT (EMERGENCY)
Dept: RADIOLOGY | Facility: HOSPITAL | Age: 49
End: 2019-08-21
Payer: COMMERCIAL

## 2019-08-21 ENCOUNTER — HOSPITAL ENCOUNTER (EMERGENCY)
Facility: HOSPITAL | Age: 49
Discharge: HOME | End: 2019-08-21
Attending: EMERGENCY MEDICINE
Payer: COMMERCIAL

## 2019-08-21 VITALS
RESPIRATION RATE: 20 BRPM | SYSTOLIC BLOOD PRESSURE: 183 MMHG | HEIGHT: 67 IN | WEIGHT: 184.08 LBS | DIASTOLIC BLOOD PRESSURE: 94 MMHG | OXYGEN SATURATION: 96 % | BODY MASS INDEX: 28.89 KG/M2 | TEMPERATURE: 98.6 F | HEART RATE: 88 BPM

## 2019-08-21 DIAGNOSIS — S62.616A: Primary | ICD-10-CM

## 2019-08-21 PROCEDURE — 99283 EMERGENCY DEPT VISIT LOW MDM: CPT

## 2019-08-21 PROCEDURE — 73130 X-RAY EXAM OF HAND: CPT

## 2019-08-21 PROCEDURE — 99284 EMERGENCY DEPT VISIT MOD MDM: CPT | Performed by: EMERGENCY MEDICINE

## 2019-08-21 RX ORDER — IBUPROFEN 400 MG/1
800 TABLET ORAL ONCE
Status: COMPLETED | OUTPATIENT
Start: 2019-08-21 | End: 2019-08-21

## 2019-08-21 RX ADMIN — IBUPROFEN 800 MG: 400 TABLET ORAL at 19:20
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
## 2019-08-22 NOTE — DISCHARGE INSTRUCTIONS
Use tylenol and or ibuprofen for pain, swelling   Keep finger in splint to immobilize   Follow up with Dr Alison Pendleton

## 2019-08-23 ENCOUNTER — OFFICE VISIT (OUTPATIENT)
Dept: OBGYN CLINIC | Facility: HOSPITAL | Age: 49
End: 2019-08-23
Payer: COMMERCIAL

## 2019-08-23 VITALS
WEIGHT: 184 LBS | DIASTOLIC BLOOD PRESSURE: 90 MMHG | HEART RATE: 56 BPM | SYSTOLIC BLOOD PRESSURE: 151 MMHG | BODY MASS INDEX: 28.88 KG/M2 | HEIGHT: 67 IN

## 2019-08-23 DIAGNOSIS — M17.12 PRIMARY OSTEOARTHRITIS OF LEFT KNEE: Primary | ICD-10-CM

## 2019-08-23 PROCEDURE — 20610 DRAIN/INJ JOINT/BURSA W/O US: CPT | Performed by: PHYSICIAN ASSISTANT

## 2019-08-27 ENCOUNTER — APPOINTMENT (OUTPATIENT)
Dept: PHYSICAL THERAPY | Facility: CLINIC | Age: 49
End: 2019-08-27
Payer: COMMERCIAL

## 2019-08-29 ENCOUNTER — APPOINTMENT (OUTPATIENT)
Dept: PHYSICAL THERAPY | Facility: CLINIC | Age: 49
End: 2019-08-29
Payer: COMMERCIAL

## 2019-09-06 NOTE — PROGRESS NOTES
PT Discharge    Today's date: 2019  Patient name: Flory Garza  : 1970  MRN: [id number]  Referring provider: Marcial An PA-C  Dx:   Encounter Diagnosis     ICD-10-CM    1  Chronic pain of left knee M25 562     G89 29        Start Time: 1135  Stop Time: 1220  Total time in clinic (min): 45 minutes    Assessment/Plan  Pt has not been present since 19  Pt's chart will be DC in compliance of facility policy as all Charts are DC within 30 days of last scheduled visit          Subjective    Objective

## 2019-09-09 ENCOUNTER — OFFICE VISIT (OUTPATIENT)
Dept: OCCUPATIONAL THERAPY | Facility: CLINIC | Age: 49
End: 2019-09-09
Payer: COMMERCIAL

## 2019-09-09 ENCOUNTER — APPOINTMENT (OUTPATIENT)
Dept: RADIOLOGY | Facility: CLINIC | Age: 49
End: 2019-09-09
Payer: COMMERCIAL

## 2019-09-09 ENCOUNTER — OFFICE VISIT (OUTPATIENT)
Dept: OBGYN CLINIC | Facility: CLINIC | Age: 49
End: 2019-09-09
Payer: COMMERCIAL

## 2019-09-09 VITALS
HEIGHT: 67 IN | WEIGHT: 184.2 LBS | HEART RATE: 63 BPM | SYSTOLIC BLOOD PRESSURE: 178 MMHG | BODY MASS INDEX: 28.91 KG/M2 | DIASTOLIC BLOOD PRESSURE: 103 MMHG

## 2019-09-09 DIAGNOSIS — S62.646A CLOSED NONDISPLACED FRACTURE OF PROXIMAL PHALANX OF RIGHT LITTLE FINGER, INITIAL ENCOUNTER: ICD-10-CM

## 2019-09-09 DIAGNOSIS — S62.610A: Primary | ICD-10-CM

## 2019-09-09 DIAGNOSIS — M79.641 RIGHT HAND PAIN: ICD-10-CM

## 2019-09-09 DIAGNOSIS — S62.646A CLOSED NONDISPLACED FRACTURE OF PROXIMAL PHALANX OF RIGHT LITTLE FINGER, INITIAL ENCOUNTER: Primary | ICD-10-CM

## 2019-09-09 PROCEDURE — 99214 OFFICE O/P EST MOD 30 MIN: CPT | Performed by: ORTHOPAEDIC SURGERY

## 2019-09-09 PROCEDURE — 26720 TREAT FINGER FRACTURE EACH: CPT | Performed by: ORTHOPAEDIC SURGERY

## 2019-09-09 PROCEDURE — L3808 WHFO, RIGID W/O JOINTS: HCPCS | Performed by: OCCUPATIONAL THERAPIST

## 2019-09-09 PROCEDURE — 73130 X-RAY EXAM OF HAND: CPT

## 2019-09-09 NOTE — PROGRESS NOTES
ASSESSMENT/PLAN:    Assessment:   Right small finger base of proximal phalanx non-displaced fracture    Plan:   Non weight bearing right hand in hand based ulnar gutter splint  Follow up in 3 weeks    To Do Next Visit:  XR right hand out of splint    General Discussions:     Fracture - Nonoperative Care: The physiology of a fractured bone was discussed with the patient today  With nondisplaced or minimally displaced fractures, conservative treatment often results in a functional recovery  Typically, these fractures are immobilized in either a cast or splint depending on the pattern  Radiographs are typically taken at intervals throughout the fracture healing to ensure that muscular forces do not cause loss of reduction or alignment  If the fracture loses its alignment, surgical intervention may be required to stabilize it  Medical conditions such as diabetes, osteoporosis, vitamin D deficiency, and a history of or exposure to smoking may delay or prevent fracture healing       _____________________________________________________  CHIEF COMPLAINT:  Chief Complaint   Patient presents with    Right Hand - Fracture         SUBJECTIVE:  Jose Beck is a 51 y o  male who presents for initial evaluation of right hand small finger injury that occurred when he was playing with his children on 8/21/2019  Pain in the left small finger proximal phalanx worse with motion and relieved with rest  Denies significant numbness or tingling  He does have history of old ring finger and small finger metacarpal neck fractures       PAST MEDICAL HISTORY:  Past Medical History:   Diagnosis Date    Chronic back pain     Fibromyalgia     Hypertension     RA (rheumatoid arthritis) ([address] )        PAST SURGICAL HISTORY:  Past Surgical History:   Procedure Laterality Date    CARPAL TUNNEL RELEASE Left     LATERAL EPICONDYLE RELEASE Right 2/25/2016    Procedure: RELEASE EPICONDYLAR ELBOW, LATERAL;  Surgeon: Daniela Burnham MD; [facility];  Service:     [place] REPAIR ACHILLES TENDON,PRIMARY Left 4/2/2018    Procedure: REPAIR TENDON ACHILLES;  Surgeon: Eleno Elliott MD;  [facility];  Service: Orthopedics    VT TENOTOMY ELBOW LATERAL/MEDIAL DEBRIDE OPEN Left 2/12/2019    Procedure: RELEASE EPICONDYLAR ELBOW;  Surgeon: Austin Quezada MD;  [facility];  Service: Orthopedics    ROTATOR CUFF REPAIR Left     ULNAR TUNNEL RELEASE Left     x2       FAMILY HISTORY:  Family History   Problem Relation Age of Onset    Hypertension Mother     Diabetes Mother        SOCIAL HISTORY:  Social History     Tobacco Use    Smoking status: Never Smoker    Smokeless tobacco: Never Used   Substance Use Topics    Alcohol use: No    Drug use: No       MEDICATIONS:    Current Outpatient Medications:     amLODIPine (NORVASC) 10 mg tablet, Take 10 mg by mouth every evening  , Disp: , Rfl:     Butalbital-APAP-Caffeine (FIORICET PO), Take 1 tablet by mouth daily as needed Unknown dose  , Disp: , Rfl:     dexamethasone (DECADRON) 4 mg/mL, 1 mL (4 mg total) by Iontophoresis route 3 (three) times a week, Disp: 30 mL, Rfl: 1    levothyroxine 25 mcg tablet, , Disp: , Rfl: 0    Losartan Potassium (COZAAR PO), Take 100 mg by mouth daily  , Disp: , Rfl:     meloxicam (MOBIC) 15 mg tablet, Take 15 mg by mouth, Disp: , Rfl:     methocarbamol (ROBAXIN) 750 mg tablet, Take 750 mg by mouth 4 (four) times a day, Disp: , Rfl:     metoprolol succinate (TOPROL-XL) 50 mg 24 hr tablet, "I take one a day bedtime", Disp: , Rfl:     traMADol (ULTRAM) 50 mg tablet, Take 50 mg by mouth every 6 (six) hours as needed for moderate pain, Disp: , Rfl:     ALLERGIES:  Allergies   Allergen Reactions    Lisinopril      Other reaction(s): Other (See Comments)  Cough and rash         Reglan [Metoclopramide] Other (See Comments)     Restless; akisthesia         REVIEW OF SYSTEMS:  Pertinent items are noted in HPI    A comprehensive review of systems was negative  LABS:  HgA1c: No results found for: HGBA1C  BMP:   Lab Results   Component Value Date    GLUCOSE 91 10/27/2015    CALCIUM 9 0 02/09/2018     10/27/2015    K 3 5 02/09/2018    CO2 25 02/09/2018     02/09/2018    BUN 10 02/09/2018    CREATININE 1 02 02/09/2018       _____________________________________________________  PHYSICAL EXAMINATION:  Vital signs: BP (!) 178/103   Pulse 63   Ht 5' 7" (1 702 m)   Wt 83 6 kg (184 lb 3 2 oz)   BMI 28 85 kg/m²   General: well developed and well nourished, alert, oriented times 3 and appears comfortable  Psychiatric: Normal  HEENT: Trachea Midline, No torticollis  Cardiovascular: No discernable arrhythmia  Pulmonary: No wheezing or stridor  Skin: No masses, erthema, lacerations, fluctation, ulcerations  Neurovascular: Sensation Intact to the Median, Ulnar, Radial Nerve, Motor Intact to the Median, Ulnar, Radial Nerve and Pulses Intact    MUSCULOSKELETAL EXAMINATION:  Extremities:  TTP over right small finger proximal phalanx base  Denies TTP over proximal metacarpal small finger  Able to make composite fist without overlapping of fingers  Flexor and extensor tendons are intact, no evidence of laceration  _____________________________________________________  STUDIES REVIEWED:  Images were reviewd in PACS: right small finger proximal phalanx base fracture non-displaced  Old fracture of ring finger and small finger metacarpal neck fractures      PROCEDURES PERFORMED:  Fracture / Dislocation Treatment  Date/Time: 9/9/2019 1:17 PM  Performed by: Kaycee Noriega MD  Authorized by: Kaycee Noriega MD     Patient Location:  Clinic  Verbal consent obtained?: No    Consent given by:  Patient  Patient identity confirmed:  Verbally with patient  Injury location:  Finger  Location details:  Right little finger  Injury type:  Fracture  Fracture type: proximal phalanx    MCP joint involved?: No    Any IP joint involved?: No    Neurovascular status: Neurovascularly intact    Distal perfusion: normal    Neurological function: normal    Range of motion: reduced    Manipulation performed?: No    Immobilization:  Splint  Splint type:  Ulnar gutter  Neurovascular status: Neurovascularly intact    Distal perfusion: normal    Neurological function: normal    Range of motion: unchanged    Patient tolerance:  Patient tolerated the procedure well with no immediate complications        I interviewed, took the history and examined the patient  I discuss the case with the resident and reviewed the resident's note  I supervised the resident and I agree with the resident management plan as it was presented to me  I was present in the clinic and examined the patient

## 2019-09-09 NOTE — PROGRESS NOTES
Orthosis    Diagnosis: R P1 fx SF  Indication: Fracture    Location: Right  wrist, ring finger and small finger  Supplies: Custom Fit Orthotic and Skin coverage   Orthosis type: Ulnar Gutter Hand-Forearm based  Wearing Schedule: Remove for hygiene only  Describe Position: intrinsic plus    Precautions: Fracture and Universal (skin contact/breakdown)    Patient or Caregiver expresses understanding of wearing Schedule and Precautions? Yes  Patient or Caregiver able to don/doff orthotic independently? Yes    Written orders provided to patient?  Yes  Orders Obtained: Written  Orders Obtained from: Dr Link Orosco      Return for evaluation and treatment No

## 2019-09-12 NOTE — ED PROCEDURE NOTE
PROCEDURE  Splint application  Date/Time: 8/21/2019 8:10 PM  Performed by: Shaquille Willis MD  Authorized by: Shaquille Willis MD     Patient location:  ED  Procedure performed by emergency physician: No    Consent:     Consent obtained:  Verbal  Universal protocol:     Patient identity confirmed:  Verbally with patient and arm band  Indication:     Indications: fracture    Pre-procedure details:     Sensation:  Normal  Procedure details:     Laterality:  Right    Location:  Wrist    Splint type:  Ulnar gutter    Supplies:  Ortho-Glass  Post-procedure details:     Pain:  Unchanged    Sensation:  Normal    Neurovascular Exam: skin pink      Patient tolerance of procedure:   Tolerated well, no immediate complications         Shaquille Willis MD  09/12/19 1136

## 2019-10-03 ENCOUNTER — OFFICE VISIT (OUTPATIENT)
Dept: OBGYN CLINIC | Facility: HOSPITAL | Age: 49
End: 2019-10-03
Payer: COMMERCIAL

## 2019-10-03 ENCOUNTER — HOSPITAL ENCOUNTER (OUTPATIENT)
Dept: RADIOLOGY | Facility: HOSPITAL | Age: 49
Discharge: HOME | End: 2019-10-03
Attending: ORTHOPAEDIC SURGERY
Payer: COMMERCIAL

## 2019-10-03 VITALS — HEART RATE: 73 BPM | SYSTOLIC BLOOD PRESSURE: 164 MMHG | DIASTOLIC BLOOD PRESSURE: 119 MMHG

## 2019-10-03 DIAGNOSIS — M76.61 TENDONITIS, ACHILLES, RIGHT: Primary | ICD-10-CM

## 2019-10-03 DIAGNOSIS — M76.61 TENDONITIS, ACHILLES, RIGHT: ICD-10-CM

## 2019-10-03 PROCEDURE — 73610 X-RAY EXAM OF ANKLE: CPT

## 2019-10-03 PROCEDURE — 99213 OFFICE O/P EST LOW 20 MIN: CPT | Performed by: ORTHOPAEDIC SURGERY

## 2019-10-03 NOTE — PROGRESS NOTES
Assessment:   Diagnosis ICD-10-CM Associated Orders   1  Tendonitis, Achilles, right M76 61 XR ankle 3+ vw right       Plan:    Due to continued right achilles pain will place patient in CAM boot high tide and get MRI to further evaluate for partial achilles tear, strain  Patient has tried physical therapy in past, anti inflammatories, ice, rest    MRI will help determine future treatment  He can walk as tolerated in CAM boot and rest  See back to review MRI     To do next visit:  No follow-ups on file  The above stated was discussed in layman's terms and the patient expressed understanding  All questions were answered to the patient's satisfaction  Scribe Attestation    I,:    am acting as a scribe while in the presence of the attending physician :        I,:    personally performed the services described in this documentation    as scribed in my presence :              Subjective:   Kathy Barger is a 51 y o  male who presents for f/u regarding his right achilles  He has been dealing with achilles tendonitis the past few months  He has done physical therapy earlier in year  His pain continues to increase, he is worried about rupturing his achilles  History left achilles rupture  [place] was in [place] a lot of walking and since then he is getting pain and acute sharp electrical pain from achilles up into his mid calf  He is wearing shoe inserts  He is not able to weight bear long due to pain in achilles  He tries to rest, ice, anti inflammatories with no relief  No acute injury or trauma         Review of systems negative unless otherwise specified in HPI    Past Medical History:   Diagnosis Date    Chronic back pain     Fibromyalgia     Hypertension     RA (rheumatoid arthritis) [address] [address]        Past Surgical History:   Procedure Laterality Date    CARPAL TUNNEL RELEASE Left     LATERAL EPICONDYLE RELEASE Right 2/25/2016    Procedure: RELEASE EPICONDYLAR ELBOW, LATERAL;  Surgeon: Gary Ramirez MD;  [facility];  Service:     [place] REPAIR ACHILLES TENDON,PRIMARY Left 4/2/2018    Procedure: REPAIR TENDON ACHILLES;  Surgeon: Marla Messer MD;  [facility];  Service: Orthopedics    [place] TENOTOMY ELBOW LATERAL/MEDIAL DEBRIDE OPEN Left 2/12/2019    Procedure: RELEASE EPICONDYLAR ELBOW;  Surgeon: Gary Ramirez MD;  [facility];  Service: Orthopedics    ROTATOR CUFF REPAIR Left     ULNAR TUNNEL RELEASE Left     x2       Family History   Problem Relation Age of Onset    Hypertension Mother     Diabetes Mother        Social History     Occupational History    Not on file   Tobacco Use    Smoking status: Never Smoker    Smokeless tobacco: Never Used   Substance and Sexual Activity    Alcohol use: No    Drug use: No    Sexual activity: Yes         Current Outpatient Medications:     amLODIPine (NORVASC) 10 mg tablet, Take 10 mg by mouth every evening  , Disp: , Rfl:     Butalbital-APAP-Caffeine (FIORICET PO), Take 1 tablet by mouth daily as needed Unknown dose  , Disp: , Rfl:     dexamethasone (DECADRON) 4 mg/mL, 1 mL (4 mg total) by Iontophoresis route 3 (three) times a week, Disp: 30 mL, Rfl: 1    levothyroxine 25 mcg tablet, , Disp: , Rfl: 0    Losartan Potassium (COZAAR PO), Take 100 mg by mouth daily  , Disp: , Rfl:     meloxicam (MOBIC) 15 mg tablet, Take 15 mg by mouth, Disp: , Rfl:     methocarbamol (ROBAXIN) 750 mg tablet, Take 750 mg by mouth 4 (four) times a day, Disp: , Rfl:     metoprolol succinate (TOPROL-XL) 50 mg 24 hr tablet, "I take one a day bedtime", Disp: , Rfl:     traMADol (ULTRAM) 50 mg tablet, Take 50 mg by mouth every 6 (six) hours as needed for moderate pain, Disp: , Rfl:     Allergies   Allergen Reactions    Lisinopril      Other reaction(s):  Other (See Comments)  Cough and rash         Reglan [Metoclopramide] Other (See Comments)     Restless; akisthesia            There were no vitals filed for this visit  Objective:                    Right Ankle Exam     Tenderness   Right ankle tenderness location: achilles   Swelling: none    Range of Motion   Dorsiflexion: normal   Plantar flexion: normal   Eversion: normal   Inversion: normal     Muscle Strength   Dorsiflexion:  5/5  Plantar flexion:  5/5    Tests   Anterior drawer: negative    Other   Erythema: absent  Scars: absent  Sensation: normal  Pulse: present     Comments:  No defect felt in achilles             Diagnostics, reviewed and taken today if performed as documented: The attending physician has personally reviewed the pertinent films in PACS and interpretation is as follows:  XR right ankle no significant degenerative changes no osseus abnormalities  Procedures, if performed today:    Procedures    None performed      Portions of the record may have been created with voice recognition software  Occasional wrong word or "sound a like" substitutions may have occurred due to the inherent limitations of voice recognition software  Read the chart carefully and recognize, using context, where substitutions have occurred

## 2019-10-05 ENCOUNTER — HOSPITAL ENCOUNTER (EMERGENCY)
Facility: HOSPITAL | Age: 49
Discharge: HOME | End: 2019-10-05
Attending: EMERGENCY MEDICINE
Payer: COMMERCIAL

## 2019-10-05 VITALS
TEMPERATURE: 98.7 F | OXYGEN SATURATION: 98 % | HEART RATE: 81 BPM | WEIGHT: 184.3 LBS | DIASTOLIC BLOOD PRESSURE: 100 MMHG | HEIGHT: 67 IN | RESPIRATION RATE: 16 BRPM | SYSTOLIC BLOOD PRESSURE: 161 MMHG | BODY MASS INDEX: 28.93 KG/M2

## 2019-10-05 DIAGNOSIS — M25.571 RIGHT ANKLE PAIN: Primary | ICD-10-CM

## 2019-10-05 LAB — DEPRECATED D DIMER PPP: 375 NG/ML (FEU)

## 2019-10-05 PROCEDURE — 96372 THER/PROPH/DIAG INJ SC/IM: CPT

## 2019-10-05 PROCEDURE — 99283 EMERGENCY DEPT VISIT LOW MDM: CPT

## 2019-10-05 PROCEDURE — 85379 FIBRIN DEGRADATION QUANT: CPT | Performed by: EMERGENCY MEDICINE

## 2019-10-05 PROCEDURE — 36415 COLL VENOUS BLD VENIPUNCTURE: CPT | Performed by: EMERGENCY MEDICINE

## 2019-10-05 PROCEDURE — 99284 EMERGENCY DEPT VISIT MOD MDM: CPT | Performed by: EMERGENCY MEDICINE

## 2019-10-05 RX ORDER — KETOROLAC TROMETHAMINE 30 MG/ML
15 INJECTION, SOLUTION INTRAMUSCULAR; INTRAVENOUS ONCE
Status: COMPLETED | OUTPATIENT
Start: 2019-10-05 | End: 2019-10-05

## 2019-10-05 RX ORDER — METHOCARBAMOL 500 MG/1
500 TABLET, FILM COATED ORAL 2 TIMES DAILY
Qty: 20 TABLET | Refills: 0 | Status: SHIPPED | OUTPATIENT
Start: 2019-10-05 | End: 2020-07-01 | Stop reason: ALTCHOICE

## 2019-10-05 RX ORDER — IBUPROFEN 600 MG/1
600 TABLET ORAL EVERY 6 HOURS SCHEDULED
Qty: 30 TABLET | Refills: 0 | Status: SHIPPED | OUTPATIENT
Start: 2019-10-05 | End: 2020-08-21

## 2019-10-05 RX ADMIN — KETOROLAC TROMETHAMINE 15 MG: 30 INJECTION, SOLUTION INTRAMUSCULAR at 17:11

## 2019-10-05 NOTE — ED ATTENDING ATTESTATION
10/5/2019  I, Delano Logan MD, saw and evaluated the patient  I have discussed the patient with the resident/non-physician practitioner and agree with the resident's/non-physician practitioner's findings, Plan of Care, and MDM as documented in the resident's/non-physician practitioner's note, except where noted  All available labs and Radiology studies were reviewed  I was present for key portions of any procedure(s) performed by the resident/non-physician practitioner and I was immediately available to provide assistance  At this point I agree with the current assessment done in the Emergency Department  I have conducted an independent evaluation of this patient a history and physical is as follows:  Right leg pain  Patient has been having right leg pain for the last week or so  Patient states he recently got [place] from [email or web address]  He describes a sharp lancinating pain that occurs randomly without any provocation that radiates from his calf down to his heel  The patient states that he saw orthopedics and is scheduled for an MRI in 2 weeks  Patient believes that it is tendinitis  Patient denies numbness, tingling, weakness  The pain is not exacerbated by flexing or extending his foot  He says the pain is severe nothing helps it  The patient states he cannot wait for his MRI for 2 weeks, and need something done will not leave here today without something being done  He states he does not want medications, for what answers and solutions  Patient has not had fevers or chills  He denies limb swelling  Has not had skin changes  Review of systems otherwise negative in 12 systems reviewed  On exam the patient's compartments are soft  He has no skin changes  He is neurovascularly intact  His Achilles tendon is nontender  He has some mild calf tenderness  Remainder of his exam is normal   Impression:  Leg pain    Will plan to check D-dimer, will medicate for pain  ED Course         Critical Care Time  Procedures

## 2019-10-05 NOTE — ED PROVIDER NOTES
History  Chief Complaint   Patient presents with    Ankle Pain     Patient reports right ankle pain that radiates up his leg  States he saw orthopedics 2 days ago and scheduled for MRI, but he is in too much pain and he is not leaving today without getting something done  52 y/o M with a pmhx of Lt ruptured achilles tendon with Rt achilles tendonitis being evaluated by Ortho  Pt has an MRI scheduled for 2 weeks presents to the ED for eval of worsening pain in the Rt posterior ankle and distal calf after walking around in [place] on [place] last week  Pt is in a boot and is able to ambulate with minimal pain  Pt states that he is having pain that is intermittent and sharp in nature  Pain is 8/10 with minimal improvement with NSAIDs used PRN  Pt has hx of provoked DVT in the Lt left  Denies fever/chills, nausea/vomiting, lightheadedness/dizziness, numbness/weakness, headache, change in vision, URI symptoms, neck pain, chest pain, palpitations, shortness of breath, cough, back pain, flank pain, abdominal pain, diarrhea, hematochezia, melena, dysuria, hematuria, abnormal genital d/c                 Prior to Admission Medications   Prescriptions Last Dose Informant Patient Reported? Taking?    Butalbital-APAP-Caffeine (FIORICET PO)  Self Yes Yes   Sig: Take 1 tablet by mouth daily as needed Unknown dose     Losartan Potassium (COZAAR PO)  Self Yes Yes   Sig: Take 100 mg by mouth daily     amLODIPine (NORVASC) 10 mg tablet  Self Yes Yes   Sig: Take 10 mg by mouth every evening     dexamethasone (DECADRON) 4 mg/mL   No Yes   Si mL (4 mg total) by Iontophoresis route 3 (three) times a week   levothyroxine 25 mcg tablet   Yes Yes   meloxicam (MOBIC) 15 mg tablet   Yes Yes   Sig: Take 15 mg by mouth   methocarbamol (ROBAXIN) 750 mg tablet  Self Yes No   Sig: Take 750 mg by mouth 4 (four) times a day   metoprolol succinate (TOPROL-XL) 50 mg 24 hr tablet  Self Yes Yes   Sig: "I take one a day bedtime"   traMADol (ULTRAM) 50 mg tablet   Yes Yes   Sig: Take 50 mg by mouth every 6 (six) hours as needed for moderate pain      Facility-Administered Medications: None       Past Medical History:   Diagnosis Date    Chronic back pain     Fibromyalgia     Hypertension     RA (rheumatoid [address]) ([facility] [address] )        Past Surgical History:   Procedure Laterality Date    CARPAL TUNNEL RELEASE Left     LATERAL EPICONDYLE RELEASE Right 2/25/2016    Procedure: RELEASE EPICONDYLAR ELBOW, LATERAL;  Surgeon: Haylie Dugan MD;  [facility];  Service:     [place] REPAIR ACHILLES TENDON,PRIMARY Left 4/2/2018    Procedure: REPAIR TENDON ACHILLES;  Surgeon: Harriett Rush MD;  [facility];  Service: Orthopedics    [place] TENOTOMY ELBOW LATERAL/MEDIAL DEBRIDE OPEN Left 2/12/2019    Procedure: RELEASE EPICONDYLAR ELBOW;  Surgeon: Haylie Dugan MD;  [facility];  Service: Orthopedics    ROTATOR CUFF REPAIR Left     ULNAR TUNNEL RELEASE Left     x2       Family History   Problem Relation Age of Onset    Hypertension Mother     Diabetes Mother      I have reviewed and agree with the history as documented  Social History     Tobacco Use    Smoking status: Never Smoker    Smokeless tobacco: Never Used   Substance Use Topics    Alcohol use: No    Drug use: No        Review of Systems   Constitutional: Negative for chills, diaphoresis, fatigue and fever  HENT: Negative for congestion, ear discharge, facial swelling, hearing loss, rhinorrhea, sinus pressure, sinus pain, sneezing, sore throat, tinnitus and trouble swallowing  Eyes: Negative for pain, discharge and redness  Respiratory: Negative for cough, choking, chest tightness, shortness of breath, wheezing and stridor  Cardiovascular: Negative for chest pain, palpitations and leg swelling  Gastrointestinal: Negative for abdominal distention, abdominal pain, blood in stool, constipation, diarrhea, nausea and vomiting     Endocrine: Negative for cold intolerance, polydipsia and polyuria  Genitourinary: Negative for difficulty urinating, dysuria, enuresis, flank pain, frequency and hematuria  Musculoskeletal: Positive for myalgias  Negative for arthralgias, back pain, gait problem and neck stiffness  Skin: Negative for rash and wound  Neurological: Negative for dizziness, seizures, syncope, weakness, numbness and headaches  Hematological: Negative for adenopathy  Psychiatric/Behavioral: Negative for agitation, confusion, hallucinations, sleep disturbance and suicidal ideas  All other systems reviewed and are negative  Physical Exam  ED Triage Vitals [10/05/19 1653]   Temperature Pulse Respirations Blood Pressure SpO2   98 7 °F (37 1 °C) 81 16 (!) 181/134 98 %      Temp Source Heart Rate Source Patient Position - Orthostatic VS BP Location FiO2 (%)   Oral Monitor Sitting Left arm --      Pain Score       Worst Possible Pain             Orthostatic Vital Signs  Vitals:    10/05/19 1653 10/05/19 1745   BP: (!) 181/134 161/100   Pulse: 81    Patient Position - Orthostatic VS: Sitting Lying       Physical Exam   Constitutional: He is oriented to person, place, and time  He appears well-developed and well-nourished  No distress  HENT:   Head: Normocephalic and atraumatic  Eyes: Conjunctivae and EOM are normal    Neck: Normal range of motion  Cardiovascular: Normal rate and regular rhythm  Exam reveals no gallop and no friction rub  No murmur heard  Pulmonary/Chest: Breath sounds normal  No respiratory distress  He has no wheezes  He has no rales  Abdominal: Soft  Normal appearance and bowel sounds are normal  There is no tenderness  There is no rigidity, no rebound, no guarding, no CVA tenderness, no tenderness at McBurney's point and negative Cardoso's sign  Musculoskeletal:        Legs:  Neurological: He is alert and oriented to person, place, and time  No cranial nerve deficit or sensory deficit  GCS eye subscore is 4   GCS verbal subscore is 5  GCS motor subscore is 6  Reflex Scores:       Bicep reflexes are 2+ on the right side and 2+ on the left side  Patellar reflexes are 2+ on the right side and 2+ on the left side  Patient has equal 5/5 strength b/l UE and Le  No focal neuro deficits noted  Skin: Skin is warm and dry  Capillary refill takes less than 2 seconds  He is not diaphoretic  Psychiatric: He has a normal mood and affect  His behavior is normal  Judgment and thought content normal    Nursing note and vitals reviewed  ED Medications  Medications   ketorolac (TORADOL) injection 15 mg (15 mg Intramuscular Given 10/5/19 1711)       Diagnostic Studies  Results Reviewed     Procedure Component Value Units Date/Time    D-dimer, quantitative [[id number]]  (Normal) Collected:  10/05/19 1726    Lab Status:  Final result Specimen:  Blood from Arm, Right Updated:  10/05/19 1801     D-Dimer, Quant 375 ng/ml (FEU)                  No orders to display         Procedures  Procedures        ED Course                               MDM  Number of Diagnoses or Management Options  Right ankle pain: new and requires workup  Diagnosis management comments:  History DVT  Will diameter due to moderate wells DVT risk   likely inflammation at the junction of the gastroc -Achilles with intermittent spasms  Will give Toradol 15 mg IM  Dimer Neg    1  Right leg pain  - scheduled Motrin versus p r n   - PCP follow-up  - maintain boot as well as MRI appointment          Disposition  Final diagnoses:   Right ankle pain     Time reflects when diagnosis was documented in both MDM as applicable and the Disposition within this note     Time User Action Codes Description Comment    10/5/2019  6:07 PM Jamarcus Michelle Add [M25 571] Right ankle pain       ED Disposition     ED Disposition Condition Date/Time Comment    Discharge Stable Sat Oct 5, 2019  6:07 PM Nicole Jeffries discharge to home/self care              Follow-up Information     Follow up With Specialties Details Why Contact Info Additional 340 Jamilah Carranza MD Internal Medicine Go to  If symptoms worsen, As needed [address]       [address] [place] [address] Emergency Department Emergency Medicine Go to  As needed, If symptoms worsen [address]  [phone]  ED, [address] [place] [address], [address]   [phone]          Patient's Medications   Discharge Prescriptions    IBUPROFEN (MOTRIN) 600 MG TABLET    Take 1 tablet (600 mg total) by mouth every 6 (six) hours       Start Date: 10/5/2019 End Date: --       Order Dose: 600 mg       Quantity: 30 tablet    Refills: 0     No discharge procedures on file  ED Provider  Attending physically available and evaluated Josee Smith I managed the patient along with the ED Attending      Electronically Signed by         Gabriela Pollock DO  10/05/19 4363

## 2019-10-05 NOTE — ED NOTES
Pt stating that he is not going home like this  Pt states that he wants his MRI done today, Dr Howie Irizarry explained to pt that we only do MRIs in ED for strokes  Pt states then admit me I'm not going home like this  Dr Howie Irizarry explained to pt that we can not just admit him       Shiva Post RN  10/05/19 [id number]

## 2019-10-12 ENCOUNTER — HOSPITAL ENCOUNTER (OUTPATIENT)
Dept: RADIOLOGY | Facility: HOSPITAL | Age: 49
Discharge: HOME | End: 2019-10-12
Attending: ORTHOPAEDIC SURGERY
Payer: COMMERCIAL

## 2019-10-12 DIAGNOSIS — M76.61 TENDONITIS, ACHILLES, RIGHT: ICD-10-CM

## 2019-10-12 PROCEDURE — 73721 MRI JNT OF LWR EXTRE W/O DYE: CPT

## 2019-10-15 ENCOUNTER — OFFICE VISIT (OUTPATIENT)
Dept: OBGYN CLINIC | Facility: HOSPITAL | Age: 49
End: 2019-10-15
Payer: COMMERCIAL

## 2019-10-15 VITALS
BODY MASS INDEX: 29.35 KG/M2 | SYSTOLIC BLOOD PRESSURE: 175 MMHG | HEIGHT: 67 IN | DIASTOLIC BLOOD PRESSURE: 108 MMHG | HEART RATE: 71 BPM | WEIGHT: 187 LBS

## 2019-10-15 DIAGNOSIS — M67.88 ACHILLES TENDINOSIS OF RIGHT LOWER EXTREMITY: Primary | ICD-10-CM

## 2019-10-15 PROCEDURE — 99213 OFFICE O/P EST LOW 20 MIN: CPT | Performed by: ORTHOPAEDIC SURGERY

## 2019-10-15 NOTE — PROGRESS NOTES
51 y o male presents to the office for follow up of right achilles pain  He is here today to review the results of his right ankle/heel MRI  He has history of left achilles tendon repair on 04/02/2018  He continues to experience right achilles pain which he describes a tearing and electrical in nature  He denies any numbness or tingling  He has no new complaints or concerns today in the office      Review of Systems  Review of systems negative unless otherwise specified in HPI    Past Medical History  Past Medical History:   Diagnosis Date    Chronic back pain     Fibromyalgia     Hypertension     RA [facility]rheumatoid arthritis) ([facility] [address] )        Past Surgical History  Past Surgical History:   Procedure Laterality Date    CARPAL TUNNEL RELEASE Left     LATERAL EPICONDYLE RELEASE Right 2/25/2016    Procedure: RELEASE EPICONDYLAR ELBOW, LATERAL;  Surgeon: Felicity Cranker, MD;  [facility];  Service:     NC REPAIR ACHILLES TENDON,PRIMARY Left 4/2/2018    Procedure: REPAIR TENDON ACHILLES;  Surgeon: Urmila Meléndez MD;  [facility];  Service: Orthopedics    NC TENOTOMY ELBOW LATERAL/MEDIAL DEBRIDE OPEN Left 2/12/2019    Procedure: RELEASE EPICONDYLAR ELBOW;  Surgeon: Felicity Cranker, MD;  [facility];  Service: Orthopedics    ROTATOR CUFF REPAIR Left     ULNAR TUNNEL RELEASE Left     x2       Current Medications  Current Outpatient Medications on File Prior to Visit   Medication Sig Dispense Refill    amLODIPine (NORVASC) 10 mg tablet Take 10 mg by mouth every evening        Butalbital-APAP-Caffeine (FIORICET PO) Take 1 tablet by mouth daily as needed Unknown dose        dexamethasone (DECADRON) 4 mg/mL 1 mL (4 mg total) by Iontophoresis route 3 (three) times a week 30 mL 1    ibuprofen (MOTRIN) 600 mg tablet Take 1 tablet (600 mg total) by mouth every 6 (six) hours 30 tablet 0    levothyroxine 25 mcg tablet   0    Losartan Potassium (COZAAR PO) Take 100 mg by mouth daily        meloxicam (MOBIC) 15 mg tablet Take 15 mg by mouth      methocarbamol (ROBAXIN) 500 mg tablet Take 1 tablet (500 mg total) by mouth 2 (two) times a day 20 tablet 0    methocarbamol (ROBAXIN) 750 mg tablet Take 750 mg by mouth 4 (four) times a day      metoprolol succinate (TOPROL-XL) 50 mg 24 hr tablet "I take one a day bedtime"      traMADol (ULTRAM) 50 mg tablet Take 50 mg by mouth every 6 (six) hours as needed for moderate pain       No current facility-administered medications on file prior to visit  Recent Labs [facility])  0   Lab Value Date/Time    HCT 48 0 02/09/2018 2239    HCT 47 8 10/27/2015 1646    HGB 17 1 (H) 02/09/2018 2239    HGB 17 0 10/27/2015 1646    WBC 5 33 02/09/2018 2239    WBC 10 38 (H) 10/27/2015 1646    INR 1 04 04/02/2018 0745    INR 0 99 10/27/2015 1646    GLUCOSE 91 10/27/2015 1646         Physical exam  · General: Awake, Alert, Oriented  · Eyes: Pupils equal, round and reactive to light  · Heart: regular rate and rhythm  · Lungs: No audible wheezing  · Abdomen: soft  Right ankle  · Patient ambulates without assistance  · Non-tender to palpation  · Full ROM  · Strength 5/5  · Sensation intact  · Skin is warm and well perfused      Imaging  Images were personally reviewed with the patient    MRI of right ankle/heel 10/12/2019 was reviewed and shows achilles tendinosis      Assessment/Plan:   49 y o male with right achilles tendinosis will start physical therapy  He will continue exercises on his left ankle  We will see him back in 4 weeks for re-evaluation      Scribe Attestation    I,:   Aroldo Mcdonald am acting as a scribe while in the presence of the attending physician :        I,:   Raul Abraham MD personally performed the services described in this documentation    as scribed in my presence :

## 2019-11-12 ENCOUNTER — OFFICE VISIT (OUTPATIENT)
Dept: OBGYN CLINIC | Facility: HOSPITAL | Age: 49
End: 2019-11-12
Payer: COMMERCIAL

## 2019-11-12 VITALS
DIASTOLIC BLOOD PRESSURE: 104 MMHG | HEART RATE: 64 BPM | HEIGHT: 67 IN | BODY MASS INDEX: 29.35 KG/M2 | SYSTOLIC BLOOD PRESSURE: 164 MMHG | WEIGHT: 187 LBS

## 2019-11-12 DIAGNOSIS — M76.61 ACHILLES TENDINITIS OF RIGHT LOWER EXTREMITY: Primary | ICD-10-CM

## 2019-11-12 PROCEDURE — 99213 OFFICE O/P EST LOW 20 MIN: CPT | Performed by: ORTHOPAEDIC SURGERY

## 2019-11-12 RX ORDER — GABAPENTIN 300 MG/1
CAPSULE ORAL
COMMUNITY
Start: 2019-10-21 | End: 2020-08-21

## 2019-11-12 NOTE — PROGRESS NOTES
51 y o male presents to the office for follow up of right achilles pain  He has history of left achilles tendon repair on 04/02/2018  He has been participating in therapy since his last visit doing home PT exercises  He states his pain has significantly improved since his last visit though he will occasionally get flare ups of his pain that he states resolve with walking around the house  He is taking medications for pain as needed  He has no new complaints or concerns today in the office  Review of Systems  Review of systems negative unless otherwise specified in HPI    Past Medical History  Past Medical History:   Diagnosis Date    Chronic back pain     Fibromyalgia     Hypertension     RA (rheumatoid arthritis) ([address] )        Past Surgical History  Past Surgical History:   Procedure Laterality Date    CARPAL TUNNEL RELEASE Left     LATERAL EPICONDYLE RELEASE Right 2/25/2016    Procedure: RELEASE EPICONDYLAR ELBOW, LATERAL;  Surgeon: Angel Gentile MD;  [facility];  Service:     HI REPAIR ACHILLES TENDON,PRIMARY Left 4/2/2018    Procedure: REPAIR TENDON ACHILLES;  Surgeon: Dvaid Childress MD;  [facility];  Service: Orthopedics    HI TENOTOMY ELBOW LATERAL/MEDIAL DEBRIDE OPEN Left 2/12/2019    Procedure: RELEASE EPICONDYLAR ELBOW;  Surgeon: Angel Gentile MD;  [facility];  Service: Orthopedics    ROTATOR CUFF REPAIR Left     ULNAR TUNNEL RELEASE Left     x2       Current Medications  Current Outpatient Medications on File Prior to Visit   Medication Sig Dispense Refill    gabapentin (NEURONTIN) 300 mg capsule One po qhs x 3 days, then one po bid x 3 days, then one po tid        amLODIPine (NORVASC) 10 mg tablet Take 10 mg by mouth every evening        Butalbital-APAP-Caffeine (FIORICET PO) Take 1 tablet by mouth daily as needed Unknown dose        dexamethasone (DECADRON) 4 mg/mL 1 mL (4 mg total) by Iontophoresis route 3 (three) times a week 30 mL 1    ibuprofen (MOTRIN) 600 mg tablet Take 1 tablet (600 mg total) by mouth every 6 (six) hours 30 tablet 0    levothyroxine 25 mcg tablet   0    Losartan Potassium (COZAAR PO) Take 100 mg by mouth daily        meloxicam (MOBIC) 15 mg tablet Take 15 mg by mouth      methocarbamol (ROBAXIN) 500 mg tablet Take 1 tablet (500 mg total) by mouth 2 (two) times a day 20 tablet 0    methocarbamol (ROBAXIN) 750 mg tablet Take 750 mg by mouth 4 (four) times a day      metoprolol succinate (TOPROL-XL) 50 mg 24 hr tablet "I take one a day bedtime"      traMADol (ULTRAM) 50 mg tablet Take 50 mg by mouth every 6 (six) hours as needed for moderate pain       No current facility-administered medications on file prior to visit  Recent Labs [facility])  0   Lab Value Date/Time    HCT 48 0 02/09/2018 2239    HCT 47 8 10/27/2015 1646    HGB 17 1 (H) 02/09/2018 2239    HGB 17 0 10/27/2015 1646    WBC 5 33 02/09/2018 2239    WBC 10 38 (H) 10/27/2015 1646    INR 1 04 04/02/2018 0745    INR 0 99 10/27/2015 1646    GLUCOSE 91 10/27/2015 1646         Physical exam  · General: Awake, Alert, Oriented  · Eyes: Pupils equal, round and reactive to light  · Heart: regular rate and intact distal pulses   · Lungs: No audible wheezing  · Abdomen: soft  Right ankle  · Patient ambulates without assistance  · Non-tender to palpation  · Full ROM  · Strength 5/5  · Sensation intact  · Skin is warm and well perfused      Imaging  No new imaging obtained at today's appointment       Assessment/Plan:   49 y o male with improving right achilles tendinosis, continue with home exercises  He will continue exercises on his left ankle  Continue analgesics as needed for pain  Reviewed with the patient possible PRP injection for the right Achilles tendinitis  Discussed with patient he will likely have frequent flares in his pain level due to his multiple chronic pain conditions (patient has diagnosis of fibromyalgia)    Patient to follow-up every 3 months, sooner if necessary

## 2020-04-21 RX ORDER — BETAMETHASONE SODIUM PHOSPHATE AND BETAMETHASONE ACETATE 3; 3 MG/ML; MG/ML
6 INJECTION, SUSPENSION INTRA-ARTICULAR; INTRALESIONAL; INTRAMUSCULAR; SOFT TISSUE
Status: COMPLETED | OUTPATIENT
Start: 2019-06-18 | End: 2019-06-18

## 2020-04-21 RX ORDER — BUPIVACAINE HYDROCHLORIDE 2.5 MG/ML
2 INJECTION, SOLUTION INFILTRATION; PERINEURAL
Status: COMPLETED | OUTPATIENT
Start: 2019-06-18 | End: 2019-06-18

## 2020-04-21 RX ORDER — LIDOCAINE HYDROCHLORIDE 10 MG/ML
2 INJECTION, SOLUTION INFILTRATION; PERINEURAL
Status: COMPLETED | OUTPATIENT
Start: 2019-06-18 | End: 2019-06-18

## 2020-07-01 ENCOUNTER — OFFICE VISIT (OUTPATIENT)
Dept: OBGYN CLINIC | Facility: OTHER | Age: 50
End: 2020-07-01
Payer: COMMERCIAL

## 2020-07-01 ENCOUNTER — APPOINTMENT (OUTPATIENT)
Dept: RADIOLOGY | Facility: OTHER | Age: 50
End: 2020-07-01
Payer: COMMERCIAL

## 2020-07-01 VITALS
SYSTOLIC BLOOD PRESSURE: 158 MMHG | HEIGHT: 67 IN | BODY MASS INDEX: 29.35 KG/M2 | DIASTOLIC BLOOD PRESSURE: 113 MMHG | WEIGHT: 187 LBS | HEART RATE: 69 BPM

## 2020-07-01 DIAGNOSIS — M17.12 PRIMARY OSTEOARTHRITIS OF LEFT KNEE: ICD-10-CM

## 2020-07-01 DIAGNOSIS — M25.561 RIGHT KNEE PAIN, UNSPECIFIED CHRONICITY: Primary | ICD-10-CM

## 2020-07-01 DIAGNOSIS — Z01.89 ENCOUNTER FOR LOWER EXTREMITY COMPARISON IMAGING STUDY: ICD-10-CM

## 2020-07-01 DIAGNOSIS — M22.2X2 PATELLOFEMORAL DISORDER OF LEFT KNEE: ICD-10-CM

## 2020-07-01 PROCEDURE — 20610 DRAIN/INJ JOINT/BURSA W/O US: CPT | Performed by: ORTHOPAEDIC SURGERY

## 2020-07-01 PROCEDURE — 99214 OFFICE O/P EST MOD 30 MIN: CPT | Performed by: ORTHOPAEDIC SURGERY

## 2020-07-01 PROCEDURE — 73564 X-RAY EXAM KNEE 4 OR MORE: CPT

## 2020-07-01 RX ORDER — BUPIVACAINE HYDROCHLORIDE 2.5 MG/ML
4 INJECTION, SOLUTION INFILTRATION; PERINEURAL
Status: COMPLETED | OUTPATIENT
Start: 2020-07-01 | End: 2020-07-01

## 2020-07-01 RX ORDER — METHYLPREDNISOLONE ACETATE 40 MG/ML
1 INJECTION, SUSPENSION INTRA-ARTICULAR; INTRALESIONAL; INTRAMUSCULAR; SOFT TISSUE
Status: COMPLETED | OUTPATIENT
Start: 2020-07-01 | End: 2020-07-01

## 2020-07-01 RX ORDER — OXYBUTYNIN CHLORIDE 5 MG/1
5 TABLET, EXTENDED RELEASE ORAL DAILY
COMMUNITY
Start: 2020-05-01

## 2020-07-01 RX ADMIN — BUPIVACAINE HYDROCHLORIDE 4 ML: 2.5 INJECTION, SOLUTION INFILTRATION; PERINEURAL at 11:40

## 2020-07-01 RX ADMIN — METHYLPREDNISOLONE ACETATE 1 ML: 40 INJECTION, SUSPENSION INTRA-ARTICULAR; INTRALESIONAL; INTRAMUSCULAR; SOFT TISSUE at 11:40

## 2020-07-01 NOTE — PROGRESS NOTES
Orthopaedic Surgery - Office Note  Conchis Belle (61 y o  male)   : 1970   MRN: [id number]  Encounter Date: 2020    Chief Complaint   Patient presents with    Left Knee - Pain    Right Knee - Pain       Assessment / Plan  Bilateral patellofemoral syndrome    · CSI of both knees given  · Anti-inflammatories / ice prn  · Activity as tolerated  · Low-impact exercise  Return if symptoms worsen or fail to improve  History of Present Illness  Conchis Belle is a 53 y o  male who presents for f/u of bilateral patellofemoral pain and mild osteoarthritis  He has had success with CSI in the left knee and viscosupplement injections on the right knee  He is compliant with HEP  He is into cycling  He has some anterior knee pain and stiffness  Denies swelling or locking  Review of Systems  Pertinent items are noted in HPI  All other systems were reviewed and are negative  Physical Exam  BP (!) 158/113   Pulse 69   Ht 5' 7" (1 702 m)   Wt 84 8 kg (187 lb)   BMI 29 29 kg/m²   Cons: Appears well  No apparent distress  Psych: Alert  Oriented x3  Mood and affect normal   Eyes: PERRLA, EOMI  Resp: Normal effort  No audible wheezing or stridor  CV: Palpable pulse  No discernable arrhythmia  No LE edema  Lymph:  No palpable cervical, axillary, or inguinal lymphadenopathy  Skin: Warm  No palpable masses  No visible lesions  Neuro: Normal muscle tone  Normal and symmetric DTR's  Bilateral Knee Exam  Alignment:  Normal knee alignment  Inspection:  No swelling  No muscle atrophy  Palpation:  mild le-patellar tenderness  No effusion  No warmth  ROM:  Knee Extension 0  Knee Flexion 135  Strength:  Quadriceps 5/5  Hamstrings 5/5  Stability:  No objective knee instability  Stable Varus / Valgus stress, Lachman, and Posterior drawer  Tests:  (-) Jadiel  Patella:  Patella tracks centrally without crepitus  Neurovascular:  Sensation intact in DP/SP/Bucio/Sa/T nerve distributions    2+ DP & PT pulses  Gait:  Normal       Studies Reviewed  I have personally reviewed pertinent films in PACS  XR of bilateral knee - mild degenerative changes  normal alignment    Large joint arthrocentesis: R knee  Date/Time: 7/1/2020 11:40 AM  Consent given by: patient  Supporting Documentation  Indications: pain   Procedure Details  Location: knee - R knee  Preparation: Alcohol  Needle size: 22 G  Approach: superolateral   Medications administered: 4 mL bupivacaine 0 25 %; 1 mL methylPREDNISolone acetate 40 mg/mL    Patient tolerance: patient tolerated the procedure well with no immediate complications  Dressing:  Sterile dressing applied    Large joint arthrocentesis: L knee  Date/Time: 7/1/2020 11:40 AM  Consent given by: patient  Supporting Documentation  Indications: pain   Procedure Details  Location: knee - L knee  Preparation: Alcohol  Needle size: 22 G  Approach: superolateral   Medications administered: 4 mL bupivacaine 0 25 %; 1 mL methylPREDNISolone acetate 40 mg/mL    Patient tolerance: patient tolerated the procedure well with no immediate complications  Dressing:  Sterile dressing applied             Medical, Surgical, Family, and Social History  The patient's medical history, family history, and social history, were reviewed and updated as appropriate      Past Medical History:   Diagnosis Date    Chronic back pain     Fibromyalgia     Hypertension     RA [facility]rheumatoid arthritis) ([facility] [address] )        Past Surgical History:   Procedure Laterality Date    CARPAL TUNNEL RELEASE Left     LATERAL EPICONDYLE RELEASE Right 2/25/2016    Procedure: RELEASE EPICONDYLAR ELBOW, LATERAL;  Surgeon: Anabel Albarado MD;  [facility];  Service:     UT REPAIR ACHILLES TENDON,PRIMARY Left 4/2/2018    Procedure: REPAIR TENDON ACHILLES;  Surgeon: Genna Esparza MD;  [facility];  Service: Orthopedics    UT TENOTOMY ELBOW LATERAL/MEDIAL DEBRIDE OPEN Left 2/12/2019    Procedure: RELEASE EPICONDYLAR ELBOW; Surgeon: Keven Escalante MD;  [facility];  Service: Orthopedics    ROTATOR CUFF REPAIR Left     ULNAR TUNNEL RELEASE Left     x2       Family History   Problem Relation Age of Onset    Hypertension Mother     Diabetes Mother        Social History     Occupational History    Not on file   Tobacco Use    Smoking status: Never Smoker    Smokeless tobacco: Never Used   Substance and Sexual Activity    Alcohol use: No    Drug use: No    Sexual activity: Yes       Allergies   Allergen Reactions    Lisinopril      Other reaction(s):  Other (See Comments)  Cough and rash         Reglan [Metoclopramide] Other (See Comments)     Restless; akisthesia           Current Outpatient Medications:     amLODIPine (NORVASC) 10 mg tablet, Take 10 mg by mouth every evening  , Disp: , Rfl:     Butalbital-APAP-Caffeine (FIORICET PO), Take 1 tablet by mouth daily as needed Unknown dose  , Disp: , Rfl:     dexamethasone (DECADRON) 4 mg/mL, 1 mL (4 mg total) by Iontophoresis route 3 (three) times a week, Disp: 30 mL, Rfl: 1    ibuprofen (MOTRIN) 600 mg tablet, Take 1 tablet (600 mg total) by mouth every 6 (six) hours, Disp: 30 tablet, Rfl: 0    levothyroxine 25 mcg tablet, , Disp: , Rfl: 0    Losartan Potassium (COZAAR PO), Take 100 mg by mouth daily  , Disp: , Rfl:     meloxicam (MOBIC) 15 mg tablet, Take 15 mg by mouth, Disp: , Rfl:     methocarbamol (ROBAXIN) 750 mg tablet, Take 750 mg by mouth 4 (four) times a day, Disp: , Rfl:     metoprolol succinate (TOPROL-XL) 50 mg 24 hr tablet, "I take one a day bedtime", Disp: , Rfl:     oxybutynin (DITROPAN-XL) 5 mg 24 hr tablet, Take 5 mg by mouth daily, Disp: , Rfl:     traMADol (ULTRAM) 50 mg tablet, Take 50 mg by mouth every 6 (six) hours as needed for moderate pain, Disp: , Rfl:     gabapentin (NEURONTIN) 300 mg capsule, One po qhs x 3 days, then one po bid x 3 days, then one po tid , Disp: , Rfl:       Yonas Morales MD    Scribe Attestation    I,: am acting as a scribe while in the presence of the attending physician :        I,:    personally performed the services described in this documentation    as scribed in my presence :

## 2020-08-04 ENCOUNTER — TELEPHONE (OUTPATIENT)
Dept: OBGYN CLINIC | Facility: OTHER | Age: 50
End: 2020-08-04

## 2020-08-05 ENCOUNTER — OFFICE VISIT (OUTPATIENT)
Dept: OBGYN CLINIC | Facility: OTHER | Age: 50
End: 2020-08-05
Payer: COMMERCIAL

## 2020-08-05 VITALS
TEMPERATURE: 96.1 F | SYSTOLIC BLOOD PRESSURE: 184 MMHG | HEART RATE: 59 BPM | WEIGHT: 187 LBS | HEIGHT: 67 IN | DIASTOLIC BLOOD PRESSURE: 121 MMHG | BODY MASS INDEX: 29.35 KG/M2

## 2020-08-05 DIAGNOSIS — G89.29 CHRONIC PAIN OF RIGHT KNEE: ICD-10-CM

## 2020-08-05 DIAGNOSIS — M17.11 PRIMARY OSTEOARTHRITIS OF RIGHT KNEE: ICD-10-CM

## 2020-08-05 DIAGNOSIS — M54.16 RADICULOPATHY, LUMBAR REGION: Primary | ICD-10-CM

## 2020-08-05 DIAGNOSIS — M25.561 CHRONIC PAIN OF RIGHT KNEE: ICD-10-CM

## 2020-08-05 PROCEDURE — 99213 OFFICE O/P EST LOW 20 MIN: CPT | Performed by: ORTHOPAEDIC SURGERY

## 2020-08-05 NOTE — PATIENT INSTRUCTIONS
Intra-articular Hyaluronic Acid Injection  Intra-articular Hyaluronic Acid Injection Brands  There are several types of Intra-articular Hyaluronic Acid Injections which vary in brand, dosage, and frequency  There are single dose injections as well as a series of injections  Your provider will choose the injection brand and series based on clinical factors as well as what your insurance company prefers or covers  Buy and Bill vs  Specialty Pharmacy  Injections may be obtained in two ways:   Buy and Bill: The insurance is requiring the office to buy the injection medication and bill the patients insurance for both the injection medication and the office visit   Specialty Pharmacy: The insurance is requiring the office to order the medication from the insurances Specialty Pharmacy and the specialty pharmacy will bill the patients insurance directly for the injection medication  When a specialty pharmacy is used, the office cannot bill for the injection medication, the office can only bill for the office visit  (Examples of a specialty pharmacy include, but not limited to, [facility] , [address] [facility], [address] [place] [address])  Time Line Expectations  Your care team will work to ensure the injection(s) being ordered for you are authorized by your insurance and is obtained by the insurance plans preferred pharmacy  Your insurance plan may dictate the brand and dosage of the series  Due to the nature of obtaining an insurance authorization as well as working with the pharmacy, the authorization process may take up to 14 business days, sometimes longer if your insurance plan denies the injection authorization or if the pharmacy has delays  If your insurance plan denies the authorization our team will submit an appeal which may lengthen the wait time for the approval of your injection      Our injection authorization team will be in contact with you throughout the injection authorization process, however, please note there may be times of silence while we wait for insurance and pharmacy updates  Your injection appointment(s) will be scheduled once our injection authorization team has received approval from your insurance plan and/or from the pharmacy  Please note: Specialty pharmacies are often delayed when obtaining authorizations and verifying benefits for injection medications  You may receive a phone call from the specialty pharmacy to authorize the medication; so it is important to accept calls from the specialty pharmacy to ensure your injections are not delayed

## 2020-08-05 NOTE — TELEPHONE ENCOUNTER
Called patient and left message to reschedule his appointment with Dr Lindalee Cockayne for tomorrow at [facility]

## 2020-08-05 NOTE — PROGRESS NOTES
Orthopaedic Surgery - Office Note  Lukas Ramirez (19 y o  male)   : 1970   MRN: [id number]  Encounter Date: 2020    Chief Complaint   Patient presents with    Left Knee - Follow-up    Right Knee - Follow-up       Assessment / Plan  Bilateral patellofemoral pain and mild OA     · Patient has had minimal relief witht e cortisone injectoins  · Due to visco injections helping with the left knee pain in the past, he would like to try the visco for the right knee at this time  · He has some overlapping lumbar radiculopathy that he hasn't treated in a long time  He notes that he gets numbness from the waist down after he cycles  · Refer to Pain management for further treatment  Return for Visco injections  History of Present Illness  Lukas Ramirez is a 53 y o  male who presents today for a follow up for his bilateral patellofemoral pain and mild OA  At his last visit, 2020 he had bilateral cortisone injections lasted about 4 weeks  He is a cyclist  He does inside training where he gets numbness and tingling down the legs that include his groin/ genitals  He has never had treatment for his back  He has some anterior knee pain and stiffness  Denies swelling or locking  He is wearing brace on left ankle  Hx of a left achilles tear  Review of Systems  Pertinent items are noted in HPI  All other systems were reviewed and are negative  Physical Exam  BP (!) 184/121   Pulse 59   Temp (!) 96 1 °F (35 6 °C)   Ht 5' 7"   Wt 84 8 kg (187 lb)   BMI 29 29 kg/m²   Cons: Appears well  No apparent distress  Psych: Alert  Oriented x3  Mood and affect normal   Eyes: PERRLA, EOMI  Resp: Normal effort  No audible wheezing or stridor  CV: Palpable pulse  No discernable arrhythmia  No LE edema  Lymph:  No palpable cervical, axillary, or inguinal lymphadenopathy  Skin: Warm  No palpable masses  No visible lesions  Neuro: Normal muscle tone  Normal and symmetric DTR's       Right Knee Exam  Alignment:  Normal knee alignment  Inspection:  No swelling  No edema  No erythema  No ecchymosis  No muscle atrophy  No deformity  Palpation:  mild tenderness at patella  No effusion  No warmth  patella crepitus  medial and lateral hamstring tendons  ROM:  Knee Extension 0  Knee Flexion 140  Strength:  Quadriceps 5/5  Hamstrings 4+/5  Able to SLR without lag  Anterior tibialis 4+/5  Gastroc/Soleus 5/5  Stability:  No objective knee instability  Stable Varus / Valgus stress, Lachman, and Posterior drawer  Tests:  No pertinent positive or negative tests  Patella:  Patella tracks centrally without crepitus  Neurovascular:  Sensation intact in DP/SP/Bucio/Sa/T nerve distributions  Sensation intact to light touch in all other peripheral nerve distributions  2+ DP & PT pulses  Gait:  Normal      Left Knee Exam  Alignment:  Normal knee alignment  Inspection:  No swelling  No edema  No erythema  No ecchymosis  No muscle atrophy  No deformity  Palpation:  mild tenderness at patella  No effusion  No warmth  patella crepitus  medial and lateral hamstring tendons  ROM:  Knee Extension 0  Knee Flexion 140  Strength:  5/5 quadriceps and hamstrings  Able to SLR without lag  5/5 AT, GSC, PT, and peroneals  Stability:  No objective knee instability  Stable Varus / Valgus stress, Lachman, and Posterior drawer  Tests:  No pertinent positive or negative tests  Patella:  Patella tracks centrally without crepitus  Neurovascular:  Sensation intact in DP/SP/Bucio/Sa/T nerve distributions  Sensation intact to light touch in all other peripheral nerve distributions  2+ DP & PT pulses  Gait:  Normal     Studies Reviewed  XR of bilateral knee -  mild OA of the medial comaprtment of the right and left knee    Procedures  No procedures today  Medical, Surgical, Family, and Social History  The patient's medical history, family history, and social history, were reviewed and updated as appropriate      Past Medical History: Diagnosis Date    Chronic back pain     Fibromyalgia     Hypertension     RA [facility]rhe[facility]oid [facility]) ([facility] [address] )        Past Surgical History:   Procedure Laterality Date    CARPAL TUNNEL RELEASE Left     LATERAL EPICONDYLE RELEASE Right 2/25/2016    Procedure: RELEASE EPICONDYLAR ELBOW, LATERAL;  Surgeon: Florida Cabral MD;  [facility];  Service:     ID REPAIR ACHILLES TENDON,PRIMARY Left 4/2/2018    Procedure: REPAIR TENDON ACHILLES;  Surgeon: Michael Garcia MD;  [facility];  Service: Orthopedics    ID TENOTOMY ELBOW LATERAL/MEDIAL DEBRIDE OPEN Left 2/12/2019    Procedure: RELEASE EPICONDYLAR ELBOW;  Surgeon: Florida Cabral MD;  [facility];  Service: Orthopedics    ROTATOR CUFF REPAIR Left     ULNAR TUNNEL RELEASE Left     x2       Family History   Problem Relation Age of Onset    Hypertension Mother     Diabetes Mother        Social History     Occupational History    Not on file   Tobacco Use    Smoking status: Never Smoker    Smokeless tobacco: Never Used   Substance and Sexual Activity    Alcohol use: No    Drug use: No    Sexual activity: Yes       Allergies   Allergen Reactions    Lisinopril      Other reaction(s):  Other (See Comments)  Cough and rash         Reglan [Metoclopramide] Other (See Comments)     Restless; akisthesia           Current Outpatient Medications:     amLODIPine (NORVASC) 10 mg tablet, Take 10 mg by mouth every evening  , Disp: , Rfl:     Butalbital-APAP-Caffeine (FIORICET PO), Take 1 tablet by mouth daily as needed Unknown dose  , Disp: , Rfl:     dexamethasone (DECADRON) 4 mg/mL, 1 mL (4 mg total) by Iontophoresis route 3 (three) times a week, Disp: 30 mL, Rfl: 1    gabapentin (NEURONTIN) 300 mg capsule, One po qhs x 3 days, then one po bid x 3 days, then one po tid , Disp: , Rfl:     ibuprofen (MOTRIN) 600 mg tablet, Take 1 tablet (600 mg total) by mouth every 6 (six) hours, Disp: 30 tablet, Rfl: 0    levothyroxine 25 mcg tablet, , Disp: , Rfl: 0    Losartan Potassium (COZAAR PO), Take 100 mg by mouth daily  , Disp: , Rfl:     meloxicam (MOBIC) 15 mg tablet, Take 15 mg by mouth, Disp: , Rfl:     methocarbamol (ROBAXIN) 750 mg tablet, Take 750 mg by mouth 4 (four) times a day, Disp: , Rfl:     metoprolol succinate (TOPROL-XL) 50 mg 24 hr tablet, "I take one a day bedtime", Disp: , Rfl:     oxybutynin (DITROPAN-XL) 5 mg 24 hr tablet, Take 5 mg by mouth daily, Disp: , Rfl:     traMADol (ULTRAM) 50 mg tablet, Take 50 mg by mouth every 6 (six) hours as needed for moderate pain, Disp: , Rfl:         Scribe Attestation    I,:   Kacey Machado am acting as a scribe while in the presence of the attending physician :        I,:   Prudencio Romero MD personally performed the services described in this documentation    as scribed in my presence :

## 2020-08-21 ENCOUNTER — CONSULT (OUTPATIENT)
Dept: PAIN MEDICINE | Facility: CLINIC | Age: 50
End: 2020-08-21
Payer: COMMERCIAL

## 2020-08-21 VITALS
WEIGHT: 191 LBS | HEIGHT: 67 IN | BODY MASS INDEX: 29.98 KG/M2 | SYSTOLIC BLOOD PRESSURE: 166 MMHG | DIASTOLIC BLOOD PRESSURE: 103 MMHG | HEART RATE: 53 BPM | TEMPERATURE: 98.7 F

## 2020-08-21 DIAGNOSIS — M47.812 SPONDYLOSIS OF CERVICAL SPINE WITHOUT MYELOPATHY: ICD-10-CM

## 2020-08-21 DIAGNOSIS — M54.2 NECK PAIN: ICD-10-CM

## 2020-08-21 DIAGNOSIS — M54.16 RADICULOPATHY, LUMBAR REGION: Primary | ICD-10-CM

## 2020-08-21 DIAGNOSIS — G89.4 CHRONIC PAIN SYNDROME: ICD-10-CM

## 2020-08-21 DIAGNOSIS — M51.36 DDD (DEGENERATIVE DISC DISEASE), LUMBAR: ICD-10-CM

## 2020-08-21 DIAGNOSIS — M47.816 LUMBAR SPONDYLOSIS: ICD-10-CM

## 2020-08-21 DIAGNOSIS — M54.12 CERVICAL RADICULOPATHY: ICD-10-CM

## 2020-08-21 PROCEDURE — 99244 OFF/OP CNSLTJ NEW/EST MOD 40: CPT | Performed by: ANESTHESIOLOGY

## 2020-08-21 RX ORDER — PANTOPRAZOLE SODIUM 40 MG/1
40 TABLET, DELAYED RELEASE ORAL DAILY
COMMUNITY
Start: 2020-07-24 | End: 2022-03-31

## 2020-08-21 RX ORDER — DULOXETIN HYDROCHLORIDE 30 MG/1
30 CAPSULE, DELAYED RELEASE ORAL DAILY
Qty: 30 CAPSULE | Refills: 1 | Status: SHIPPED | OUTPATIENT
Start: 2020-08-21 | End: 2020-09-18
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
## 2020-08-21 NOTE — PATIENT INSTRUCTIONS
Duloxetine (By mouth)   Duloxetine ([id number]-LOX-[id number])  Treats depression, anxiety, diabetic peripheral neuropathy, fibromyalgia, and chronic muscle or bone pain  This medicine is an SSNRI  Brand Name(s): Cymbalta, DermacinRx [place] Romero   There may be other brand names for this medicine  When This Medicine Should Not Be Used: This medicine is not right for everyone  Do not use it if you had an allergic reaction to duloxetine  How to Use This Medicine:   Capsule, Delayed Release Capsule  · Take your medicine as directed  Your dose may need to be changed several times to find what works best for you  · Delayed-release capsule: Swallow the capsule whole  Do not crush, chew, break, or open it  · This medicine should come with a Medication Guide  Ask your pharmacist for a copy if you do not have one  · Missed dose: Take a dose as soon as you remember  If it is almost time for your next dose, wait until then and take a regular dose  Do not take extra medicine to make up for a missed dose  · Store the medicine in a closed container at room temperature, away from heat, moisture, and direct light  Drugs and Foods to Avoid:   Ask your doctor or pharmacist before using any other medicine, including over-the-counter medicines, vitamins, and herbal products  · Do not take duloxetine if you have used an MAO inhibitor (MAOI) within the past 14 days  Do not start taking an MAO inhibitor within 5 days of stopping duloxetine  · Some medicines can affect how duloxetine works   Tell your doctor if you are using any of the following:  ¨ Buspirone, cimetidine, ciprofloxacin, enoxacin, fentanyl, lithium, Phillip's wort, theophylline, tramadol, tryptophan, or warfarin  ¨ Amphetamines  ¨ Blood pressure medicine  ¨ Diuretic (water pill)  ¨ Medicine for heart rhythm problems (including flecainide, propafenone, quinidine)  ¨ Medicine to treat migraine headaches (including triptans)  ¨ NSAID pain or arthritis medicine (including aspirin, celecoxib, diclofenac, ibuprofen, naproxen)  ¨ Other medicine to treat depression or mood disorders (including amitriptyline, desipramine, fluoxetine, imipramine, nortriptyline, paroxetine)  ¨ Phenothiazine medicine (including thioridazine)  · Tell your doctor if you use anything else that makes you sleepy  Some examples are allergy medicine, narcotic pain medicine, and alcohol  · Do not drink alcohol while you are using this medicine  Warnings While Using This Medicine:   · Tell your doctor if you are pregnant or breastfeeding, or if you have kidney disease, liver disease, diabetes, digestion problems, glaucoma, heart disease, high or low blood pressure, or problems with urination  Tell your doctor if you smoke or you have a history of seizures, or drug or alcohol addiction  · This medicine may cause the following problems:   ¨ Serious liver problems  ¨ Serotonin syndrome (more likely when used with certain other medicines)  ¨ Increased risk of bleeding problems  ¨ Serious skin reactions  ¨ Low sodium levels in the blood  · This medicine can increase thoughts of suicide  Tell your doctor right away if you start to feel depressed and have thoughts about hurting yourself  · This medicine can cause changes in your blood pressure  This may make you dizzy or drowsy  Do not drive or do anything that could be dangerous until you know how this medicine affects you  Stand up slowly to avoid falls  · Do not stop using this medicine suddenly  Your doctor will need to slowly decrease your dose before you stop it completely  · Your doctor will check your progress and the effects of this medicine at regular visits  Keep all appointments  · Keep all medicine out of the reach of children  Never share your medicine with anyone    Possible Side Effects While Using This Medicine:   Call your doctor right away if you notice any of these side effects:  · Allergic reaction: Itching or hives, swelling in your face or hands, swelling or tingling in your mouth or throat, chest tightness, trouble breathing  · Anxiety, restlessness, fever, fast heartbeat, sweating, muscle spasms, diarrhea, seeing or hearing things that are not there  · Blistering, peeling, red skin rash  · Confusion, weakness, muscle twitching  · Dark urine or pale stools, nausea, vomiting, loss of appetite, stomach pain, yellow skin or eyes  · Decrease in how much or how often you urinate  · Eye pain, vision changes, seeing halos around lights  · Feeling more energetic than usual  · Lightheadedness, dizziness, or fainting  · Unusual moods or behaviors, worsening depression, thoughts about hurting yourself, trouble sleeping  · Unusual bleeding or bruising  If you notice these less serious side effects, talk with your doctor:   · Decrease in appetite or weight  · Dry mouth, constipation, mild nausea  · Unusual drowsiness, sleepiness, or tiredness  If you notice other side effects that you think are caused by this medicine, tell your doctor  Call your doctor for medical advice about side effects  You may report side effects to FDA at 7-581-FDA-2686  © 2017 [employer] Information is for End User's use only and may not be sold, redistributed or otherwise used for commercial purposes  The above information is an  only  It is not intended as medical advice for individual conditions or treatments  Talk to your doctor, nurse or pharmacist before following any medical regimen to see if it is safe and effective for you

## 2020-09-02 ENCOUNTER — HOSPITAL ENCOUNTER (OUTPATIENT)
Dept: RADIOLOGY | Age: 50
Discharge: HOME | End: 2020-09-02
Payer: COMMERCIAL

## 2020-09-02 DIAGNOSIS — M54.16 RADICULOPATHY, LUMBAR REGION: ICD-10-CM

## 2020-09-02 DIAGNOSIS — M54.12 CERVICAL RADICULOPATHY: ICD-10-CM

## 2020-09-02 PROCEDURE — G1004 CDSM NDSC: HCPCS

## 2020-09-02 PROCEDURE — 72148 MRI LUMBAR SPINE W/O DYE: CPT

## 2020-09-02 PROCEDURE — 72141 MRI NECK SPINE W/O DYE: CPT

## 2020-09-03 ENCOUNTER — TELEPHONE (OUTPATIENT)
Dept: PAIN MEDICINE | Facility: CLINIC | Age: 50
End: 2020-09-03

## 2020-09-03 DIAGNOSIS — M54.16 RADICULOPATHY, LUMBAR REGION: Primary | ICD-10-CM

## 2020-09-03 NOTE — TELEPHONE ENCOUNTER
Called patient personally and spoke with him regarding the results of his MRI of cervical and lumbar spine  The patient is interested in pursuing lumbar epidural steroid injection  Please schedule the patient for L4-5 LESI  Order placed

## 2020-09-04 ENCOUNTER — TELEPHONE (OUTPATIENT)
Dept: OBGYN CLINIC | Facility: HOSPITAL | Age: 50
End: 2020-09-04

## 2020-09-04 NOTE — TELEPHONE ENCOUNTER
Left voice message for patient to call  back regarding his appointment on 9/8/20  If the patient calls back please do COVID screening and document on the appointment line      Thank you

## 2020-09-04 NOTE — TELEPHONE ENCOUNTER
Patient contacted and scheduled for  L4-5 LESI  All pre procedure instructions were given to patient   Nothing to eat or drink for 1 hour prior  Loose fitting clothing   Denies NSAIDS   Denies Antibx   Needs    Patient instructed to contact our office if becomes sick   Refrain from any vaccines 2 weeks before & 2 weeks after  Insurance auth received but is not a guarantee of payment per your insurance company's authorization disclaimer and it is your responsibility to verify your benefits   COVID -19 screening complete

## 2020-09-08 ENCOUNTER — OFFICE VISIT (OUTPATIENT)
Dept: OBGYN CLINIC | Facility: HOSPITAL | Age: 50
End: 2020-09-08
Payer: COMMERCIAL

## 2020-09-08 VITALS
HEART RATE: 60 BPM | SYSTOLIC BLOOD PRESSURE: 148 MMHG | BODY MASS INDEX: 29.6 KG/M2 | DIASTOLIC BLOOD PRESSURE: 95 MMHG | WEIGHT: 189 LBS

## 2020-09-08 DIAGNOSIS — M76.61 ACHILLES TENDINITIS, RIGHT LEG: Primary | ICD-10-CM

## 2020-09-08 PROCEDURE — 99213 OFFICE O/P EST LOW 20 MIN: CPT | Performed by: ORTHOPAEDIC SURGERY

## 2020-09-08 NOTE — PROGRESS NOTES
Orthopedics          Phillip Bhat 53 y o  male MRN: [id number]      Chief Complaint:   right heel pain    HPI:   53 y o male complaining of right heel pain  Patient states he has right heel pain worsening over the past 2-3 year's time  Patient does have a history of a traumatic rupture of his left Achilles tendon 2-3 years ago with operative fixation  Patient states his right heel continues to be problematic limit his activities  He notes pain posterior aspect of his right heel  He states his limits his activities such as running jogging and cycling  Patient also has issues in his lumbar spine which denies by another physician causing issues of numbness tingling is lower extremities  Patient takes meloxicam for pain control                  Review Of Systems:   · Skin: Normal  · Neuro: See HPI  · Musculoskeletal: See HPI  · All other systems reviewed and are negative    Past Medical History:   Past Medical History:   Diagnosis Date    Chronic back pain     Fibromyalgia     Hypertension     RA (rheumatoid arthritis) ([facility] [address] )        Past Surgical History:   Past Surgical History:   Procedure Laterality Date    CARPAL TUNNEL RELEASE Left     LATERAL EPICONDYLE RELEASE Right 2/25/2016    Procedure: RELEASE EPICONDYLAR ELBOW, LATERAL;  Surgeon: Rosa M Aguirre MD;  [facility];  Service:     [place] REPAIR ACHILLES TENDON,PRIMARY Left 4/2/2018    Procedure: REPAIR TENDON ACHILLES;  Surgeon: Ernesto Love MD;  [facility];  Service: Orthopedics    [place] TENOTOMY ELBOW LATERAL/MEDIAL DEBRIDE OPEN Left 2/12/2019    Procedure: RELEASE EPICONDYLAR ELBOW;  Surgeon: Rosa M Aguirre MD;  [facility];  Service: Orthopedics    ROTATOR CUFF REPAIR Left     ULNAR TUNNEL RELEASE Left     x2       Family History:  Family history reviewed and non-contributory  Family History   Problem Relation Age of Onset    Hypertension Mother     Diabetes Mother          Social History:  Social History Socioeconomic History    Marital status: Single     Spouse name: None    Number of children: None    Years of education: None    Highest education level: None   Occupational History    None   Social Needs    Financial resource strain: None    Food insecurity     Worry: None     Inability: None    Transportation needs     Medical: None     Non-medical: None   Tobacco Use    Smoking status: Never Smoker    Smokeless tobacco: Never Used   Substance and Sexual Activity    Alcohol use: No    Drug use: No    Sexual activity: Yes   Lifestyle    Physical activity     Days per week: None     Minutes per session: None    Stress: None   Relationships    Social connections     Talks on phone: None     Gets together: None     Attends Sabianist service: None     Active member of club or organization: None     Attends meetings of clubs or organizations: None     Relationship status: None    Intimate partner violence     Fear of current or ex partner: None     Emotionally abused: None     Physically abused: None     Forced sexual activity: None   Other Topics Concern    None   Social History Narrative    None       Allergies: Allergies   Allergen Reactions    Lisinopril      Other reaction(s):  Other (See Comments)  Cough and rash         Reglan [Metoclopramide] Other (See Comments)     Restless; akisthesia         Labs:  0   Lab Value Date/Time    HCT 48 0 02/09/2018 2239    HCT 46 7 01/27/2016 1237    HCT 47 8 10/27/2015 1646    HCT 47 5 03/25/2014 1033    HGB 17 1 (H) 02/09/2018 2239    HGB 16 1 01/27/2016 1237    HGB 17 0 10/27/2015 1646    HGB 16 3 03/25/2014 1033    INR 1 04 04/02/2018 0745    INR 0 99 10/27/2015 1646    WBC 5 33 02/09/2018 2239    WBC 6 72 01/27/2016 1237    WBC 10 38 (H) 10/27/2015 1646    WBC 12 30 (H) 03/25/2014 1033       Meds:    Current Outpatient Medications:     amLODIPine (NORVASC) 10 mg tablet, Take 10 mg by mouth every evening  , Disp: , Rfl:    Butalbital-APAP-Caffeine (FIORICET PO), Take 1 tablet by mouth daily as needed Unknown dose  , Disp: , Rfl:     dexamethasone (DECADRON) 4 mg/mL, 1 mL (4 mg total) by Iontophoresis route 3 (three) times a week, Disp: 30 mL, Rfl: 1    DULoxetine (CYMBALTA) 30 mg delayed release capsule, Take 1 capsule (30 mg total) by mouth daily, Disp: 30 capsule, Rfl: 1    levothyroxine 25 mcg tablet, , Disp: , Rfl: 0    Losartan Potassium (COZAAR PO), Take 100 mg by mouth daily  , Disp: , Rfl:     meloxicam (MOBIC) 15 mg tablet, Take 15 mg by mouth, Disp: , Rfl:     metoprolol succinate (TOPROL-XL) 50 mg 24 hr tablet, "I take one a day bedtime", Disp: , Rfl:     oxybutynin (DITROPAN-XL) 5 mg 24 hr tablet, Take 5 mg by mouth daily, Disp: , Rfl:     pantoprazole (PROTONIX) 40 mg tablet, Take 40 mg by mouth daily, Disp: , Rfl:     traMADol (ULTRAM) 50 mg tablet, Take 50 mg by mouth every 6 (six) hours as needed for moderate pain, Disp: , Rfl:       Physical Exam:     General Appearance:    Alert, cooperative, no distress, appears stated age   Head:    Normocephalic, without obvious abnormality, atraumatic   Eyes:    conjunctiva/corneas clear, both eyes        Nose:   Nares normal, septum midline, no drainage    Throat:   Lips normal; teeth and gums normal   Neck:    symmetrical, trachea midline, ;     thyroid:  no enlargement/   Back:     Symmetric, no curvature, ROM normal   Lungs:   No audible wheezing or labored breathing   Chest Wall:    No tenderness or deformity    Heart:    Regular rate and rhythm               Pulses:   2+ and symmetric all extremities   Skin:   Skin color, texture, turgor normal, no rashes or lesions   Neurologic:   normal strength, sensation and reflexes     throughout       Musculoskeletal: right lower extremity  · Examination patient's right lower extremity pain palpation of right Achilles tendon no erythema no swelling no tenderness to palpation insertion of the Achilles tendon ankle dorsi plantar flexion within normal limits only minimal pain with resisted dorsi and plantar flexion localized in the posterior aspect of the patient's heel sensation intact distal pulses present right lower extremity        _*_*_*_*_*_*_*_*_*_*_*_*_*_*_*_*_*_*_*_*_*_*_*_*_*_*_*_*_*_*_*_*_*_*_*_*_*_*_*_*_*    Assessment:  50 y o male with right Achilles tendinitis    Plan:   · Weight bearing as tolerated  right lower extremity  · Case discussed with Dr Caio Solares  · Advised patient to continue to monitor activities and limit them based on pain of his right Achilles tendon  · Continue home range of motion stretching strengthening exercise  · Advised patient follow-up with Dr Marshall Xie regarding possible further treatments his Achilles tendinitis  · Follow up with Dr aCio Solares on an as-needed basis      Olivia Garrido PA-C

## 2020-09-16 ENCOUNTER — HOSPITAL ENCOUNTER (OUTPATIENT)
Dept: RADIOLOGY | Facility: CLINIC | Age: 50
Discharge: HOME | End: 2020-09-16
Attending: ANESTHESIOLOGY | Admitting: ANESTHESIOLOGY
Payer: COMMERCIAL

## 2020-09-16 VITALS
SYSTOLIC BLOOD PRESSURE: 167 MMHG | OXYGEN SATURATION: 96 % | TEMPERATURE: 98.6 F | DIASTOLIC BLOOD PRESSURE: 101 MMHG | RESPIRATION RATE: 20 BRPM | HEART RATE: 64 BPM

## 2020-09-16 DIAGNOSIS — M54.16 RADICULOPATHY, LUMBAR REGION: ICD-10-CM

## 2020-09-16 PROCEDURE — 62323 NJX INTERLAMINAR LMBR/SAC: CPT | Performed by: ANESTHESIOLOGY

## 2020-09-16 RX ORDER — METHYLPREDNISOLONE ACETATE 80 MG/ML
80 INJECTION, SUSPENSION INTRA-ARTICULAR; INTRALESIONAL; INTRAMUSCULAR; PARENTERAL; SOFT TISSUE ONCE
Status: COMPLETED | OUTPATIENT
Start: 2020-09-16 | End: 2020-09-16

## 2020-09-16 RX ORDER — LIDOCAINE HYDROCHLORIDE 10 MG/ML
5 INJECTION, SOLUTION EPIDURAL; INFILTRATION; INTRACAUDAL; PERINEURAL ONCE
Status: COMPLETED | OUTPATIENT
Start: 2020-09-16 | End: 2020-09-16

## 2020-09-16 RX ADMIN — LIDOCAINE HYDROCHLORIDE 2 ML: 10 INJECTION, SOLUTION EPIDURAL; INFILTRATION; INTRACAUDAL; PERINEURAL at 09:25

## 2020-09-16 RX ADMIN — METHYLPREDNISOLONE ACETATE 80 MG: 80 INJECTION, SUSPENSION INTRA-ARTICULAR; INTRALESIONAL; INTRAMUSCULAR; SOFT TISSUE at 09:27

## 2020-09-16 RX ADMIN — IOHEXOL 1 ML: 300 INJECTION, SOLUTION INTRAVENOUS at 09:27

## 2020-09-16 NOTE — H&P
History of Present Illness: The patient is a 53 y o  male who presents with complaints of low back and leg pain      Patient Active Problem List   Diagnosis    Patellofemoral disorder of left knee    Injury of left Achilles tendon    Lateral epicondylitis of left elbow    Impingement syndrome of right shoulder    Supraspinatus tendon tear, right, subsequent encounter    Pain in left elbow    Primary osteoarthritis of left knee    Chronic pain of left knee    Radiculopathy, lumbar region    DDD (degenerative disc disease), lumbar    Lumbar spondylosis    Neck pain    Cervical radiculopathy    Spondylosis of cervical spine without myelopathy    Chronic pain syndrome       Past Medical History:   Diagnosis Date    Chronic back pain     Fibromyalgia     Hypertension     RA [facility]rheumatoid arthritis) ([facility] [address] )        Past Surgical History:   Procedure Laterality Date    CARPAL TUNNEL RELEASE Left     LATERAL EPICONDYLE RELEASE Right 2/25/2016    Procedure: RELEASE EPICONDYLAR ELBOW, LATERAL;  Surgeon: Lynn Guzman MD;  [facility];  Service:     [place] REPAIR ACHILLES TENDON,PRIMARY Left 4/2/2018    Procedure: REPAIR TENDON ACHILLES;  Surgeon: Sera Lancaster MD;  [facility];  Service: Orthopedics    [place] TENOTOMY ELBOW LATERAL/MEDIAL DEBRIDE OPEN Left 2/12/2019    Procedure: RELEASE EPICONDYLAR ELBOW;  Surgeon: Lynn Guzman MD;  [facility];  Service: Orthopedics    ROTATOR CUFF REPAIR Left     ULNAR TUNNEL RELEASE Left     x2         Current Outpatient Medications:     amLODIPine (NORVASC) 10 mg tablet, Take 10 mg by mouth every evening  , Disp: , Rfl:     Butalbital-APAP-Caffeine (FIORICET PO), Take 1 tablet by mouth daily as needed Unknown dose  , Disp: , Rfl:     dexamethasone (DECADRON) 4 mg/mL, 1 mL (4 mg total) by Iontophoresis route 3 (three) times a week, Disp: 30 mL, Rfl: 1    DULoxetine (CYMBALTA) 30 mg delayed release capsule, Take 1 capsule (30 mg total) by mouth daily, Disp: 30 capsule, Rfl: 1    levothyroxine 25 mcg tablet, , Disp: , Rfl: 0    Losartan Potassium (COZAAR PO), Take 100 mg by mouth daily  , Disp: , Rfl:     meloxicam (MOBIC) 15 mg tablet, Take 15 mg by mouth, Disp: , Rfl:     metoprolol succinate (TOPROL-XL) 50 mg 24 hr tablet, "I take one a day bedtime", Disp: , Rfl:     oxybutynin (DITROPAN-XL) 5 mg 24 hr tablet, Take 5 mg by mouth daily, Disp: , Rfl:     pantoprazole (PROTONIX) 40 mg tablet, Take 40 mg by mouth daily, Disp: , Rfl:     traMADol (ULTRAM) 50 mg tablet, Take 50 mg by mouth every 6 (six) hours as needed for moderate pain, Disp: , Rfl:     Allergies   Allergen Reactions    Lisinopril      Other reaction(s): Other (See Comments)  Cough and rash         Reglan [Metoclopramide] Other (See Comments)     Restless; akisthesia         Physical Exam:   Vitals:    09/16/20 0914   BP: (!) 166/107   Pulse:    Resp:    Temp:    SpO2:      General: Awake, Alert, Oriented x 3, Mood and affect appropriate  Respiratory: Respirations even and unlabored  Cardiovascular: Peripheral pulses intact; no edema  Musculoskeletal Exam:  Bilateral lumbar paraspinals tender to palpation  ASA Score: 3    Patient/Chart Verification  Patient ID Verified: Verbal  ID Band Applied: No  Consents Confirmed: Procedural  H&P( within 30 days) Verified: To be obtained in the Pre-Procedure area  Interval H&P(within 24 hr) Complete (required for Outpatients and Surgery Admit only): To be obtained in the Pre-Procedure area  Allergies Reviewed: Yes  Anticoag/NSAID held?: No  Currently on antibiotics?: No    Assessment:   1   Radiculopathy, lumbar region        Plan: L4-5 LESI

## 2020-09-16 NOTE — DISCHARGE INSTR - LAB
Epidural Steroid Injection   WHAT YOU NEED TO KNOW:   An epidural steroid injection (AMIRA) is a procedure to inject steroid medicine into the epidural space  The epidural space is between your spinal cord and vertebrae  Steroids reduce inflammation and fluid buildup in your spine that may be causing pain  You may be given pain medicine along with the steroids  ACTIVITY  · Do not drive or operate machinery today  · No strenuous activity today - bending, lifting, etc   · You may resume normal activites starting tomorrow - start slowly and as tolerated  · You may shower today, but no tub baths or hot tubs  · You may have numbness for several hours from the local anesthetic  Please use caution and common sense, especially with weight-bearing activities  CARE OF THE INJECTION SITE  · If you have soreness or pain, apply ice to the area today (20 minutes on/20 minutes off)  · Starting tomorrow, you may use warm, moist heat or ice if needed  · You may have an increase or change in your discomfort for 36-48 hours after your treatment  · Apply ice and continue with any pain medication you have been prescribed  · Notify [facility] if you have any of the following: redness, drainage, swelling, headache, stiff neck or fever above 100°F     SPECIAL INSTRUCTIONS  · Our office will contact you in approximately 7 days for a progress report  MEDICATIONS  · Continue to take all routine medications  · Our office may have instructed you to hold some medications  If you have a problem specifically related to your procedure, please call our office at [phone]  Problems not related to your procedure should be directed to your primary care physician

## 2020-09-18 ENCOUNTER — OFFICE VISIT (OUTPATIENT)
Dept: PAIN MEDICINE | Facility: CLINIC | Age: 50
End: 2020-09-18
Payer: COMMERCIAL

## 2020-09-18 VITALS
HEIGHT: 67 IN | HEART RATE: 60 BPM | WEIGHT: 189 LBS | SYSTOLIC BLOOD PRESSURE: 169 MMHG | BODY MASS INDEX: 29.66 KG/M2 | DIASTOLIC BLOOD PRESSURE: 113 MMHG | TEMPERATURE: 98.2 F

## 2020-09-18 DIAGNOSIS — M54.2 NECK PAIN: ICD-10-CM

## 2020-09-18 DIAGNOSIS — M25.562 CHRONIC PAIN OF LEFT KNEE: ICD-10-CM

## 2020-09-18 DIAGNOSIS — M54.16 RADICULOPATHY, LUMBAR REGION: ICD-10-CM

## 2020-09-18 DIAGNOSIS — G89.29 CHRONIC PAIN OF LEFT KNEE: ICD-10-CM

## 2020-09-18 DIAGNOSIS — M51.36 DDD (DEGENERATIVE DISC DISEASE), LUMBAR: ICD-10-CM

## 2020-09-18 DIAGNOSIS — G89.4 CHRONIC PAIN SYNDROME: Primary | ICD-10-CM

## 2020-09-18 DIAGNOSIS — M47.812 SPONDYLOSIS OF CERVICAL SPINE WITHOUT MYELOPATHY: ICD-10-CM

## 2020-09-18 DIAGNOSIS — M54.12 CERVICAL RADICULOPATHY: ICD-10-CM

## 2020-09-18 DIAGNOSIS — M47.816 LUMBAR SPONDYLOSIS: ICD-10-CM

## 2020-09-18 DIAGNOSIS — M75.41 IMPINGEMENT SYNDROME OF RIGHT SHOULDER: ICD-10-CM

## 2020-09-18 PROCEDURE — 99214 OFFICE O/P EST MOD 30 MIN: CPT | Performed by: NURSE PRACTITIONER

## 2020-09-18 RX ORDER — DULOXETIN HYDROCHLORIDE 60 MG/1
60 CAPSULE, DELAYED RELEASE ORAL DAILY
Qty: 30 CAPSULE | Refills: 1 | Status: SHIPPED | OUTPATIENT
Start: 2020-09-18 | End: 2020-10-28 | Stop reason: SDUPTHER

## 2020-09-18 NOTE — PROGRESS NOTES
Assessment:  1  Chronic pain syndrome    2  Radiculopathy, lumbar region    3  Cervical radiculopathy    4  DDD (degenerative disc disease), lumbar    5  Lumbar spondylosis    6  Spondylosis of cervical spine without myelopathy    7  Impingement syndrome of right shoulder    8  Chronic pain of left knee    9  Neck pain        Plan:  1  I will increase duloxetine to 60 mg daily for neuropathic complaints  I advised the patient that if they experience any side effects or issues with the changes in their medication regiment, they should give our office a call to discuss  I also advised the patient not to drive or operate machinery until they see how the changes in the medication regimen affects them  The patient was agreeable and verbalized an understanding  2  Will schedule the patient for cervical epidural steroid injection to address the inflammatory component the patient's pain  This will be scheduled at least 2 weeks after his LESI in  3  We may consider cervical and lumbar facet medial branch blocks in the future  4  The patient may continue meloxicam, methocarbamol and tramadol as prescribed  5  I will follow up with the patient 4 weeks after the procedure or sooner needed  M*Modal software was used to dictate this note  It may contain errors with dictating incorrect words or incorrect spelling  Please contact the provider directly with any questions  History of Present Illness: The patient is a 53 y o  male with a history of fibromyalgia and osteoarthritis of the bilateral knees last seen on 8/21/20 who presents for a follow up office visit in regards to chronic neck pain associated hand numbness and low back pain that radiates into the bilateral lower extremities with associated numbness, paresthesias and subjective weakness secondary to cervical degenerative disc disease, cervical spondylosis, cervical stenosis, lumbar degenerative disc disease, lumbar spondylosis and stenosis    The patient denies bowel or bladder incontinence or saddle anesthesia  The patient is status post L4-5 LESI with Dr Velia Saldaña on September 16, 2020  He has already noticed some improvement of his low back pain  MRI of the lumbar spine from September 2020 reveals mild canal and foraminal stenosis at L3-4, mild canal and moderate foraminal stenosis at L4-5, and mild facet arthropathy from L3-4 to L5-S1  MRI of the cervical spine reveals multilevel canal and foraminal stenosis from C3-4 to C7-T1, however C6-7 is normal     The patient states he has completed a full course of chiropractic therapy with transient relief  He has undergone epidural steroid injections and rhizotomy with variable relief  He has taking gabapentin in the past, however was unable to tolerate therapeutic dosing  He is currently taking duloxetine 30 mg daily and has noticed a 50% improvement of his pain without side effects  He also continues methocarbamol p r n , meloxicam 15 mg p r n  And tramadol p r n  With transient relief  The patient rates his pain a 7/10 on the numeric pain rating scale  States the pain is constant nature and bothersome the morning  He characterizes the pain as cramping, shooting and numbness  I have personally reviewed and/or updated the patient's past medical history, past surgical history, family history, social history, current medications, allergies, and vital signs today  Review of Systems:    Review of Systems   Respiratory: Negative for shortness of breath  Cardiovascular: Negative for chest pain  Gastrointestinal: Negative for constipation, diarrhea, nausea and vomiting  Musculoskeletal: Positive for gait problem  Negative for arthralgias, joint swelling and myalgias  Skin: Negative for rash  Neurological: Positive for weakness  Negative for dizziness and seizures  All other systems reviewed and are negative          Past Medical History:   Diagnosis Date    Chronic back pain     Fibromyalgia  Hypertension     RA (rheumatoid arthritis) ([facility] [address] )        Past Surgical History:   Procedure Laterality Date    CARPAL TUNNEL RELEASE Left     LATERAL EPICONDYLE RELEASE Right 2/25/2016    Procedure: RELEASE EPICONDYLAR ELBOW, LATERAL;  Surgeon: Aden Mcintyre MD;  [facility];  Service:     [place] REPAIR ACHILLES TENDON,PRIMARY Left 4/2/2018    Procedure: REPAIR TENDON ACHILLES;  Surgeon: Sarah Wills MD;  [facility];  Service: Orthopedics    [place] TENOTOMY ELBOW LATERAL/MEDIAL DEBRIDE OPEN Left 2/12/2019    Procedure: RELEASE EPICONDYLAR ELBOW;  Surgeon: Aden Mcintyre MD;  [facility];  Service: Orthopedics    ROTATOR CUFF REPAIR Left     ULNAR TUNNEL RELEASE Left     x2       Family History   Problem Relation Age of Onset    Hypertension Mother     Diabetes Mother        Social History     Occupational History    Not on file   Tobacco Use    Smoking status: Never Smoker    Smokeless tobacco: Never Used   Substance and Sexual Activity    Alcohol use: No    Drug use: No    Sexual activity: Yes         Current Outpatient Medications:     amLODIPine (NORVASC) 10 mg tablet, Take 10 mg by mouth every evening  , Disp: , Rfl:     Butalbital-APAP-Caffeine (FIORICET PO), Take 1 tablet by mouth daily as needed Unknown dose  , Disp: , Rfl:     dexamethasone (DECADRON) 4 mg/mL, 1 mL (4 mg total) by Iontophoresis route 3 (three) times a week, Disp: 30 mL, Rfl: 1    levothyroxine 25 mcg tablet, , Disp: , Rfl: 0    Losartan Potassium (COZAAR PO), Take 100 mg by mouth daily  , Disp: , Rfl:     meloxicam (MOBIC) 15 mg tablet, Take 15 mg by mouth, Disp: , Rfl:     metoprolol succinate (TOPROL-XL) 50 mg 24 hr tablet, "I take one a day bedtime", Disp: , Rfl:     oxybutynin (DITROPAN-XL) 5 mg 24 hr tablet, Take 5 mg by mouth daily, Disp: , Rfl:     pantoprazole (PROTONIX) 40 mg tablet, Take 40 mg by mouth daily, Disp: , Rfl:     traMADol (ULTRAM) 50 mg tablet, Take 50 mg by mouth every 6 (six) hours as needed for moderate pain, Disp: , Rfl:     DULoxetine (CYMBALTA) 60 mg delayed release capsule, Take 1 capsule (60 mg total) by mouth daily, Disp: 30 capsule, Rfl: 1    Allergies   Allergen Reactions    Lisinopril      Other reaction(s): Other (See Comments)  Cough and rash         Reglan [Metoclopramide] Other (See Comments)     Restless; akisthesia         Physical Exam:    BP (!) 169/113   Pulse 60   Temp 98 2 °F (36 8 °C)   Ht 5' 7" (1 702 m)   Wt 85 7 kg (189 lb)   BMI 29 60 kg/m²     Constitutional:normal, well developed, well nourished, alert, in no distress and non-toxic and no overt pain behavior  Eyes:anicteric  HEENT:grossly intact  Neck:supple, symmetric, trachea midline and no masses   Pulmonary:even and unlabored  Cardiovascular:No edema or pitting edema present  Skin:Normal without rashes or lesions and well hydrated  Psychiatric:Mood and affect appropriate  Neurologic:Cranial Nerves II-XII grossly intact  Musculoskeletal:antalgic gait but steady without the use of assistive devices      Imaging  FL spine and pain procedure    (Results Pending)     MRI CERVICAL SPINE WITHOUT CONTRAST     INDICATION: M54 12: Radiculopathy, cervical region      COMPARISON: MRI performed on 2/6/2013     TECHNIQUE:  Sagittal T1, sagittal T2, sagittal inversion recovery, axial T2, axial  2D merge  Imaging performed on 3 0T MRI    IMAGE QUALITY:  Diagnostic     FINDINGS:     ALIGNMENT:  Normal alignment of the cervical spine  No compression fracture  No subluxation  No scoliosis      MARROW SIGNAL:  Normal marrow signal is identified within the visualized bony structures    No discrete marrow lesion      CERVICAL AND VISUALIZED THORACIC CORD:  Normal signal within the visualized cord      PREVERTEBRAL AND PARASPINAL SOFT TISSUES:  Normal      VISUALIZED POSTERIOR FOSSA:  The visualized posterior fossa demonstrates no abnormal signal      CERVICAL DISC SPACES:     C2-C3:  No disc herniation, canal or foraminal stenosis      C3-C4:  Small disc osteophyte complex and uncovertebral hypertrophy  Mild canal, mild to moderate right and mild left foraminal stenosis is new       C4-C5:  Discogenic complex with facet and uncovertebral hypertrophy  New mild canal and moderate to severe left foraminal stenosis  Moderate right neuroforaminal stenosis is worse       C5-C6:  Small disc bulge that mildly indents the thecal sac  Uncovertebral hypertrophy  Mild right and moderate left foraminal stenosis  Findings are new      C6-C7:  No disc herniation, canal or foraminal stenosis      C7-T1:  No disc herniation, canal or right neuroforaminal stenosis  Mild left foraminal stenosis due to facet hypertrophy      UPPER THORACIC DISC SPACES:  Ligamentum flavum hypertrophy at T2-3 and T4-5 indents the posterior thecal sac with mild canal stenosis  Stable T2-3, not previously imaged at T4-5      IMPRESSION:     Degenerative spondylosis as described has progressed since 2013  MRI LUMBAR SPINE WITHOUT CONTRAST     INDICATION: M54 16: Radiculopathy, lumbar region      COMPARISON:  X-rays dated 2/21/2011     TECHNIQUE:  Sagittal T1, sagittal T2, sagittal inversion recovery, axial T1 and axial T2, coronal T2  Imaging performed on 3 0T MRI    IMAGE QUALITY:  Diagnostic     FINDINGS:     VERTEBRAL BODIES:  There are 5 lumbar type vertebral bodies  Normal alignment of the lumbar spine  No spondylolysis or spondylolisthesis  No scoliosis  No compression fracture  Normal marrow signal is identified within the visualized bony   structures  No discrete marrow lesion      SACRUM:  Normal signal within the sacrum   No evidence of insufficiency or stress fracture      DISTAL CORD AND CONUS:  Normal size and signal within the distal cord and conus          PARASPINAL SOFT TISSUES:  Paraspinal soft tissues are unremarkable      LOWER THORACIC DISC SPACES:  Normal disc height and signal   No disc herniation, canal stenosis or foraminal narrowing      LUMBAR DISC SPACES:     L1-L2:  Normal      L2-L3:  Normal      L3-L4: Disc bulge and facet hypertrophy  Mild canal and mild neuroforaminal stenosis, right worse than left       L4-L5: Small disc bulge and facet arthropathy  Mild canal stenosis  Moderate foraminal stenosis, right worse than left       L5-S1:  Small disc bulge and mild facet arthropathy   No canal stenosis and mild foraminal stenosis      IMPRESSION:     Degenerative spondylosis from L3-4 to L5-S1      Workstation performed: [id number]    Orders Placed This Encounter   Procedures    [place] spine and pain procedure

## 2020-09-23 ENCOUNTER — TELEPHONE (OUTPATIENT)
Dept: PAIN MEDICINE | Facility: CLINIC | Age: 50
End: 2020-09-23

## 2020-09-30 ENCOUNTER — HOSPITAL ENCOUNTER (OUTPATIENT)
Dept: RADIOLOGY | Facility: CLINIC | Age: 50
Discharge: HOME | End: 2020-09-30
Attending: ANESTHESIOLOGY | Admitting: ANESTHESIOLOGY
Payer: COMMERCIAL

## 2020-09-30 VITALS
TEMPERATURE: 97.8 F | SYSTOLIC BLOOD PRESSURE: 160 MMHG | DIASTOLIC BLOOD PRESSURE: 90 MMHG | RESPIRATION RATE: 20 BRPM | HEART RATE: 65 BPM | OXYGEN SATURATION: 98 %

## 2020-09-30 DIAGNOSIS — M54.12 CERVICAL RADICULOPATHY: ICD-10-CM

## 2020-09-30 PROCEDURE — 62321 NJX INTERLAMINAR CRV/THRC: CPT | Performed by: ANESTHESIOLOGY

## 2020-09-30 RX ORDER — LIDOCAINE HYDROCHLORIDE 10 MG/ML
5 INJECTION, SOLUTION EPIDURAL; INFILTRATION; INTRACAUDAL; PERINEURAL ONCE
Status: COMPLETED | OUTPATIENT
Start: 2020-09-30 | End: 2020-09-30

## 2020-09-30 RX ORDER — PAPAVERINE HCL 150 MG
10 CAPSULE, EXTENDED RELEASE ORAL ONCE
Status: COMPLETED | OUTPATIENT
Start: 2020-09-30 | End: 2020-09-30

## 2020-09-30 RX ADMIN — IOHEXOL 0.5 ML: 300 INJECTION, SOLUTION INTRAVENOUS at 08:46

## 2020-09-30 RX ADMIN — LIDOCAINE HYDROCHLORIDE 3 ML: 10 INJECTION, SOLUTION EPIDURAL; INFILTRATION; INTRACAUDAL; PERINEURAL at 08:45

## 2020-09-30 RX ADMIN — DEXAMETHASONE SODIUM PHOSPHATE 10 MG: 10 INJECTION, SOLUTION INTRAMUSCULAR; INTRAVENOUS at 08:47

## 2020-10-06 ENCOUNTER — TELEPHONE (OUTPATIENT)
Dept: OBGYN CLINIC | Facility: HOSPITAL | Age: 50
End: 2020-10-06

## 2020-10-07 ENCOUNTER — TELEPHONE (OUTPATIENT)
Dept: PAIN MEDICINE | Facility: CLINIC | Age: 50
End: 2020-10-07

## 2020-10-08 ENCOUNTER — TELEPHONE (OUTPATIENT)
Dept: OBGYN CLINIC | Facility: MEDICAL CENTER | Age: 50
End: 2020-10-08

## 2020-10-28 ENCOUNTER — TELEPHONE (OUTPATIENT)
Dept: PAIN MEDICINE | Facility: CLINIC | Age: 50
End: 2020-10-28

## 2020-10-28 ENCOUNTER — OFFICE VISIT (OUTPATIENT)
Dept: PAIN MEDICINE | Facility: CLINIC | Age: 50
End: 2020-10-28
Payer: COMMERCIAL

## 2020-10-28 VITALS
BODY MASS INDEX: 29.03 KG/M2 | HEART RATE: 74 BPM | TEMPERATURE: 97.9 F | WEIGHT: 185 LBS | HEIGHT: 67 IN | SYSTOLIC BLOOD PRESSURE: 158 MMHG | DIASTOLIC BLOOD PRESSURE: 99 MMHG

## 2020-10-28 DIAGNOSIS — M54.12 CERVICAL RADICULOPATHY: ICD-10-CM

## 2020-10-28 DIAGNOSIS — M54.16 RADICULOPATHY, LUMBAR REGION: ICD-10-CM

## 2020-10-28 DIAGNOSIS — M47.812 CERVICAL SPONDYLOSIS: Primary | ICD-10-CM

## 2020-10-28 DIAGNOSIS — F41.9 ANXIETY: Primary | ICD-10-CM

## 2020-10-28 PROCEDURE — 99214 OFFICE O/P EST MOD 30 MIN: CPT | Performed by: NURSE PRACTITIONER

## 2020-10-28 RX ORDER — DIAZEPAM 5 MG/1
TABLET ORAL
Qty: 1 TABLET | Refills: 0 | Status: SHIPPED | OUTPATIENT
Start: 2020-10-28 | End: 2020-12-18

## 2020-10-28 RX ORDER — DULOXETIN HYDROCHLORIDE 60 MG/1
60 CAPSULE, DELAYED RELEASE ORAL DAILY
Qty: 30 CAPSULE | Refills: 2 | Status: SHIPPED | OUTPATIENT
Start: 2020-10-28 | End: 2020-12-18 | Stop reason: SDUPTHER

## 2020-10-28 RX ORDER — LOSARTAN POTASSIUM 100 MG/1
100 TABLET ORAL DAILY
COMMUNITY
Start: 2020-09-28

## 2020-10-31 ENCOUNTER — OFFICE VISIT (OUTPATIENT)
Dept: URGENT CARE | Age: 50
End: 2020-10-31
Payer: COMMERCIAL

## 2020-10-31 VITALS — RESPIRATION RATE: 18 BRPM | OXYGEN SATURATION: 98 % | TEMPERATURE: 97.1 F | HEART RATE: 76 BPM

## 2020-10-31 DIAGNOSIS — Z20.822 COVID-19 RULED OUT: Primary | ICD-10-CM

## 2020-10-31 PROCEDURE — 99213 OFFICE O/P EST LOW 20 MIN: CPT | Performed by: PREVENTIVE MEDICINE

## 2020-10-31 PROCEDURE — U0003 INFECTIOUS AGENT DETECTION BY NUCLEIC ACID (DNA OR RNA); SEVERE ACUTE RESPIRATORY SYNDROME CORONAVIRUS 2 (SARS-COV-2) (CORONAVIRUS DISEASE [COVID-19]), AMPLIFIED PROBE TECHNIQUE, MAKING USE OF HIGH THROUGHPUT TECHNOLOGIES AS DESCRIBED BY CMS-2020-01-R: HCPCS | Performed by: PREVENTIVE MEDICINE

## 2020-11-02 ENCOUNTER — TELEPHONE (OUTPATIENT)
Dept: PAIN MEDICINE | Facility: CLINIC | Age: 50
End: 2020-11-02

## 2020-11-02 LAB — SARS-COV-2 RNA SPEC QL NAA+PROBE: NOT DETECTED

## 2020-11-03 ENCOUNTER — TELEPHONE (OUTPATIENT)
Dept: OBGYN CLINIC | Facility: CLINIC | Age: 50
End: 2020-11-03

## 2020-11-04 ENCOUNTER — TELEPHONE (OUTPATIENT)
Dept: URGENT CARE | Facility: CLINIC | Age: 50
End: 2020-11-04

## 2020-11-13 ENCOUNTER — TELEPHONE (OUTPATIENT)
Dept: PAIN MEDICINE | Facility: MEDICAL CENTER | Age: 50
End: 2020-11-13

## 2020-11-16 ENCOUNTER — TELEPHONE (OUTPATIENT)
Dept: PAIN MEDICINE | Facility: CLINIC | Age: 50
End: 2020-11-16

## 2020-11-16 DIAGNOSIS — M54.2 NECK PAIN: Primary | ICD-10-CM

## 2020-11-16 DIAGNOSIS — M54.16 LUMBAR RADICULOPATHY: ICD-10-CM

## 2020-12-09 ENCOUNTER — OFFICE VISIT (OUTPATIENT)
Dept: OBGYN CLINIC | Facility: OTHER | Age: 50
End: 2020-12-09
Payer: COMMERCIAL

## 2020-12-09 VITALS
HEART RATE: 66 BPM | HEIGHT: 67 IN | BODY MASS INDEX: 30.61 KG/M2 | SYSTOLIC BLOOD PRESSURE: 176 MMHG | WEIGHT: 195 LBS | DIASTOLIC BLOOD PRESSURE: 110 MMHG

## 2020-12-09 DIAGNOSIS — M22.2X1 PATELLOFEMORAL DISORDER OF RIGHT KNEE: Primary | ICD-10-CM

## 2020-12-09 PROCEDURE — 20610 DRAIN/INJ JOINT/BURSA W/O US: CPT | Performed by: ORTHOPAEDIC SURGERY

## 2020-12-09 RX ORDER — TAMSULOSIN HYDROCHLORIDE 0.4 MG/1
0.4 CAPSULE ORAL
COMMUNITY
Start: 2020-01-20 | End: 2022-02-07 | Stop reason: ALTCHOICE

## 2020-12-09 RX ORDER — HYALURONATE SODIUM 10 MG/ML
20 SYRINGE (ML) INTRAARTICULAR
Status: COMPLETED | OUTPATIENT
Start: 2020-12-09 | End: 2020-12-09

## 2020-12-09 RX ORDER — OXYBUTYNIN CHLORIDE 5 MG/1
5 TABLET, EXTENDED RELEASE ORAL DAILY
COMMUNITY
Start: 2020-11-09 | End: 2022-03-31 | Stop reason: SDUPTHER

## 2020-12-09 RX ADMIN — Medication 20 MG: at 12:11

## 2020-12-16 ENCOUNTER — OFFICE VISIT (OUTPATIENT)
Dept: OBGYN CLINIC | Facility: OTHER | Age: 50
End: 2020-12-16
Payer: COMMERCIAL

## 2020-12-16 VITALS — WEIGHT: 195 LBS | BODY MASS INDEX: 30.54 KG/M2

## 2020-12-16 DIAGNOSIS — M22.2X1 PATELLOFEMORAL DISORDER OF RIGHT KNEE: Primary | ICD-10-CM

## 2020-12-16 DIAGNOSIS — G89.29 CHRONIC PAIN OF LEFT KNEE: ICD-10-CM

## 2020-12-16 DIAGNOSIS — M25.562 CHRONIC PAIN OF LEFT KNEE: ICD-10-CM

## 2020-12-16 PROCEDURE — 20610 DRAIN/INJ JOINT/BURSA W/O US: CPT | Performed by: PHYSICIAN ASSISTANT

## 2020-12-16 PROCEDURE — 99213 OFFICE O/P EST LOW 20 MIN: CPT | Performed by: PHYSICIAN ASSISTANT

## 2020-12-16 RX ORDER — METHYLPREDNISOLONE ACETATE 40 MG/ML
1 INJECTION, SUSPENSION INTRA-ARTICULAR; INTRALESIONAL; INTRAMUSCULAR; SOFT TISSUE
Status: COMPLETED | OUTPATIENT
Start: 2020-12-16 | End: 2020-12-16

## 2020-12-16 RX ORDER — HYALURONATE SODIUM 10 MG/ML
20 SYRINGE (ML) INTRAARTICULAR
Status: COMPLETED | OUTPATIENT
Start: 2020-12-16 | End: 2020-12-16

## 2020-12-16 RX ORDER — BUPIVACAINE HYDROCHLORIDE 2.5 MG/ML
4 INJECTION, SOLUTION INFILTRATION; PERINEURAL
Status: COMPLETED | OUTPATIENT
Start: 2020-12-16 | End: 2020-12-16

## 2020-12-16 RX ADMIN — BUPIVACAINE HYDROCHLORIDE 4 ML: 2.5 INJECTION, SOLUTION INFILTRATION; PERINEURAL at 10:29

## 2020-12-16 RX ADMIN — Medication 20 MG: at 10:29

## 2020-12-16 RX ADMIN — METHYLPREDNISOLONE ACETATE 1 ML: 40 INJECTION, SUSPENSION INTRA-ARTICULAR; INTRALESIONAL; INTRAMUSCULAR; SOFT TISSUE at 10:29

## 2020-12-18 ENCOUNTER — OFFICE VISIT (OUTPATIENT)
Dept: PAIN MEDICINE | Facility: CLINIC | Age: 50
End: 2020-12-18
Payer: COMMERCIAL

## 2020-12-18 VITALS
HEIGHT: 67 IN | TEMPERATURE: 98.5 F | DIASTOLIC BLOOD PRESSURE: 106 MMHG | SYSTOLIC BLOOD PRESSURE: 169 MMHG | WEIGHT: 195 LBS | BODY MASS INDEX: 30.61 KG/M2

## 2020-12-18 DIAGNOSIS — M47.812 SPONDYLOSIS OF CERVICAL SPINE WITHOUT MYELOPATHY: ICD-10-CM

## 2020-12-18 DIAGNOSIS — M25.562 CHRONIC PAIN OF LEFT KNEE: ICD-10-CM

## 2020-12-18 DIAGNOSIS — M54.12 CERVICAL RADICULOPATHY: ICD-10-CM

## 2020-12-18 DIAGNOSIS — M54.2 NECK PAIN: ICD-10-CM

## 2020-12-18 DIAGNOSIS — G89.4 CHRONIC PAIN SYNDROME: Primary | ICD-10-CM

## 2020-12-18 DIAGNOSIS — M51.36 DDD (DEGENERATIVE DISC DISEASE), LUMBAR: ICD-10-CM

## 2020-12-18 DIAGNOSIS — M47.816 LUMBAR SPONDYLOSIS: ICD-10-CM

## 2020-12-18 DIAGNOSIS — G89.29 CHRONIC PAIN OF LEFT KNEE: ICD-10-CM

## 2020-12-18 DIAGNOSIS — M75.41 IMPINGEMENT SYNDROME OF RIGHT SHOULDER: ICD-10-CM

## 2020-12-18 DIAGNOSIS — M54.16 RADICULOPATHY, LUMBAR REGION: ICD-10-CM

## 2020-12-18 PROCEDURE — 99214 OFFICE O/P EST MOD 30 MIN: CPT | Performed by: NURSE PRACTITIONER

## 2020-12-18 RX ORDER — TRIAMCINOLONE ACETONIDE 1 MG/G
CREAM TOPICAL AS NEEDED
COMMUNITY
Start: 2020-12-11

## 2020-12-18 RX ORDER — DULOXETIN HYDROCHLORIDE 60 MG/1
60 CAPSULE, DELAYED RELEASE ORAL DAILY
Qty: 30 CAPSULE | Refills: 2 | Status: SHIPPED | OUTPATIENT
Start: 2020-12-18 | End: 2021-05-10 | Stop reason: SDUPTHER

## 2020-12-18 RX ORDER — DULOXETIN HYDROCHLORIDE 30 MG/1
30 CAPSULE, DELAYED RELEASE ORAL DAILY
Qty: 30 CAPSULE | Refills: 1 | Status: SHIPPED | OUTPATIENT
Start: 2020-12-18 | End: 2021-05-10 | Stop reason: SDUPTHER

## 2020-12-23 ENCOUNTER — OFFICE VISIT (OUTPATIENT)
Dept: OBGYN CLINIC | Facility: OTHER | Age: 50
End: 2020-12-23
Payer: COMMERCIAL

## 2020-12-23 VITALS
HEART RATE: 69 BPM | BODY MASS INDEX: 30.54 KG/M2 | DIASTOLIC BLOOD PRESSURE: 122 MMHG | WEIGHT: 195 LBS | SYSTOLIC BLOOD PRESSURE: 173 MMHG

## 2020-12-23 DIAGNOSIS — M22.2X2 PATELLOFEMORAL DISORDER OF LEFT KNEE: Primary | ICD-10-CM

## 2020-12-23 PROCEDURE — 99213 OFFICE O/P EST LOW 20 MIN: CPT | Performed by: ORTHOPAEDIC SURGERY

## 2020-12-29 ENCOUNTER — TELEPHONE (OUTPATIENT)
Dept: PAIN MEDICINE | Facility: CLINIC | Age: 50
End: 2020-12-29

## 2021-02-11 DIAGNOSIS — M47.816 LUMBAR SPONDYLOSIS: ICD-10-CM

## 2021-02-11 DIAGNOSIS — M51.36 DDD (DEGENERATIVE DISC DISEASE), LUMBAR: ICD-10-CM

## 2021-02-11 DIAGNOSIS — M54.12 CERVICAL RADICULOPATHY: ICD-10-CM

## 2021-02-11 DIAGNOSIS — M47.812 SPONDYLOSIS OF CERVICAL SPINE WITHOUT MYELOPATHY: ICD-10-CM

## 2021-02-11 DIAGNOSIS — M54.2 NECK PAIN: ICD-10-CM

## 2021-02-11 RX ORDER — DULOXETIN HYDROCHLORIDE 30 MG/1
CAPSULE, DELAYED RELEASE ORAL
Qty: 30 CAPSULE | Refills: 1 | OUTPATIENT
Start: 2021-02-11

## 2021-03-06 ENCOUNTER — HOSPITAL ENCOUNTER (EMERGENCY)
Facility: HOSPITAL | Age: 51
Discharge: HOME | End: 2021-03-06
Attending: EMERGENCY MEDICINE
Payer: COMMERCIAL

## 2021-03-06 ENCOUNTER — APPOINTMENT (EMERGENCY)
Dept: RADIOLOGY | Facility: HOSPITAL | Age: 51
End: 2021-03-06
Payer: COMMERCIAL

## 2021-03-06 VITALS
DIASTOLIC BLOOD PRESSURE: 100 MMHG | RESPIRATION RATE: 18 BRPM | OXYGEN SATURATION: 96 % | BODY MASS INDEX: 29.03 KG/M2 | HEART RATE: 67 BPM | TEMPERATURE: 97.6 F | HEIGHT: 67 IN | WEIGHT: 185 LBS | SYSTOLIC BLOOD PRESSURE: 190 MMHG

## 2021-03-06 DIAGNOSIS — N20.1 URETEROLITHIASIS: Primary | ICD-10-CM

## 2021-03-06 DIAGNOSIS — R10.9 RIGHT FLANK PAIN: ICD-10-CM

## 2021-03-06 LAB
ANION GAP SERPL CALCULATED.3IONS-SCNC: 6 MMOL/L (ref 4–13)
BACTERIA UR QL AUTO: ABNORMAL /HPF
BASOPHILS # BLD AUTO: 0.09 THOUSANDS/ΜL (ref 0–0.1)
BASOPHILS NFR BLD AUTO: 1 % (ref 0–1)
BILIRUB UR QL STRIP: NEGATIVE
BUN SERPL-MCNC: 17 MG/DL (ref 5–25)
CALCIUM SERPL-MCNC: 9.5 MG/DL (ref 8.3–10.1)
CHLORIDE SERPL-SCNC: 108 MMOL/L (ref 100–108)
CLARITY UR: CLEAR
CO2 SERPL-SCNC: 28 MMOL/L (ref 21–32)
COLOR UR: YELLOW
CREAT SERPL-MCNC: 1.16 MG/DL (ref 0.6–1.3)
EOSINOPHIL # BLD AUTO: 0.29 THOUSAND/ΜL (ref 0–0.61)
EOSINOPHIL NFR BLD AUTO: 3 % (ref 0–6)
ERYTHROCYTE [DISTWIDTH] IN BLOOD BY AUTOMATED COUNT: 13.2 % (ref 11.6–15.1)
FINE GRAN CASTS URNS QL MICRO: ABNORMAL /LPF
GFR SERPL CREATININE-BSD FRML MDRD: 74 ML/MIN/1.73SQ M
GLUCOSE SERPL-MCNC: 121 MG/DL (ref 65–140)
GLUCOSE UR STRIP-MCNC: NEGATIVE MG/DL
HCT VFR BLD AUTO: 48.8 % (ref 36.5–49.3)
HGB BLD-MCNC: 16.3 G/DL (ref 12–17)
HGB UR QL STRIP.AUTO: ABNORMAL
HYALINE CASTS #/AREA URNS LPF: ABNORMAL /LPF
IMM GRANULOCYTES # BLD AUTO: 0.04 THOUSAND/UL (ref 0–0.2)
IMM GRANULOCYTES NFR BLD AUTO: 0 % (ref 0–2)
KETONES UR STRIP-MCNC: NEGATIVE MG/DL
LEUKOCYTE ESTERASE UR QL STRIP: NEGATIVE
LYMPHOCYTES # BLD AUTO: 3.55 THOUSANDS/ΜL (ref 0.6–4.47)
LYMPHOCYTES NFR BLD AUTO: 36 % (ref 14–44)
MCH RBC QN AUTO: 31.1 PG (ref 26.8–34.3)
MCHC RBC AUTO-ENTMCNC: 33.4 G/DL (ref 31.4–37.4)
MCV RBC AUTO: 93 FL (ref 82–98)
MONOCYTES # BLD AUTO: 0.79 THOUSAND/ΜL (ref 0.17–1.22)
MONOCYTES NFR BLD AUTO: 8 % (ref 4–12)
NEUTROPHILS # BLD AUTO: 5.23 THOUSANDS/ΜL (ref 1.85–7.62)
NEUTS SEG NFR BLD AUTO: 52 % (ref 43–75)
NITRITE UR QL STRIP: NEGATIVE
NON-SQ EPI CELLS URNS QL MICRO: ABNORMAL /HPF
NRBC BLD AUTO-RTO: 0 /100 WBCS
PH UR STRIP.AUTO: 6.5 [PH] (ref 4.5–8)
PLATELET # BLD AUTO: 304 THOUSANDS/UL (ref 149–390)
PMV BLD AUTO: 9.9 FL (ref 8.9–12.7)
POTASSIUM SERPL-SCNC: 3.5 MMOL/L (ref 3.5–5.3)
PROT UR STRIP-MCNC: NEGATIVE MG/DL
RBC # BLD AUTO: 5.24 MILLION/UL (ref 3.88–5.62)
RBC #/AREA URNS AUTO: ABNORMAL /HPF
SODIUM SERPL-SCNC: 142 MMOL/L (ref 136–145)
SP GR UR STRIP.AUTO: 1.02 (ref 1–1.03)
UROBILINOGEN UR QL STRIP.AUTO: 0.2 E.U./DL
WBC # BLD AUTO: 9.99 THOUSAND/UL (ref 4.31–10.16)
WBC #/AREA URNS AUTO: ABNORMAL /HPF

## 2021-03-06 PROCEDURE — G1004 CDSM NDSC: HCPCS

## 2021-03-06 PROCEDURE — 74176 CT ABD & PELVIS W/O CONTRAST: CPT

## 2021-03-06 PROCEDURE — 85025 COMPLETE CBC W/AUTO DIFF WBC: CPT | Performed by: EMERGENCY MEDICINE

## 2021-03-06 PROCEDURE — 96375 TX/PRO/DX INJ NEW DRUG ADDON: CPT

## 2021-03-06 PROCEDURE — 96374 THER/PROPH/DIAG INJ IV PUSH: CPT

## 2021-03-06 PROCEDURE — 80048 BASIC METABOLIC PNL TOTAL CA: CPT | Performed by: EMERGENCY MEDICINE

## 2021-03-06 PROCEDURE — 99285 EMERGENCY DEPT VISIT HI MDM: CPT | Performed by: EMERGENCY MEDICINE

## 2021-03-06 PROCEDURE — 99284 EMERGENCY DEPT VISIT MOD MDM: CPT

## 2021-03-06 PROCEDURE — 36415 COLL VENOUS BLD VENIPUNCTURE: CPT | Performed by: EMERGENCY MEDICINE

## 2021-03-06 PROCEDURE — 96361 HYDRATE IV INFUSION ADD-ON: CPT

## 2021-03-06 PROCEDURE — 81001 URINALYSIS AUTO W/SCOPE: CPT

## 2021-03-06 RX ORDER — KETOROLAC TROMETHAMINE 30 MG/ML
15 INJECTION, SOLUTION INTRAMUSCULAR; INTRAVENOUS ONCE
Status: COMPLETED | OUTPATIENT
Start: 2021-03-06 | End: 2021-03-06

## 2021-03-06 RX ORDER — HYDROMORPHONE HCL/PF 1 MG/ML
1 SYRINGE (ML) INJECTION ONCE
Status: COMPLETED | OUTPATIENT
Start: 2021-03-06 | End: 2021-03-06

## 2021-03-06 RX ADMIN — KETOROLAC TROMETHAMINE 15 MG: 30 INJECTION, SOLUTION INTRAMUSCULAR at 02:14

## 2021-03-06 RX ADMIN — HYDROMORPHONE HYDROCHLORIDE 1 MG: 1 INJECTION, SOLUTION INTRAMUSCULAR; INTRAVENOUS; SUBCUTANEOUS at 02:58

## 2021-03-06 RX ADMIN — SODIUM CHLORIDE 500 ML: 0.9 INJECTION, SOLUTION INTRAVENOUS at 03:00

## 2021-03-06 NOTE — DISCHARGE INSTRUCTIONS
- Ibuprofen 400 mg (2 standard tablets) every 8 hrs  - Tramadol as needed for breakthrough pain is ok  - Follow-up with urology

## 2021-03-06 NOTE — ED PROVIDER NOTES
History  Chief Complaint   Patient presents with    Flank Pain     pt c/o of right sided flank pain since 1930 last evening  HPI   60-year-old gentleman here with right flank pain  Pain began around 730 pm yesterday evening  Patient has pain that radiates from the right flank to the right groin  It comes in waves  Patient moving around and having trouble getting himself feeling comfortable  He says when he tries to urinate he feels like only a few drops come out  He does have a history of hyperactive bladder and takes oxybutynin  He has not noticed any gross hematuria  No dysuria  No fever, nausea, vomiting, or abdominal pain  Patient has no history of nephrolithiasis  Prior to Admission Medications   Prescriptions Last Dose Informant Patient Reported? Taking?    Butalbital-APAP-Caffeine (FIORICET PO)  Self Yes No   Sig: Take 1 tablet by mouth daily as needed Unknown dose     DULoxetine (CYMBALTA) 30 mg delayed release capsule   No No   Sig: Take 1 capsule (30 mg total) by mouth daily   DULoxetine (CYMBALTA) 60 mg delayed release capsule   No No   Sig: Take 1 capsule (60 mg total) by mouth daily   Losartan Potassium (COZAAR PO)  Self Yes No   Sig: Take 100 mg by mouth daily     amLODIPine (NORVASC) 10 mg tablet  Self Yes No   Sig: Take 10 mg by mouth every evening     dexamethasone (DECADRON) 4 mg/mL   No No   Si mL (4 mg total) by Iontophoresis route 3 (three) times a week   levothyroxine 25 mcg tablet   Yes No   losartan (COZAAR) 100 MG tablet   Yes No   Sig: Take 100 mg by mouth daily   meloxicam (MOBIC) 15 mg tablet   Yes No   Sig: Take 15 mg by mouth   metoprolol succinate (TOPROL-XL) 50 mg 24 hr tablet  Self Yes No   Sig: "I take one a day bedtime"   oxybutynin (DITROPAN-XL) 5 mg 24 hr tablet   Yes No   Sig: Take 5 mg by mouth daily   oxybutynin (DITROPAN-XL) 5 mg 24 hr tablet   Yes No   Sig: Take 5 mg by mouth daily   pantoprazole (PROTONIX) 40 mg tablet   Yes No   Sig: Take 40 mg by mouth daily   tamsulosin (FLOMAX) 0 4 mg   Yes No   Sig: Take 0 4 mg by mouth   traMADol (ULTRAM) 50 mg tablet   Yes No   Sig: Take 50 mg by mouth every 6 (six) hours as needed for moderate pain   triamcinolone (KENALOG) 0 1 % cream   Yes No      Facility-Administered Medications: None       Past Medical History:   Diagnosis Date    Chronic back pain     Fibromyalgia     Hypertension     RA [facility]rheumatoid arthritis) ([facility] [address] )        Past Surgical History:   Procedure Laterality Date    CARPAL TUNNEL RELEASE Left     LATERAL EPICONDYLE RELEASE Right 2/25/2016    Procedure: RELEASE EPICONDYLAR ELBOW, LATERAL;  Surgeon: Therese Thorpe MD;  [facility];  Service:     MT REPAIR ACHILLES TENDON,PRIMARY Left 4/2/2018    Procedure: REPAIR TENDON ACHILLES;  Surgeon: Tristan Hernandez MD;  [facility];  Service: Orthopedics    MT TENOTOMY ELBOW LATERAL/MEDIAL DEBRIDE OPEN Left 2/12/2019    Procedure: RELEASE EPICONDYLAR ELBOW;  Surgeon: Therese Thorpe MD;  [facility];  Service: Orthopedics    ROTATOR CUFF REPAIR Left     ULNAR TUNNEL RELEASE Left     x2       Family History   Problem Relation Age of Onset    Hypertension Mother     Diabetes Mother      I have reviewed and agree with the history as documented  E-Cigarette/Vaping     E-Cigarette/Vaping Substances     Social History     Tobacco Use    Smoking status: Never Smoker    Smokeless tobacco: Never Used   Substance Use Topics    Alcohol use: No    Drug use: No        Review of Systems   Constitutional: Negative for chills and fever  Respiratory: Negative for shortness of breath  Cardiovascular: Negative for chest pain  Gastrointestinal: Negative for abdominal pain, nausea and vomiting  Genitourinary: Positive for difficulty urinating and flank pain  Negative for discharge, dysuria, hematuria and testicular pain  Skin: Negative for color change and rash  All other systems reviewed and are negative        Physical Exam  ED Triage Vitals [03/06/21 0142]   Temperature Pulse Respirations Blood Pressure SpO2   97 6 °F (36 4 °C) 67 18 (!) 190/100 96 %      Temp Source Heart Rate Source Patient Position - Orthostatic VS BP Location FiO2 (%)   Oral Monitor Lying Right arm --      Pain Score       Worst Possible Pain             Orthostatic Vital Signs  Vitals:    03/06/21 0142   BP: (!) 190/100   Pulse: 67   Patient Position - Orthostatic VS: Lying       Physical Exam  Vitals signs and nursing note reviewed  Constitutional:       General: He is not in acute distress  Appearance: He is well-developed  He is not diaphoretic  Comments: Appears uncomfortable  Writhing around on stretcher  HENT:      Head: Normocephalic and atraumatic  Eyes:      General: No scleral icterus  Conjunctiva/sclera: Conjunctivae normal       Pupils: Pupils are equal, round, and reactive to light  Neck:      Musculoskeletal: Normal range of motion and neck supple  Cardiovascular:      Rate and Rhythm: Normal rate and regular rhythm  Heart sounds: No murmur  No friction rub  No gallop  Pulmonary:      Breath sounds: Normal breath sounds  No wheezing or rales  Abdominal:      General: There is no distension  Palpations: Abdomen is soft  Tenderness: There is no abdominal tenderness  There is no guarding or rebound  Musculoskeletal: Normal range of motion  General: No tenderness  Skin:     General: Skin is warm and dry  Coloration: Skin is not pale  Findings: No erythema  Neurological:      Mental Status: He is alert and oriented to person, place, and time  Cranial Nerves: No cranial nerve deficit  Sensory: No sensory deficit  Motor: No abnormal muscle tone     Psychiatric:         Behavior: Behavior normal          ED Medications  Medications   ketorolac (TORADOL) injection 15 mg (15 mg Intravenous Given 3/6/21 0214)   HYDROmorphone (DILAUDID) injection 1 mg (1 mg Intravenous Given 3/6/21 0258)   sodium chloride 0 9 % bolus 500 mL (0 mL Intravenous Stopped 3/6/21 0340)       Diagnostic Studies  Results Reviewed     Procedure Component Value Units Date/Time    Urine Microscopic [[id number]]  (Abnormal) Collected: 03/06/21 0215    Lab Status: Final result Specimen: Urine, Other Updated: 03/06/21 0254     RBC, UA 30-50 /hpf      WBC, UA None Seen /hpf      Epithelial Cells None Seen /hpf      Bacteria, UA None Seen /hpf      Hyaline Casts, UA 5-10 /lpf      Fine granular casts 0-3 /lpf     Basic metabolic panel [[id number]] Collected: 03/06/21 0213    Lab Status: Final result Specimen: Blood from Arm, Right Updated: 03/06/21 0246     Sodium 142 mmol/L      Potassium 3 5 mmol/L      Chloride 108 mmol/L      CO2 28 mmol/L      ANION GAP 6 mmol/L      BUN 17 mg/dL      Creatinine 1 16 mg/dL      Glucose 121 mg/dL      Calcium 9 5 mg/dL      eGFR 74 ml/min/1 73sq m     Narrative:      [place] guidelines for Chronic Kidney Disease (CKD):     Stage 1 with normal or high GFR (GFR > 90 mL/min/1 73 square meters)    Stage 2 Mild CKD (GFR = 60-89 mL/min/1 73 square meters)    Stage 3A Moderate CKD (GFR = 45-59 mL/min/1 73 square meters)    Stage 3B Moderate CKD (GFR = 30-44 mL/min/1 73 square meters)    Stage 4 Severe CKD (GFR = 15-29 mL/min/1 73 square meters)    Stage 5 End Stage CKD (GFR <15 mL/min/1 73 square meters)  Note: GFR calculation is accurate only with a steady state creatinine    CBC and differential [[id number]] Collected: 03/06/21 0213    Lab Status: Final result Specimen: Blood from Arm, Right Updated: 03/06/21 0227     WBC 9 99 Thousand/uL      RBC 5 24 Million/uL      Hemoglobin 16 3 g/dL      Hematocrit 48 8 %      MCV 93 fL      MCH 31 1 pg      MCHC 33 4 g/dL      RDW 13 2 %      MPV 9 9 fL      Platelets 423 Thousands/uL      nRBC 0 /100 WBCs      Neutrophils Relative 52 %      Immat GRANS % 0 %      Lymphocytes Relative 36 %      Monocytes Relative 8 % Eosinophils Relative 3 %      Basophils Relative 1 %      Neutrophils Absolute 5 23 Thousands/µL      Immature Grans Absolute 0 04 Thousand/uL      Lymphocytes Absolute 3 55 Thousands/µL      Monocytes Absolute 0 79 Thousand/µL      Eosinophils Absolute 0 29 Thousand/µL      Basophils Absolute 0 09 Thousands/µL     Urine Macroscopic, POC [[id number]]  (Abnormal) Collected: 03/06/21 0215    Lab Status: Final result Specimen: Urine Updated: 03/06/21 0217     Color, UA Yellow     Clarity, UA Clear     pH, UA 6 5     Leukocytes, UA Negative     Nitrite, UA Negative     Protein, UA Negative mg/dl      Glucose, UA Negative mg/dl      Ketones, UA Negative mg/dl      Urobilinogen, UA 0 2 E U /dl      Bilirubin, UA Negative     Blood, UA Moderate     Specific Gravity, UA 1 025    Narrative:      CLINITEK RESULT                 CT renal stone study abdomen pelvis wo contrast   Final Result by Avelino Vitale MD (03/06 0247)      3 mm calculus in the distal right ureter just prior to the ureterovesicular junction with associated mild right hydronephrosis  Workstation performed: [id number]               Procedures  Procedures      ED Course  ED Course as of Mar 06 0417   Sat Mar 06, 2021   0256 Nephrolithiasis   RBC, UA(!): 30-50   0257 No UTI   WBC, UA: None Seen   0257 Reviewed patient's CT stone study  He has a 3 mm calculus just proximal to the right ureterovesicular junction        0257 Roughly baseline but will give fluid bolus   eGFR: 74                                       MDM  Number of Diagnoses or Management Options  Right flank pain: new and requires workup  Ureterolithiasis: new and requires workup     Amount and/or Complexity of Data Reviewed  Clinical lab tests: ordered and reviewed  Tests in the radiology section of CPT®: ordered and reviewed  Tests in the medicine section of CPT®: ordered and reviewed  Decide to obtain previous medical records or to obtain history from someone other than the patient: yes  Review and summarize past medical records: yes  Independent visualization of images, tracings, or specimens: yes    Patient Progress  Patient progress: improved     28-year-old gentleman here with acute right flank pain radiating to groin  Patient has renal colic type symptoms  Suspect right-sided ureterolithiasis  Patient will have CT stone study to confirm diagnosis  Will check urinalysis for signs of UTI  Will check  renal function for evidence of acute kidney injury  Will treat patient's pain with ketorolac and IV opioids if necessary  3 mm right ureterovesicular junction nephrolith  Patient required single dose of IV hydromorphone but when reassessed has significant improvement in pain  Able to void without difficulty  No UTI or NATALIYA  Patient will be discharged with urology follow-up  He was provided with a urine strainer  Will discuss expulsive therapy with his urologist given he is on oxybutynin  Disposition  Final diagnoses:   Ureterolithiasis - Right   Right flank pain     Time reflects when diagnosis was documented in both MDM as applicable and the Disposition within this note     Time User Action Codes Description Comment    3/6/2021  3:46 AM Nicolas Shook Add [N20 0] Right nephrolithiasis     3/6/2021  3:46 AM Nicolas Shook Remove [N20 0] Right nephrolithiasis     3/6/2021  3:46 AM Nicolas Shook Add [N20 1] Ureterolithiasis     3/6/2021  3:46 AM Nicolas Shook Modify [N20 1] Ureterolithiasis Right    3/6/2021  3:46 AM Nicolas Shook Add [R10 9] Right flank pain       ED Disposition     ED Disposition Condition Date/Time Comment    Discharge Good Sat Mar 6, 2021  3:50 AM Sirena Liu discharge to home/self care              Follow-up Information     Follow up With Specialties Details Why [address]  Urology - Counts include 234 beds at the [facility]   If symptoms worsen [address], [place] [address] [phone]          Discharge Medication List as of 3/6/2021  3:50 AM      CONTINUE these medications which have NOT CHANGED    Details   amLODIPine (NORVASC) 10 mg tablet Take 10 mg by mouth every evening  , Historical Med      Butalbital-APAP-Caffeine (FIORICET PO) Take 1 tablet by mouth daily as needed Unknown dose  , Historical Med      dexamethasone (DECADRON) 4 mg/mL 1 mL (4 mg total) by Iontophoresis route 3 (three) times a week, Starting Wed 7/10/2019, Normal      !! DULoxetine (CYMBALTA) 30 mg delayed release capsule Take 1 capsule (30 mg total) by mouth daily, Starting Fri 12/18/2020, Normal      !! DULoxetine (CYMBALTA) 60 mg delayed release capsule Take 1 capsule (60 mg total) by mouth daily, Starting Fri 12/18/2020, Normal      levothyroxine 25 mcg tablet Starting Mon 12/10/2018, Historical Med      !! losartan (COZAAR) 100 MG tablet Take 100 mg by mouth daily, Starting Mon 9/28/2020, Historical Med      !! Losartan Potassium (COZAAR PO) Take 100 mg by mouth daily  , Historical Med      meloxicam (MOBIC) 15 mg tablet Take 15 mg by mouth, Starting Fri 12/14/2018, Historical Med      metoprolol succinate (TOPROL-XL) 50 mg 24 hr tablet "I take one a day bedtime", Historical Med      !! oxybutynin (DITROPAN-XL) 5 mg 24 hr tablet Take 5 mg by mouth daily, Starting Fri 5/1/2020, Historical Med      !! oxybutynin (DITROPAN-XL) 5 mg 24 hr tablet Take 5 mg by mouth daily, Starting Mon 11/9/2020, Historical Med      pantoprazole (PROTONIX) 40 mg tablet Take 40 mg by mouth daily, Starting Fri 7/24/2020, Until Sat 7/24/2021, Historical Med      tamsulosin (FLOMAX) 0 4 mg Take 0 4 mg by mouth, Starting Mon 1/20/2020, Until Tue 1/19/2021, Historical Med      traMADol (ULTRAM) 50 mg tablet Take 50 mg by mouth every 6 (six) hours as needed for moderate pain, Historical Med      triamcinolone (KENALOG) 0 1 % cream Starting Fri 12/11/2020, Historical Med       !! - Potential duplicate medications found  Please discuss with provider          No discharge procedures on file  PDMP Review       Value Time User    PDMP Reviewed  Yes 12/18/2020  9:43 AM Maria Del Carmen Solomon, [address]           ED Provider  Attending physically available and evaluated Mavis Sherman I managed the patient along with the ED Attending      Electronically Signed by         Roland Kincaid MD  03/06/21 7338

## 2021-03-06 NOTE — ED ATTENDING ATTESTATION
3/6/2021  ISarah DO, saw and evaluated the patient  I have discussed the patient with the resident/non-physician practitioner and agree with the resident's/non-physician practitioner's findings, Plan of Care, and MDM as documented in the resident's/non-physician practitioner's note, except where noted  All available labs and Radiology studies were reviewed  I was present for key portions of any procedure(s) performed by the resident/non-physician practitioner and I was immediately available to provide assistance  At this point I agree with the current assessment done in the Emergency Department  I have conducted an independent evaluation of this patient a history and physical is as follows:    71-year-old male presents with right flank pain  Patient states that started at 7:30 a m  Yesterday evening  Pain radiates from the right flank to the right groin  Has had decreasing urination  Denies gross hematuria, denies dysuria  No fevers or chills, no nausea or vomiting  On exam-no acute distress, heart regular, no respiratory distress, abdomen soft with mild tenderness on the right side, positive right CVA tenderness    Plan-CT stone study, check labs and urine, pain control    ED Course         Critical Care Time  Procedures

## 2021-03-10 DIAGNOSIS — Z23 ENCOUNTER FOR IMMUNIZATION: ICD-10-CM

## 2021-03-21 ENCOUNTER — IMMUNIZATIONS (OUTPATIENT)
Dept: FAMILY MEDICINE CLINIC | Facility: HOSPITAL | Age: 51
End: 2021-03-21

## 2021-03-21 DIAGNOSIS — Z23 ENCOUNTER FOR IMMUNIZATION: Primary | ICD-10-CM

## 2021-03-21 PROCEDURE — 0001A SARS-COV-2 / COVID-19 MRNA VACCINE (PFIZER-BIONTECH) 30 MCG: CPT

## 2021-03-21 PROCEDURE — 91300 SARS-COV-2 / COVID-19 MRNA VACCINE (PFIZER-BIONTECH) 30 MCG: CPT

## 2021-04-11 ENCOUNTER — IMMUNIZATIONS (OUTPATIENT)
Dept: FAMILY MEDICINE CLINIC | Facility: HOSPITAL | Age: 51
End: 2021-04-11

## 2021-04-11 DIAGNOSIS — Z23 ENCOUNTER FOR IMMUNIZATION: Primary | ICD-10-CM

## 2021-04-11 PROCEDURE — 0002A SARS-COV-2 / COVID-19 MRNA VACCINE (PFIZER-BIONTECH) 30 MCG: CPT

## 2021-04-11 PROCEDURE — 91300 SARS-COV-2 / COVID-19 MRNA VACCINE (PFIZER-BIONTECH) 30 MCG: CPT

## 2021-04-14 DIAGNOSIS — M54.16 RADICULOPATHY, LUMBAR REGION: ICD-10-CM

## 2021-04-14 DIAGNOSIS — M54.12 CERVICAL RADICULOPATHY: ICD-10-CM

## 2021-04-14 RX ORDER — DULOXETIN HYDROCHLORIDE 60 MG/1
CAPSULE, DELAYED RELEASE ORAL
Qty: 30 CAPSULE | Refills: 2 | OUTPATIENT
Start: 2021-04-14

## 2021-05-09 DIAGNOSIS — M54.16 RADICULOPATHY, LUMBAR REGION: ICD-10-CM

## 2021-05-09 DIAGNOSIS — M54.12 CERVICAL RADICULOPATHY: ICD-10-CM

## 2021-05-09 DIAGNOSIS — M54.2 NECK PAIN: ICD-10-CM

## 2021-05-09 DIAGNOSIS — M51.36 DDD (DEGENERATIVE DISC DISEASE), LUMBAR: ICD-10-CM

## 2021-05-09 DIAGNOSIS — M47.812 SPONDYLOSIS OF CERVICAL SPINE WITHOUT MYELOPATHY: ICD-10-CM

## 2021-05-09 DIAGNOSIS — M47.816 LUMBAR SPONDYLOSIS: ICD-10-CM

## 2021-05-09 RX ORDER — DULOXETIN HYDROCHLORIDE 30 MG/1
30 CAPSULE, DELAYED RELEASE ORAL DAILY
Qty: 30 CAPSULE | Refills: 0 | Status: CANCELLED | OUTPATIENT
Start: 2021-05-09

## 2021-05-09 RX ORDER — DULOXETIN HYDROCHLORIDE 60 MG/1
60 CAPSULE, DELAYED RELEASE ORAL DAILY
Qty: 30 CAPSULE | Refills: 0 | Status: CANCELLED | OUTPATIENT
Start: 2021-05-09

## 2021-05-10 ENCOUNTER — TELEPHONE (OUTPATIENT)
Dept: PAIN MEDICINE | Facility: MEDICAL CENTER | Age: 51
End: 2021-05-10

## 2021-05-10 DIAGNOSIS — M47.816 LUMBAR SPONDYLOSIS: ICD-10-CM

## 2021-05-10 DIAGNOSIS — M54.12 CERVICAL RADICULOPATHY: ICD-10-CM

## 2021-05-10 DIAGNOSIS — M47.812 SPONDYLOSIS OF CERVICAL SPINE WITHOUT MYELOPATHY: ICD-10-CM

## 2021-05-10 DIAGNOSIS — M54.16 RADICULOPATHY, LUMBAR REGION: ICD-10-CM

## 2021-05-10 DIAGNOSIS — M51.36 DDD (DEGENERATIVE DISC DISEASE), LUMBAR: ICD-10-CM

## 2021-05-10 DIAGNOSIS — M54.2 NECK PAIN: ICD-10-CM

## 2021-05-10 RX ORDER — DULOXETIN HYDROCHLORIDE 60 MG/1
CAPSULE, DELAYED RELEASE ORAL
Qty: 30 CAPSULE | Refills: 2 | OUTPATIENT
Start: 2021-05-10

## 2021-05-10 RX ORDER — DULOXETIN HYDROCHLORIDE 30 MG/1
30 CAPSULE, DELAYED RELEASE ORAL DAILY
Qty: 30 CAPSULE | Refills: 1 | Status: SHIPPED | OUTPATIENT
Start: 2021-05-10 | End: 2021-06-07 | Stop reason: SDUPTHER

## 2021-05-10 RX ORDER — DULOXETIN HYDROCHLORIDE 30 MG/1
30 CAPSULE, DELAYED RELEASE ORAL DAILY
Qty: 30 CAPSULE | Refills: 0 | Status: CANCELLED | OUTPATIENT
Start: 2021-05-10

## 2021-05-10 RX ORDER — DULOXETIN HYDROCHLORIDE 60 MG/1
60 CAPSULE, DELAYED RELEASE ORAL DAILY
Qty: 30 CAPSULE | Refills: 0 | Status: CANCELLED | OUTPATIENT
Start: 2021-05-10

## 2021-05-10 RX ORDER — DULOXETIN HYDROCHLORIDE 60 MG/1
60 CAPSULE, DELAYED RELEASE ORAL DAILY
Qty: 30 CAPSULE | Refills: 1 | Status: SHIPPED | OUTPATIENT
Start: 2021-05-10 | End: 2021-06-07 | Stop reason: SDUPTHER

## 2021-05-10 NOTE — TELEPHONE ENCOUNTER
S/w pt  Pt reports that he had been taking 90mg Cymbalta until his prescription for 30mg ran out  He continued with 60mg daily and reported good pain control  He ran out of pills 5/7 and within a day he started with "drilling in his head" that he reports as a side effect from not having his Cymbalta  He was able to obtain 3 "emergency" Cymbalta from his pharmacy and is now requesting a refill for both 30mg and 60 mg pills  He is not scheduled for [facility] and advised that he may have to schedule before refill is given    Please advise

## 2021-05-10 NOTE — TELEPHONE ENCOUNTER
Refills for both 60mg and 30mg sent to pharmacy  Have him start with just 60mg for at least a week, and then can go to 30mg if he feels it's necessary for his pain  Please schedule him for a 4-6 week f/u for future refills

## 2021-05-10 NOTE — TELEPHONE ENCOUNTER
Pt contacted Call Center requested refill of their medication  Medication Name:  Duloxetine     Dosage of Med:  30 mg   Frequency of Med:  1 tab     Remaining Medication:    NONE     Pt contacted Call Center requested refill of their medication          Medication Name:  duloxetine     Dosage of Med:  60 mg     Frequency of Med:  1 tab    Remaining Medication:  None         Pt has not taken medication in 3 days and is getting side effects from not taking it

## 2021-06-02 ENCOUNTER — HOSPITAL ENCOUNTER (OUTPATIENT)
Dept: RADIOLOGY | Facility: HOSPITAL | Age: 51
Discharge: HOME | End: 2021-06-02
Attending: ORTHOPAEDIC SURGERY
Payer: COMMERCIAL

## 2021-06-02 ENCOUNTER — OFFICE VISIT (OUTPATIENT)
Dept: OBGYN CLINIC | Facility: HOSPITAL | Age: 51
End: 2021-06-02
Payer: COMMERCIAL

## 2021-06-02 VITALS
BODY MASS INDEX: 30.92 KG/M2 | WEIGHT: 197 LBS | HEART RATE: 71 BPM | SYSTOLIC BLOOD PRESSURE: 170 MMHG | HEIGHT: 67 IN | DIASTOLIC BLOOD PRESSURE: 98 MMHG

## 2021-06-02 DIAGNOSIS — M25.521 PAIN IN RIGHT ELBOW: ICD-10-CM

## 2021-06-02 DIAGNOSIS — M79.641 RIGHT HAND PAIN: Primary | ICD-10-CM

## 2021-06-02 DIAGNOSIS — M76.62 TENDONITIS, ACHILLES, LEFT: ICD-10-CM

## 2021-06-02 DIAGNOSIS — M79.641 RIGHT HAND PAIN: ICD-10-CM

## 2021-06-02 PROCEDURE — 73130 X-RAY EXAM OF HAND: CPT

## 2021-06-02 PROCEDURE — 99213 OFFICE O/P EST LOW 20 MIN: CPT | Performed by: ORTHOPAEDIC SURGERY

## 2021-06-02 NOTE — PROGRESS NOTES
ASSESSMENT/PLAN:    Assessment:   Right elbow and upper arm pain     Plan:   I do not feel is symptoms are related to the elbow I recommend the patient keep his appointment with Dr Trisha Morgan for the right shoulder  Follow up with Dr Una Harp for the left achilles    Follow Up:  PRN    _____________________________________________________  CHIEF COMPLAINT:  Chief Complaint   Patient presents with    Right Hand - Pain, Swelling     Last seen 9/9/19 Right small finger base of proximal phalanx non-displaced fracture          SUBJECTIVE:  Jesus Gongora is a 49 y o  male who presents for follow up regarding right hand pain  Patient was last seen 9/9/19 for a right small proximal phalanx fracture  He was placed in an ulnar gutter splint and was instructed to follow up in 3 weeks however has not followed up  He complains of right upper extremity pain with bilateral hand swelling  He reports his pain is predominantly in the upper arm  Patient states he has a know RTC tear and saw Dr Trisha Morgan a few years ago  He reports he has an appointment with Dr Trisha Morgan next week         PAST MEDICAL HISTORY:  Past Medical History:   Diagnosis Date    Chronic back pain     Fibromyalgia     Hypertension     RA (rheumatoid arthritis) ([address] )        PAST SURGICAL HISTORY:  Past Surgical History:   Procedure Laterality Date    CARPAL TUNNEL RELEASE Left     LATERAL EPICONDYLE RELEASE Right 2/25/2016    Procedure: RELEASE EPICONDYLAR ELBOW, LATERAL;  Surgeon: Milvia Lind MD;  [facility];  Service:     TN REPAIR ACHILLES TENDON,PRIMARY Left 4/2/2018    Procedure: REPAIR TENDON ACHILLES;  Surgeon: Pola Paul MD;  [facility];  Service: Orthopedics    TN TENOTOMY ELBOW LATERAL/MEDIAL DEBRIDE OPEN Left 2/12/2019    Procedure: RELEASE EPICONDYLAR ELBOW;  Surgeon: Milvia Lind MD;  [facility];  Service: Orthopedics    ROTATOR CUFF REPAIR Left     ULNAR TUNNEL RELEASE Left     x2 FAMILY HISTORY:  Family History   Problem Relation Age of Onset    Hypertension Mother     Diabetes Mother        SOCIAL HISTORY:  Social History     Tobacco Use    Smoking status: Never Smoker    Smokeless tobacco: Never Used   Substance Use Topics    Alcohol use: No    Drug use: No       MEDICATIONS:    Current Outpatient Medications:     amLODIPine (NORVASC) 10 mg tablet, Take 10 mg by mouth every evening  , Disp: , Rfl:     Butalbital-APAP-Caffeine (FIORICET PO), Take 1 tablet by mouth daily as needed Unknown dose  , Disp: , Rfl:     dexamethasone (DECADRON) 4 mg/mL, 1 mL (4 mg total) by Iontophoresis route 3 (three) times a week, Disp: 30 mL, Rfl: 1    DULoxetine (CYMBALTA) 30 mg delayed release capsule, Take 1 capsule (30 mg total) by mouth daily, Disp: 30 capsule, Rfl: 1    DULoxetine (CYMBALTA) 60 mg delayed release capsule, Take 1 capsule (60 mg total) by mouth daily, Disp: 30 capsule, Rfl: 1    levothyroxine 25 mcg tablet, , Disp: , Rfl: 0    losartan (COZAAR) 100 MG tablet, Take 100 mg by mouth daily, Disp: , Rfl:     meloxicam (MOBIC) 15 mg tablet, Take 15 mg by mouth, Disp: , Rfl:     metoprolol succinate (TOPROL-XL) 50 mg 24 hr tablet, "I take one a day bedtime", Disp: , Rfl:     oxybutynin (DITROPAN-XL) 5 mg 24 hr tablet, Take 5 mg by mouth daily, Disp: , Rfl:     oxybutynin (DITROPAN-XL) 5 mg 24 hr tablet, Take 5 mg by mouth daily, Disp: , Rfl:     pantoprazole (PROTONIX) 40 mg tablet, Take 40 mg by mouth daily, Disp: , Rfl:     tamsulosin (FLOMAX) 0 4 mg, Take 0 4 mg by mouth, Disp: , Rfl:     traMADol (ULTRAM) 50 mg tablet, Take 50 mg by mouth every 6 (six) hours as needed for moderate pain, Disp: , Rfl:     triamcinolone (KENALOG) 0 1 % cream, , Disp: , Rfl:     ALLERGIES:  Allergies   Allergen Reactions    Lisinopril      Other reaction(s):  Other (See Comments)  Cough and rash         Reglan [Metoclopramide] Other (See Comments)     Restless; akisthesia         REVIEW OF SYSTEMS:  Pertinent items are noted in HPI  A comprehensive review of systems was negative  LABS:  HgA1c: No results found for: HGBA1C  BMP:   Lab Results   Component Value Date    GLUCOSE 91 10/27/2015    CALCIUM 9 5 03/06/2021     10/27/2015    K 3 5 03/06/2021    CO2 28 03/06/2021     03/06/2021    BUN 17 03/06/2021    CREATININE 1 16 03/06/2021           _____________________________________________________  PHYSICAL EXAMINATION:  Vital signs: /98   Pulse 71   Ht 5' 7" (1 702 m)   Wt 89 4 kg (197 lb)   BMI 30 85 kg/m²   General: well developed and well nourished, alert, oriented times 3 and appears comfortable  Psychiatric: Normal  HEENT: Trachea Midline, No torticollis  Cardiovascular: No discernable arrhythmia  Pulmonary: No wheezing or stridor  Abdomen: No rebound or guarding  Extremities: No peripheral edema  Skin: No masses, erythema, lacerations, fluctation, ulcerations  Neurovascular: Sensation Intact to the Median, Ulnar, Radial Nerve, Motor Intact to the Median, Ulnar, Radial Nerve and Pulses Intact    MUSCULOSKELETAL EXAMINATION:  Right upper extremity  Full elbow, hand, and wrist range of motion  Non TTP lateral epicondyle, no pain with resisted wrist extension  Non TTP radial tunnel, supinator, medial epicondyle, biceps insertion  Negative tinels elbow  Positive TTP proximal biceps  Strength 5/5 throughout     _____________________________________________________  STUDIES REVIEWED:  Images were reviewed in PACS by Dr Virginia Howe and demonstrate: right hand healed fracture proximal phalanx base  Old fracture of ring finger and small finger metacarpal neck fractures        PROCEDURES PERFORMED:  Procedures  No Procedures performed today   Scribe Attestation    I,:  Aylin Guerrero am acting as a scribe while in the presence of the attending physician :       I,:  Rolando Portillo MD personally performed the services described in this documentation    as scribed in my presence :

## 2021-06-07 ENCOUNTER — OFFICE VISIT (OUTPATIENT)
Dept: PAIN MEDICINE | Facility: CLINIC | Age: 51
End: 2021-06-07
Payer: COMMERCIAL

## 2021-06-07 VITALS
DIASTOLIC BLOOD PRESSURE: 112 MMHG | SYSTOLIC BLOOD PRESSURE: 181 MMHG | BODY MASS INDEX: 31.08 KG/M2 | HEIGHT: 67 IN | HEART RATE: 65 BPM | WEIGHT: 198 LBS

## 2021-06-07 DIAGNOSIS — M54.16 RADICULOPATHY, LUMBAR REGION: ICD-10-CM

## 2021-06-07 DIAGNOSIS — M47.812 SPONDYLOSIS OF CERVICAL SPINE WITHOUT MYELOPATHY: ICD-10-CM

## 2021-06-07 DIAGNOSIS — M54.12 CERVICAL RADICULOPATHY: ICD-10-CM

## 2021-06-07 DIAGNOSIS — M51.36 DDD (DEGENERATIVE DISC DISEASE), LUMBAR: ICD-10-CM

## 2021-06-07 DIAGNOSIS — M54.2 NECK PAIN: ICD-10-CM

## 2021-06-07 DIAGNOSIS — G89.4 CHRONIC PAIN SYNDROME: Primary | ICD-10-CM

## 2021-06-07 DIAGNOSIS — M47.816 LUMBAR SPONDYLOSIS: ICD-10-CM

## 2021-06-07 PROCEDURE — 99214 OFFICE O/P EST MOD 30 MIN: CPT | Performed by: NURSE PRACTITIONER

## 2021-06-07 RX ORDER — DULOXETIN HYDROCHLORIDE 60 MG/1
60 CAPSULE, DELAYED RELEASE ORAL DAILY
Qty: 30 CAPSULE | Refills: 2 | Status: SHIPPED | OUTPATIENT
Start: 2021-06-07 | End: 2021-10-05 | Stop reason: SDUPTHER

## 2021-06-07 RX ORDER — DULOXETIN HYDROCHLORIDE 30 MG/1
30 CAPSULE, DELAYED RELEASE ORAL DAILY
Qty: 30 CAPSULE | Refills: 2 | Status: SHIPPED | OUTPATIENT
Start: 2021-06-07 | End: 2021-10-05 | Stop reason: SDUPTHER

## 2021-06-07 NOTE — PROGRESS NOTES
Assessment:  1  Chronic pain syndrome    2  Radiculopathy, lumbar region    3  Cervical radiculopathy    4  Lumbar spondylosis    5  DDD (degenerative disc disease), lumbar    6  Spondylosis of cervical spine without myelopathy    7  Neck pain        Plan:  1  I will schedule the patient for repeat L4-5 LESI to address the inflammatory component the patient's pain  Complete risks and benefits including bleeding, infection, tissue reaction, nerve injury and allergic reaction were discussed  The patient was agreeable and verbalized an understanding  2  The patient may continue duloxetine 90 mg daily as prescribed  This medication was refilled today  3  Patient may continue with chiropractic therapy  4  Patient may continue tramadol meloxicam as prescribed by his PCP   5  The patient will continue to follow with orthopedics as scheduled   6  We can repeat cervical epidural steroid injection p r n    7  The patient will follow-up after his procedure or sooner if needed    Of note regarding patient's hypertension, patient states this is the "normal" for him  He states he has not taken his BP meds yet this morning  He denies any headaches, blurred vision, dizziness  I did instruct him to follow up with his PCP as his BP seems to chronically be significantly elevated despite his medication regimen  The patient was agreeable and verbalized an understanding  M*Modal software was used to dictate this note  It may contain errors with dictating incorrect words or incorrect spelling  Please contact the provider directly with any questions  History of Present Illness:     The patient is a 49 y o  male with a history of fibromyalgia and osteoarthritis of bilateral knees last seen on 12/18/21 who presents for a follow up office visit in regards to chronic low back pain that radiates into the bilateral lower extremities with associated numbness, paresthesias and subjective weakness secondary to  Lumbar degenerative disc disease, lumbar spondylosis, lumbar stenosis, lumbar radiculopathy and chronic pain syndrome  The patient denies bowel or bladder incontinence or saddle anesthesia  Neck pain is improving with chiropractic therapy  He has also had good relief with cervical epidural steroid injection in the past   His main complaint today is his low back symptoms  He has had good relief with L4-5 LESI in September 2020  He would be interested in repeating this at this time  He is following with Orthopedics for hand, shoulder and knee complaints  MRI of the lumbar spine from September 2020 reveals mild canal and foraminal stenosis at L3-4, mild canal and moderate foraminal stenosis at L4-5, and mild facet arthropathy from L3-4 to L5-S1  MRI of the cervical spine reveals multilevel canal and foraminal stenosis from C3-4 to C7-T1, however C6-7 is normal      the patient rates his pain a 7/10 on the numeric pain rating scale  He states the pain is constant nature and bothersome the morning  Characterizes the pain as dull aching, pressure like, numbness and pins and needles  Current pain medications includes:  Duloxetine 90 mg daily, meloxicam 15 mg daily p r n  and tramadol 50 mg p r n  as prescribed by his PCP    The patient reports that this regimen is providing 40% pain relief  The patient is reporting no side effects from this pain medication regimen  I have personally reviewed and/or updated the patient's past medical history, past surgical history, family history, social history, current medications, allergies, and vital signs today  Review of Systems:    Review of Systems   Respiratory: Negative for shortness of breath  Cardiovascular: Negative for chest pain  Gastrointestinal: Negative for constipation, diarrhea, nausea and vomiting  Musculoskeletal: Positive for gait problem  Negative for arthralgias, joint swelling and myalgias  Skin: Negative for rash     Neurological: Negative for dizziness, seizures and weakness  All other systems reviewed and are negative          Past Medical History:   Diagnosis Date    Chronic back pain     Fibromyalgia     Hypertension     RA [facility]rheumatoid arthritis) ([facility] [address] )        Past Surgical History:   Procedure Laterality Date    CARPAL TUNNEL RELEASE Left     LATERAL EPICONDYLE RELEASE Right 2/25/2016    Procedure: RELEASE EPICONDYLAR ELBOW, LATERAL;  Surgeon: Bria Desai MD;  [facility]: [facility];  Service:     NM REPAIR ACHILLES TENDON,PRIMARY Left 4/2/2018    Procedure: REPAIR TENDON ACHILLES;  Surgeon: Frank Espinosa MD;  [facility];  Service: Orthopedics    NM TENOTOMY ELBOW LATERAL/MEDIAL DEBRIDE OPEN Left 2/12/2019    Procedure: RELEASE EPICONDYLAR ELBOW;  Surgeon: Bria Desai MD;  [facility];  Service: Orthopedics    ROTATOR CUFF REPAIR Left     ULNAR TUNNEL RELEASE Left     x2       Family History   Problem Relation Age of Onset    Hypertension Mother     Diabetes Mother        Social History     Occupational History    Not on file   Tobacco Use    Smoking status: Never Smoker    Smokeless tobacco: Never Used   Substance and Sexual Activity    Alcohol use: No    Drug use: No    Sexual activity: Yes         Current Outpatient Medications:     amLODIPine (NORVASC) 10 mg tablet, Take 10 mg by mouth every evening  , Disp: , Rfl:     Butalbital-APAP-Caffeine (FIORICET PO), Take 1 tablet by mouth daily as needed Unknown dose  , Disp: , Rfl:     dexamethasone (DECADRON) 4 mg/mL, 1 mL (4 mg total) by Iontophoresis route 3 (three) times a week, Disp: 30 mL, Rfl: 1    DULoxetine (CYMBALTA) 30 mg delayed release capsule, Take 1 capsule (30 mg total) by mouth daily, Disp: 30 capsule, Rfl: 2    DULoxetine (CYMBALTA) 60 mg delayed release capsule, Take 1 capsule (60 mg total) by mouth daily, Disp: 30 capsule, Rfl: 2    levothyroxine 25 mcg tablet, , Disp: , Rfl: 0    losartan (COZAAR) 100 MG tablet, Take 100 mg by mouth daily, Disp: , Rfl:     meloxicam (MOBIC) 15 mg tablet, Take 15 mg by mouth, Disp: , Rfl:     metoprolol succinate (TOPROL-XL) 50 mg 24 hr tablet, "I take one a day bedtime", Disp: , Rfl:     oxybutynin (DITROPAN-XL) 5 mg 24 hr tablet, Take 5 mg by mouth daily, Disp: , Rfl:     oxybutynin (DITROPAN-XL) 5 mg 24 hr tablet, Take 5 mg by mouth daily, Disp: , Rfl:     pantoprazole (PROTONIX) 40 mg tablet, Take 40 mg by mouth daily, Disp: , Rfl:     traMADol (ULTRAM) 50 mg tablet, Take 50 mg by mouth every 6 (six) hours as needed for moderate pain, Disp: , Rfl:     triamcinolone (KENALOG) 0 1 % cream, , Disp: , Rfl:     tamsulosin (FLOMAX) 0 4 mg, Take 0 4 mg by mouth, Disp: , Rfl:     Allergies   Allergen Reactions    Lisinopril      Other reaction(s): Other (See Comments)  Cough and rash         Reglan [Metoclopramide] Other (See Comments)     Restless; akisthesia         Physical Exam:    BP (!) 181/112   Pulse 65   Ht 5' 7" (1 702 m)   Wt 89 8 kg (198 lb)   BMI 31 01 kg/m²     Constitutional:normal, well developed, well nourished, alert, in no distress and non-toxic and no overt pain behavior    Eyes:anicteric  HEENT:grossly intact  Neck:supple, symmetric, trachea midline and no masses   Pulmonary:even and unlabored  Cardiovascular:No edema or pitting edema present  Skin:Normal without rashes or lesions and well hydrated  Psychiatric:Mood and affect appropriate  Neurologic:Cranial Nerves II-XII grossly intact  Musculoskeletal:antalgic gait but steady without the use of assistive devices      Imaging  FL spine and pain procedure    (Results Pending)      imaging reviewed    Orders Placed This Encounter   Procedures    FL spine and pain procedure

## 2021-06-07 NOTE — PATIENT INSTRUCTIONS
Epidural Steroid Injection   AMBULATORY CARE:   What you need to know about an epidural steroid injection (AMIRA):  An AMIRA is a procedure to inject steroid medicine into the epidural space  The epidural space is between your spinal cord and vertebrae  Steroids reduce inflammation and fluid buildup in your spine that may be causing pain  You may be given pain medicine along with the steroids  How to prepare for an AMIRA:  Your healthcare provider will talk to you about how to prepare for your procedure  He or she will tell you what medicines to take or not take on the day of your procedure  You may need to stop taking blood thinners or other medicines several days before your procedure  You may need to adjust any diabetes medicine you take on the day of your procedure  Steroid medicine can increase your blood sugar level  Arrange for someone to drive you home when you are discharged  What will happen during an AMIRA:   · You will be given medicine to numb the procedure area  You will be awake for the procedure, but you will not feel pain  You may also be given medicine to help you relax  Contrast liquid will be used to help your healthcare provider see the area better  Tell the healthcare provider if you have ever had an allergic reaction to contrast liquid  · Your healthcare provider may place the needle into your neck area, middle of your back, or tailbone area  He may inject the medicine next to the nerves that are causing your pain  He may instead inject the medicine into a larger area of the epidural space  This helps the medicine spread to more nerves  Your healthcare provider will use a fluoroscope to help guide the needle to the right place  A fluoroscope is a type of x-ray  After the procedure, a bandage will be placed over the injection site to prevent infection  What will happen after an AMIRA:  You will have a bandage over the injection site to prevent infection   Your healthcare provider will tell you when you can bathe and any activity guidelines  You will be able to go home  Risks of an AMIRA:  You may have temporary or permanent nerve damage or paralysis  You may have bleeding or develop a serious infection, such as meningitis (swelling of the brain coverings)  An abscess may also develop  An abscess is a pus-filled area under the skin  You may need surgery to fix the abscess  You may have a seizure, anxiety, or trouble sleeping  If you are a man, you may have temporary erectile dysfunction (not able to have an erection)  Call your local emergency number (911 in the [address] [place] [address],[address]) if:   · You have a seizure  · You have trouble moving your legs  Seek care immediately if:   · Blood soaks through your bandage  · You have a fever or chills, severe back pain, and the procedure area is sensitive to the touch  · You cannot control when you urinate or have a bowel movement  Call your doctor if:   · You have weakness or numbness in your legs  · Your wound is red, swollen, or draining pus  · You have nausea or are vomiting  · Your face or neck is red and you feel warm  · You have more pain than you had before the procedure  · You have swelling in your hands or feet  · You have questions or concerns about your condition or care  Care for your wound as directed: You may remove the bandage before you go to bed the day of your procedure  You may take a shower, but do not take a bath for at least 24 hours  Self-care:   · Do not drive,  use machines, or do strenuous activity for 24 hours after your procedure or as directed  · Continue other treatments  as directed  Steroid injections alone will not control your pain  The injections are meant to be used with other treatments, such as physical therapy  Follow up with your doctor as directed:  Write down your questions so you remember to ask them during your visits     © Copyright [employer] 2020 Information is for End User's use only and may not be sold, redistributed or otherwise used for commercial purposes  All illustrations and images included in CareNotes® are the copyrighted property of A D A M , Inc  or Lawrence Myrick  The above information is an  only  It is not intended as medical advice for individual conditions or treatments  Talk to your doctor, nurse or pharmacist before following any medical regimen to see if it is safe and effective for you

## 2021-06-08 ENCOUNTER — OFFICE VISIT (OUTPATIENT)
Dept: OBGYN CLINIC | Facility: HOSPITAL | Age: 51
End: 2021-06-08
Payer: COMMERCIAL

## 2021-06-08 VITALS
WEIGHT: 197 LBS | HEIGHT: 67 IN | BODY MASS INDEX: 30.92 KG/M2 | SYSTOLIC BLOOD PRESSURE: 180 MMHG | DIASTOLIC BLOOD PRESSURE: 102 MMHG | HEART RATE: 72 BPM

## 2021-06-08 DIAGNOSIS — M24.811 INTERNAL DERANGEMENT OF RIGHT SHOULDER: Primary | ICD-10-CM

## 2021-06-08 DIAGNOSIS — M75.101 TEAR OF RIGHT SUPRASPINATUS TENDON: ICD-10-CM

## 2021-06-08 PROCEDURE — 99214 OFFICE O/P EST MOD 30 MIN: CPT | Performed by: ORTHOPAEDIC SURGERY

## 2021-06-08 NOTE — PROGRESS NOTES
Assessment  Diagnoses and all orders for this visit:    Internal derangement of right shoulder    Supraspinatus tendon tear right shoulder  -     MRI shoulder right wo contrast; Future      Discussion and Plan:    Discussed with patient that due to the length of time since his previous MRI we will order a new MRI to better understand the rotator cuff pathology at this time and help guide me in treatment options whether they be surgical or nonsurgical  He agreed with this plan and will follow up after his MRI to discuss a possible surgical option  Subjective:   Patient ID: Fiona Hauser is a 49 y o  male      Patient reports here for follow up of right shoulder  He has had continued pain the right shoulder after previous MRI demonstrated rotator cuff tear  He recently has been having more pain that was extending down his whole arm  He saw Dr Phuong Branch who referred him back here for shoulder pain  The patient has previously had CSI and PT with little relief  The following portions of the patient's history were reviewed and updated as appropriate: allergies, current medications, past family history, past medical history, past social history, past surgical history and problem list     Review of Systems   Constitutional: Negative for chills and fever  HENT: Negative for ear pain and sore throat  Eyes: Negative for pain and visual disturbance  Respiratory: Negative for cough and shortness of breath  Cardiovascular: Negative for chest pain and palpitations  Gastrointestinal: Negative for abdominal pain and vomiting  Genitourinary: Negative for dysuria and hematuria  Musculoskeletal: Negative for arthralgias and back pain  Skin: Negative for color change and rash  Neurological: Negative for seizures and syncope  All other systems reviewed and are negative        Objective:  BP (!) 180/102   Pulse 72   Ht 5' 7" (1 702 m)   Wt 89 4 kg (197 lb)   BMI 30 85 kg/m²       Right Shoulder Exam     Range of Motion   The patient has normal right shoulder ROM  Muscle Strength   External rotation: 5/5   Supraspinatus: 4/5   Subscapularis: 5/5     Tests   Apprehension: negative  Cross arm: negative  Drop arm: positive    Other   Erythema: absent  Scars: absent  Sensation: normal  Pulse: present            Physical Exam  Vitals signs reviewed  HENT:      Head: Normocephalic and atraumatic  Eyes:      General:         Right eye: No discharge  Left eye: No discharge  Conjunctiva/sclera: Conjunctivae normal       Pupils: Pupils are equal, round, and reactive to light  Neck:      Musculoskeletal: Normal range of motion and neck supple  Cardiovascular:      Rate and Rhythm: Normal rate  Pulmonary:      Effort: Pulmonary effort is normal  No respiratory distress  Skin:     General: Skin is warm and dry  Neurological:      Mental Status: He is alert and oriented to person, place, and time  I have personally reviewed pertinent films in PACS and my interpretation is as follows  X-ray of the right shoulder from 01/03/2019 demonstrates no acute fractures or osseous abnormalities      Scribe Attestation    I,:  Cari Hatch am acting as a scribe while in the presence of the attending physician :       I,:  Ruthy Carbajal MD personally performed the services described in this documentation    as scribed in my presence :

## 2021-06-15 ENCOUNTER — APPOINTMENT (OUTPATIENT)
Dept: RADIOLOGY | Facility: AMBULARY SURGERY CENTER | Age: 51
End: 2021-06-15
Attending: ORTHOPAEDIC SURGERY
Payer: COMMERCIAL

## 2021-06-15 ENCOUNTER — HOSPITAL ENCOUNTER (OUTPATIENT)
Dept: RADIOLOGY | Facility: CLINIC | Age: 51
Discharge: HOME | End: 2021-06-15
Attending: ANESTHESIOLOGY | Admitting: ANESTHESIOLOGY
Payer: COMMERCIAL

## 2021-06-15 ENCOUNTER — OFFICE VISIT (OUTPATIENT)
Dept: OBGYN CLINIC | Facility: CLINIC | Age: 51
End: 2021-06-15
Payer: COMMERCIAL

## 2021-06-15 VITALS
SYSTOLIC BLOOD PRESSURE: 160 MMHG | HEART RATE: 53 BPM | WEIGHT: 195 LBS | DIASTOLIC BLOOD PRESSURE: 98 MMHG | BODY MASS INDEX: 30.61 KG/M2 | HEIGHT: 67 IN

## 2021-06-15 VITALS
HEART RATE: 57 BPM | OXYGEN SATURATION: 93 % | RESPIRATION RATE: 20 BRPM | DIASTOLIC BLOOD PRESSURE: 104 MMHG | TEMPERATURE: 98.3 F | SYSTOLIC BLOOD PRESSURE: 158 MMHG

## 2021-06-15 DIAGNOSIS — M76.62 TENDONITIS, ACHILLES, LEFT: ICD-10-CM

## 2021-06-15 DIAGNOSIS — M54.16 RADICULOPATHY, LUMBAR REGION: ICD-10-CM

## 2021-06-15 PROCEDURE — 1036F TOBACCO NON-USER: CPT | Performed by: ORTHOPAEDIC SURGERY

## 2021-06-15 PROCEDURE — 3008F BODY MASS INDEX DOCD: CPT | Performed by: ORTHOPAEDIC SURGERY

## 2021-06-15 PROCEDURE — 73610 X-RAY EXAM OF ANKLE: CPT

## 2021-06-15 PROCEDURE — 99213 OFFICE O/P EST LOW 20 MIN: CPT | Performed by: ORTHOPAEDIC SURGERY

## 2021-06-15 PROCEDURE — 62323 NJX INTERLAMINAR LMBR/SAC: CPT | Performed by: ANESTHESIOLOGY

## 2021-06-15 RX ORDER — METHYLPREDNISOLONE 4 MG/1
TABLET ORAL
Qty: 1 EACH | Refills: 0 | Status: SHIPPED | OUTPATIENT
Start: 2021-06-15 | End: 2021-07-26

## 2021-06-15 RX ORDER — METHYLPREDNISOLONE ACETATE 80 MG/ML
80 INJECTION, SUSPENSION INTRA-ARTICULAR; INTRALESIONAL; INTRAMUSCULAR; PARENTERAL; SOFT TISSUE ONCE
Status: COMPLETED | OUTPATIENT
Start: 2021-06-15 | End: 2021-06-15

## 2021-06-15 RX ADMIN — METHYLPREDNISOLONE ACETATE 80 MG: 80 INJECTION, SUSPENSION INTRA-ARTICULAR; INTRALESIONAL; INTRAMUSCULAR; SOFT TISSUE at 10:57

## 2021-06-15 RX ADMIN — IOHEXOL 1 ML: 300 INJECTION, SOLUTION INTRAVENOUS at 10:57

## 2021-06-15 NOTE — DISCHARGE INSTRUCTIONS
Epidural Steroid Injection   WHAT YOU NEED TO KNOW:   An epidural steroid injection (AMIRA) is a procedure to inject steroid medicine into the epidural space  The epidural space is between your spinal cord and vertebrae  Steroids reduce inflammation and fluid buildup in your spine that may be causing pain  You may be given pain medicine along with the steroids  ACTIVITY  · Do not drive or operate machinery today  · No strenuous activity today - bending, lifting, etc   · You may resume normal activites starting tomorrow - start slowly and as tolerated  · You may shower today, but no tub baths or hot tubs  · You may have numbness for several hours from the local anesthetic  Please use caution and common sense, especially with weight-bearing activities  CARE OF THE INJECTION SITE  · If you have soreness or pain, apply ice to the area today (20 minutes on/20 minutes off)  · Starting tomorrow, you may use warm, moist heat or ice if needed  · You may have an increase or change in your discomfort for 36-48 hours after your treatment  · Apply ice and continue with any pain medication you have been prescribed  · Notify the [facility] if you have any of the following: redness, drainage, swelling, headache, stiff neck or fever above 100°F     SPECIAL INSTRUCTIONS  · Our office will contact you in approximately 7 days for a progress report  MEDICATIONS  · Continue to take all routine medications  · Our office may have instructed you to hold some medications  As no general anesthesia was used in today's procedure, you should not experience any side effects related to anesthesia  If you have a problem specifically related to your procedure, please call our office at [phone]  Problems not related to your procedure should be directed to your primary care physician

## 2021-06-15 NOTE — PROGRESS NOTES
CR Marrero  Attending, Orthopaedic Surgery  [facility] [address] [facility]      ORTHOPAEDIC FOOT AND ANKLE CLINIC VISIT     Assessment:     Encounter Diagnosis   Name Primary?  Tendonitis, Achilles, left             Plan:   · The patient verbalized understanding of exam findings and treatment plan  We engaged in the shared decision-making process and treatment options were discussed at length with the patient  Surgical and conservative management discussed today along with risks and benefits  · Jazzy Shleby has recently increased his activity by returning to the gym, which has inflamed his achilles  · Prescribed a medrol dosepak to reduce inflammation  · Provided heel lifts to offload the achilles  · Return to formal PT  · We will see the patient back in 7 weeks for a repeat evaluation  If the patient does not have a relief of symptoms then an MRI will be ordered to evaluate the integrity of the tendon  History of Present Illness:   Chief Complaint: left ankle pain    Delmy Murillo is a 49 y o  male who is being seen for left ankle pain  Patient has a a previous achilles rupture which was surgically repaired on 4/2/18 by Dr Jara  Patient states he recently increased his activity by returning to the gym  Pain is localized at achilles with minimal radiating and described as sharp and severe  Patient denies numbness, tingling or radicular pain  Denies history of neuropathy  Patient does not smoke, does not have diabetes and does not take blood thinners  Patient denies family history of anesthesia complications and has not had any complications with anesthesia       Pain/symptom timing:  Worse during the day when active  Pain/symptom context:  Worse with activites and work  Pain/symptom modifying factors:  Rest makes better, activities make worse  Pain/symptom associated signs/symptoms: none    Prior treatment   · NSAIDsYes   · Injections No   · Bracing/Orthotics Yes · Physical Therapy Yes     Orthopedic Surgical History:   See Below    Past Medical, Surgical and Social History:  Past Medical History:  has a past medical history of Chronic back pain, Fibromyalgia, Hypertension, and RA (rheumatoid arthritis) ([facility] [address] )  Problem List: does not have any pertinent problems on file  Past Surgical History:  has a past surgical history that includes Rotator cuff repair (Left); Ulnar tunnel release (Left); Carpal tunnel release (Left); Lateral epicondyle release (Right, 2/25/2016); pr repair achilles tendon,primary (Left, 4/2/2018); and pr tenotomy elbow lateral/medial debride open (Left, 2/12/2019)  Family History: family history includes Diabetes in his mother; Hypertension in his mother  Social History:  reports that he has never smoked  He has never used smokeless tobacco  He reports that he does not drink alcohol and does not use drugs  Current Medications: has a current medication list which includes the following prescription(s): amlodipine, butalbital-apap-caffeine, dexamethasone, duloxetine, duloxetine, levothyroxine, losartan, meloxicam, metoprolol succinate, oxybutynin, oxybutynin, pantoprazole, tamsulosin, tramadol, and triamcinolone  Allergies: is allergic to lisinopril and reglan [metoclopramide]       Review of Systems:  General- denies fever/chills  HEENT- denies hearing loss or sore throat  Eyes- denies eye pain or visual disturbances, denies red eyes  Respiratory- denies cough or SOB  Cardio- denies chest pain or palpitations  GI- denies abdominal pain  Endocrine- denies urinary frequency  Urinary- denies pain with urination  Musculoskeletal- Negative except noted above  Skin- denies rashes or wounds  Neurological- denies dizziness or headache  Psychiatric- denies anxiety or difficulty concentrating    Physical Exam:   /98   Pulse (!) 53   Ht 5' 7" (1 702 m)   Wt 88 5 kg (195 lb)   BMI 30 54 kg/m²   General/Constitutional: No apparent distress: well-nourished and well developed  Eyes: normal ocular motion  Cardio: RRR, Normal S1S2, No m/r/g  Lymphatic: No appreciable lymphadenopathy  Respiratory: Non-labored breathing, CTA b/l no w/c/r  Vascular: No edema, swelling or tenderness, except as noted in detailed exam   Integumentary: No impressive skin lesions present, except as noted in detailed exam   Neuro: No ataxia or tremors noted  Psych: Normal mood and affect, oriented to person, place and time  Appropriate affect  Musculoskeletal: Normal, except as noted in detailed exam and in HPI  Examination    Left    Gait Normal   Musculoskeletal Tender to palpation at achilles tendon    Skin Normal       Nails Normal    Range of Motion  10 degrees dorsiflexion, 30 degrees plantarflexion  Subtalar motion: normal    Stability Stable    Muscle Strength 5/5 tibialis anterior  5/5 gastrocnemius-soleus  5/5 posterior tibialis  5/5 peroneal/eversion strength  5/5 EHL  5/5 FHL    Neurologic Normal    Sensation Intact to light touch throughout sural, saphenous, superficial peroneal, deep peroneal and medial/lateral plantar nerve distributions  [place] 07 filament (10g) testing  deferred  Cardiovascular Brisk capillary refill < 2 seconds,intact DP and PT pulses    Special Tests None      Imaging Studies:   3 views of the left ankle were taken, reviewed and interpreted independently that demonstrate no bony abnormality  Reviewed by me personally  Hosie Domino Lachman, MD  Foot & Ankle Surgery   Department [address]      I personally performed the service  Hosie Domino Lachman, MD

## 2021-06-15 NOTE — H&P
History of Present Illness: The patient is a 49 y o  male who presents with complaints of  Low back and leg pain      Patient Active Problem List   Diagnosis    Patellofemoral disorder of left knee    Injury of left Achilles tendon    Lateral epicondylitis of left elbow    Impingement syndrome of right shoulder    Supraspinatus tendon tear, right, subsequent encounter    Pain in left elbow    Primary osteoarthritis of left knee    Chronic pain of left knee    Radiculopathy, lumbar region    DDD (degenerative disc disease), lumbar    Lumbar spondylosis    Neck pain    Cervical radiculopathy    Spondylosis of cervical spine without myelopathy    Chronic pain syndrome    Patellofemoral disorder of right knee    Internal derangement of right shoulder    Tear of right rotator cuff       Past Medical History:   Diagnosis Date    Chronic back pain     Fibromyalgia     Hypertension     RA (rheumatoid arthritis) ([address] )        Past Surgical History:   Procedure Laterality Date    CARPAL TUNNEL RELEASE Left     LATERAL EPICONDYLE RELEASE Right 2/25/2016    Procedure: RELEASE EPICONDYLAR ELBOW, LATERAL;  Surgeon: Florida Cabral MD;  [facility];  Service:     CA REPAIR ACHILLES TENDON,PRIMARY Left 4/2/2018    Procedure: REPAIR TENDON ACHILLES;  Surgeon: Michael Garcia MD;  [facility];  Service: Orthopedics    CA TENOTOMY ELBOW LATERAL/MEDIAL DEBRIDE OPEN Left 2/12/2019    Procedure: RELEASE EPICONDYLAR ELBOW;  Surgeon: Florida Cabral MD;  [facility];  Service: Orthopedics    ROTATOR CUFF REPAIR Left     ULNAR TUNNEL RELEASE Left     x2         Current Outpatient Medications:     amLODIPine (NORVASC) 10 mg tablet, Take 10 mg by mouth every evening  , Disp: , Rfl:     Butalbital-APAP-Caffeine (FIORICET PO), Take 1 tablet by mouth daily as needed Unknown dose  , Disp: , Rfl:     dexamethasone (DECADRON) 4 mg/mL, 1 mL (4 mg total) by Iontophoresis route 3 (three) times a week, Disp: 30 mL, Rfl: 1    DULoxetine (CYMBALTA) 30 mg delayed release capsule, Take 1 capsule (30 mg total) by mouth daily, Disp: 30 capsule, Rfl: 2    DULoxetine (CYMBALTA) 60 mg delayed release capsule, Take 1 capsule (60 mg total) by mouth daily, Disp: 30 capsule, Rfl: 2    levothyroxine 25 mcg tablet, , Disp: , Rfl: 0    losartan (COZAAR) 100 MG tablet, Take 100 mg by mouth daily, Disp: , Rfl:     meloxicam (MOBIC) 15 mg tablet, Take 15 mg by mouth, Disp: , Rfl:     metoprolol succinate (TOPROL-XL) 50 mg 24 hr tablet, "I take one a day bedtime", Disp: , Rfl:     oxybutynin (DITROPAN-XL) 5 mg 24 hr tablet, Take 5 mg by mouth daily, Disp: , Rfl:     oxybutynin (DITROPAN-XL) 5 mg 24 hr tablet, Take 5 mg by mouth daily, Disp: , Rfl:     pantoprazole (PROTONIX) 40 mg tablet, Take 40 mg by mouth daily, Disp: , Rfl:     tamsulosin (FLOMAX) 0 4 mg, Take 0 4 mg by mouth, Disp: , Rfl:     traMADol (ULTRAM) 50 mg tablet, Take 50 mg by mouth every 6 (six) hours as needed for moderate pain, Disp: , Rfl:     triamcinolone (KENALOG) 0 1 % cream, , Disp: , Rfl:     Current Facility-Administered Medications:     iohexol (OMNIPAQUE) 300 mg/mL injection 50 mL, 50 mL, Epidural, Once, Sandip Galvin,     methylPREDNISolone acetate (DEPO-MEDROL) injection 80 mg, 80 mg, Epidural, Once, Sandip Galvin, DO    Allergies   Allergen Reactions    Lisinopril      Other reaction(s): Other (See Comments)  Cough and rash         Reglan [Metoclopramide] Other (See Comments)     Restless; akisthesia         Physical Exam:   Vitals:    06/15/21 1040   BP: (!) 161/102   Pulse: 70   Resp: 20   Temp: 98 3 °F (36 8 °C)   SpO2: 95%     General: Awake, Alert, Oriented x 3, Mood and affect appropriate  Respiratory: Respirations even and unlabored  Cardiovascular: Peripheral pulses intact; no edema  Musculoskeletal Exam:   Bilateral lumbar paraspinals tender to palpation      ASA Score: 2    Patient/Chart Verification  Patient ID Verified: Verbal  ID Band Applied: No  Consents Confirmed: Procedural, To be obtained in the Pre-Procedure area  H&P( within 30 days) Verified: To be obtained in the Pre-Procedure area  Allergies Reviewed: Yes  Anticoag/NSAID held?: NA  Currently on antibiotics?: No    Assessment:   1   Radiculopathy, lumbar region        Plan: L4-5 LESI

## 2021-06-21 ENCOUNTER — HOSPITAL ENCOUNTER (OUTPATIENT)
Dept: RADIOLOGY | Facility: HOSPITAL | Age: 51
Discharge: HOME | End: 2021-06-21
Attending: ORTHOPAEDIC SURGERY
Payer: COMMERCIAL

## 2021-06-21 DIAGNOSIS — M75.101 TEAR OF RIGHT SUPRASPINATUS TENDON: ICD-10-CM

## 2021-06-21 DIAGNOSIS — M24.811 INTERNAL DERANGEMENT OF RIGHT SHOULDER: ICD-10-CM

## 2021-06-21 PROCEDURE — 73221 MRI JOINT UPR EXTREM W/O DYE: CPT

## 2021-06-21 PROCEDURE — G1004 CDSM NDSC: HCPCS

## 2021-06-22 ENCOUNTER — TELEPHONE (OUTPATIENT)
Dept: PAIN MEDICINE | Facility: CLINIC | Age: 51
End: 2021-06-22

## 2021-06-24 ENCOUNTER — OFFICE VISIT (OUTPATIENT)
Dept: OBGYN CLINIC | Facility: OTHER | Age: 51
End: 2021-06-24
Payer: COMMERCIAL

## 2021-06-24 VITALS
SYSTOLIC BLOOD PRESSURE: 158 MMHG | BODY MASS INDEX: 30.29 KG/M2 | WEIGHT: 193 LBS | HEIGHT: 67 IN | DIASTOLIC BLOOD PRESSURE: 114 MMHG | HEART RATE: 69 BPM

## 2021-06-24 DIAGNOSIS — G89.29 CHRONIC RIGHT SHOULDER PAIN: ICD-10-CM

## 2021-06-24 DIAGNOSIS — M25.511 CHRONIC RIGHT SHOULDER PAIN: ICD-10-CM

## 2021-06-24 DIAGNOSIS — S46.811D TEAR OF RIGHT INFRASPINATUS TENDON, SUBSEQUENT ENCOUNTER: Primary | ICD-10-CM

## 2021-06-24 PROBLEM — S46.811A TEAR OF RIGHT INFRASPINATUS TENDON: Status: ACTIVE | Noted: 2021-06-24

## 2021-06-24 PROCEDURE — 3008F BODY MASS INDEX DOCD: CPT | Performed by: ORTHOPAEDIC SURGERY

## 2021-06-24 PROCEDURE — 99214 OFFICE O/P EST MOD 30 MIN: CPT | Performed by: ORTHOPAEDIC SURGERY

## 2021-06-24 PROCEDURE — 1036F TOBACCO NON-USER: CPT | Performed by: ORTHOPAEDIC SURGERY

## 2021-06-24 PROCEDURE — 20610 DRAIN/INJ JOINT/BURSA W/O US: CPT | Performed by: ORTHOPAEDIC SURGERY

## 2021-06-24 RX ORDER — BUPIVACAINE HYDROCHLORIDE 2.5 MG/ML
2 INJECTION, SOLUTION INFILTRATION; PERINEURAL
Status: COMPLETED | OUTPATIENT
Start: 2021-06-24 | End: 2021-06-24

## 2021-06-24 RX ORDER — BETAMETHASONE SODIUM PHOSPHATE AND BETAMETHASONE ACETATE 3; 3 MG/ML; MG/ML
6 INJECTION, SUSPENSION INTRA-ARTICULAR; INTRALESIONAL; INTRAMUSCULAR; SOFT TISSUE
Status: COMPLETED | OUTPATIENT
Start: 2021-06-24 | End: 2021-06-24

## 2021-06-24 RX ADMIN — BUPIVACAINE HYDROCHLORIDE 2 ML: 2.5 INJECTION, SOLUTION INFILTRATION; PERINEURAL at 08:55

## 2021-06-24 RX ADMIN — BETAMETHASONE SODIUM PHOSPHATE AND BETAMETHASONE ACETATE 6 MG: 3; 3 INJECTION, SUSPENSION INTRA-ARTICULAR; INTRALESIONAL; INTRAMUSCULAR; SOFT TISSUE at 08:55

## 2021-06-24 NOTE — PATIENT INSTRUCTIONS

## 2021-06-24 NOTE — PROGRESS NOTES
Assessment  Diagnoses and all orders for this visit:    Partial Tear of right infrasupraspinatus tendon    Chronic right shoulder pain        Discussion and Plan:    Discuss MRI findings with the patient today, explained there no changes as compared to the last MRI  Patient was provided with a CS injection and a referral to PT for a refresher  Encouraged the patient to incorporate these exercises into his normal workout routine  Follow up as needed  Subjective:   Patient ID: Christian York is a 49 y o  male      HPI  Patient present today to discuss the findings of his right shoulder MRI  He has had continued pain the right shoulder after previous MRI demonstrated rotator cuff tear  He recently has been having more pain that was extending down his whole arm  He saw Dr Rayray Mckinley who referred him back here for shoulder pain  The patient has previously had CSI and PT with little relief      The following portions of the patient's history were reviewed and updated as appropriate: allergies, current medications, past family history, past medical history, past social history, past surgical history and problem list     Review of Systems   Constitutional: Negative for chills and fever  HENT: Negative for drooling and hearing loss  Eyes: Negative for visual disturbance  Respiratory: Negative for cough and shortness of breath  Cardiovascular: Negative for chest pain  Gastrointestinal: Negative for abdominal pain  Skin: Negative for rash  Psychiatric/Behavioral: Negative for agitation  Objective:  BP (!) 158/114   Pulse 69   Ht 5' 7" (1 702 m)   Wt 87 5 kg (193 lb)   BMI 30 23 kg/m²       Right Shoulder Exam     Tenderness   The patient is experiencing no tenderness  Range of Motion   The patient has normal right shoulder ROM      Muscle Strength   Abduction: 5/5   Internal rotation: 5/5   External rotation: 4/5     Tests   Pickens test: positive    Other   Erythema: absent  Sensation: normal  Pulse: present            Physical Exam  Vitals reviewed  Constitutional:       Appearance: He is well-developed  HENT:      Head: Normocephalic  Eyes:      Pupils: Pupils are equal, round, and reactive to light  Pulmonary:      Effort: Pulmonary effort is normal    Skin:     General: Skin is warm and dry  Large joint arthrocentesis: R subacromial bursa  Universal Protocol:  Consent given by: patient  Patient understanding: patient states understanding of the procedure being performed  Site marked: the operative site was marked  Patient identity confirmed: verbally with patient    Supporting Documentation  Indications: pain   Procedure Details  Location: shoulder - R subacromial bursa  Needle size: 22 G  Ultrasound guidance: no  Approach: lateral  Medications administered: 2 mL bupivacaine 0 25 %; 6 mg betamethasone acetate-betamethasone sodium phosphate 6 (3-3) mg/mL    Patient tolerance: patient tolerated the procedure well with no immediate complications  Dressing:  Sterile dressing applied          I have personally reviewed pertinent films in PACS and my interpretation is as follows  MRI right shoulder demonstrates: no interval changes as compared to prior MRI  Partial thickness articular sided infraspinatus tear        Scribe Attestation    I,:  Timmy Roman am acting as a scribe while in the presence of the attending physician :       I,:  Mari Rodney MD personally performed the services described in this documentation    as scribed in my presence :

## 2021-07-27 ENCOUNTER — OFFICE VISIT (OUTPATIENT)
Dept: PAIN MEDICINE | Facility: CLINIC | Age: 51
End: 2021-07-27
Payer: COMMERCIAL

## 2021-07-27 VITALS
WEIGHT: 200 LBS | HEIGHT: 67 IN | HEART RATE: 63 BPM | BODY MASS INDEX: 31.39 KG/M2 | SYSTOLIC BLOOD PRESSURE: 147 MMHG | DIASTOLIC BLOOD PRESSURE: 91 MMHG

## 2021-07-27 DIAGNOSIS — G89.4 CHRONIC PAIN SYNDROME: ICD-10-CM

## 2021-07-27 DIAGNOSIS — M51.36 DDD (DEGENERATIVE DISC DISEASE), LUMBAR: ICD-10-CM

## 2021-07-27 DIAGNOSIS — M54.16 RADICULOPATHY, LUMBAR REGION: ICD-10-CM

## 2021-07-27 DIAGNOSIS — M54.12 CERVICAL RADICULOPATHY: ICD-10-CM

## 2021-07-27 DIAGNOSIS — M47.816 LUMBAR SPONDYLOSIS: Primary | ICD-10-CM

## 2021-07-27 DIAGNOSIS — M79.18 MYOFASCIAL PAIN SYNDROME: ICD-10-CM

## 2021-07-27 DIAGNOSIS — M47.812 SPONDYLOSIS OF CERVICAL SPINE WITHOUT MYELOPATHY: ICD-10-CM

## 2021-07-27 PROCEDURE — 99214 OFFICE O/P EST MOD 30 MIN: CPT | Performed by: NURSE PRACTITIONER

## 2021-07-27 RX ORDER — SILDENAFIL CITRATE 20 MG/1
TABLET ORAL
COMMUNITY
Start: 2021-06-18

## 2021-07-27 RX ORDER — TIZANIDINE 4 MG/1
4 TABLET ORAL EVERY 8 HOURS PRN
Qty: 90 TABLET | Refills: 1 | Status: SHIPPED | OUTPATIENT
Start: 2021-07-27 | End: 2022-02-01 | Stop reason: SDUPTHER

## 2021-07-27 RX ORDER — HYDROCHLOROTHIAZIDE 25 MG/1
25 TABLET ORAL DAILY
COMMUNITY
Start: 2021-07-09 | End: 2022-07-09

## 2021-07-27 NOTE — PROGRESS NOTES
Assessment:  1  Lumbar spondylosis    2  DDD (degenerative disc disease), lumbar    3  Spondylosis of cervical spine without myelopathy    4  Chronic pain syndrome    5  Cervical radiculopathy    6  Radiculopathy, lumbar region    7  Myofascial pain syndrome        Plan:  1  We will schedule the patient for bilateral L3-5 medial branch blocks with intention of moving forward towards radiofrequency ablation if there is an appropriate diagnostic response  The initial blocks will be performed with 2% lidocaine and if an appropriate response is obtained upon review of the patient's pain diary, a confirmatory block will be scheduled  Complete risks and benefits including bleeding, infection, tissue reaction, nerve injury and allergic reaction were discussed  The patient was agreeable and verbalized an understanding  2  I will trial the patient on tizanidine 4 mg q 8 hours p r n  myofascial pain  I advised the patient that they should not drive or operate machinery while on this medication until they see how it affects them, as it could cause lethargy and mental cloudyness  I advised the patient to call our office if they experience any side effects or issues with the medication changes  The patient verbalized an understanding  3  If the patient's lower extremity symptoms return, can repeat L4-5 LESI   4  May consider cervical medial branch blocks once his back pain has improved   5  Patient may continue chiropractic therapy  6  Patient may continue Tramadol and meloxicam as prescribed by his PCP  7  Patient may continue duloxetine 90 mg daily as prescribed  He does not require refills today   8  The patient will follow-up in 6 weeks or sooner if needed     M*Modal software was used to dictate this note  It may contain errors with dictating incorrect words or incorrect spelling  Please contact the provider directly with any questions  History of Present Illness:     The patient is a 49 y o  male with a history of fibromyalgia and osteoarthritis last seen on 6/7/21 who presents for a follow up office visit in regards to chronic low back pain with radiation into the lower extremities and axial neck pain secondary to lumbar degenerative disc disease, lumbar spondylosis, lumbar stenosis, lumbar radiculopathy, cervical spondylosis, and chronic pain syndrome  The patient  Denies bowel or bladder incontinence, balance issues or saddle anesthesia  Patient is status post L4-5 LESI with Dr Alina Sarkar on Thelma 15, 2021  He reports that this procedure improved his lower extremity symptoms, however he continues with significant axial low back pain  He also complains of axial neck pain but prioritizes his low back is most bothersome  He states he has had rhizotomy in the past by an outside pain specialist with relief  He finds some mild relief with chiropractic therapy  He takes duloxetine 90 mg daily, meloxicam 15 mg daily p r n  and tramadol p r n  as prescribed by his PCP  He states he was just given a Medrol Dosepak about 2 weeks ago for his foot and it never helped his back or his neck  He currently rates his pain a 10/10 on the numeric pain rating scale  He states the pain is constant nature and bothersome the morning  Characterizes the pain as pressure-like numbness and pins and needle    I have personally reviewed and/or updated the patient's past medical history, past surgical history, family history, social history, current medications, allergies, and vital signs today  Review of Systems:    Review of Systems   Respiratory: Negative for shortness of breath  Cardiovascular: Negative for chest pain  Gastrointestinal: Negative for constipation, diarrhea, nausea and vomiting  Musculoskeletal: Positive for gait problem  Negative for arthralgias, joint swelling and myalgias  Skin: Negative for rash  Neurological: Negative for dizziness, seizures and weakness     All other systems reviewed and are negative          Past Medical History:   Diagnosis Date    Chronic back pain     Fibromyalgia     Hypertension     [address]he[address]) ([facility] [address] )        Past Surgical History:   Procedure Laterality Date    CARPAL TUNNEL RELEASE Left     LATERAL EPICONDYLE RELEASE Right 2/25/2016    Procedure: RELEASE EPICONDYLAR ELBOW, LATERAL;  Surgeon: Jose Mcleod MD;  [facility];  Service:     NV REPAIR ACHILLES TENDON,PRIMARY Left 4/2/2018    Procedure: REPAIR TENDON ACHILLES;  Surgeon: Tiffanie Arrieta MD;  [facility];  Service: Orthopedics    NV TENOTOMY ELBOW LATERAL/MEDIAL DEBRIDE OPEN Left 2/12/2019    Procedure: RELEASE EPICONDYLAR ELBOW;  Surgeon: Jose Mcleod MD;  [facility];  Service: Orthopedics    ROTATOR CUFF REPAIR Left     ULNAR TUNNEL RELEASE Left     x2       Family History   Problem Relation Age of Onset    Hypertension Mother     Diabetes Mother        Social History     Occupational History    Not on file   Tobacco Use    Smoking status: Never Smoker    Smokeless tobacco: Never Used   Substance and Sexual Activity    Alcohol use: No    Drug use: No    Sexual activity: Yes         Current Outpatient Medications:     DULoxetine (CYMBALTA) 30 mg delayed release capsule, Take 1 capsule (30 mg total) by mouth daily, Disp: 30 capsule, Rfl: 2    DULoxetine (CYMBALTA) 60 mg delayed release capsule, Take 1 capsule (60 mg total) by mouth daily, Disp: 30 capsule, Rfl: 2    hydrochlorothiazide (HYDRODIURIL) 25 mg tablet, Take 25 mg by mouth daily, Disp: , Rfl:     levothyroxine 25 mcg tablet, , Disp: , Rfl: 0    losartan (COZAAR) 100 MG tablet, Take 100 mg by mouth daily, Disp: , Rfl:     meloxicam (MOBIC) 15 mg tablet, Take 15 mg by mouth, Disp: , Rfl:     oxybutynin (DITROPAN-XL) 5 mg 24 hr tablet, Take 5 mg by mouth daily, Disp: , Rfl:     sildenafil (REVATIO) 20 mg tablet, Take 2-5 tablets PO prn prior to sexual activity, Disp: , Rfl:     traMADol (ULTRAM) 50 mg tablet, Take 50 mg by mouth every 6 (six) hours as needed for moderate pain, Disp: , Rfl:     amLODIPine (NORVASC) 10 mg tablet, Take 10 mg by mouth every evening  , Disp: , Rfl:     Butalbital-APAP-Caffeine (FIORICET PO), Take 1 tablet by mouth daily as needed Unknown dose  , Disp: , Rfl:     metoprolol succinate (TOPROL-XL) 50 mg 24 hr tablet, "I take one a day bedtime", Disp: , Rfl:     oxybutynin (DITROPAN-XL) 5 mg 24 hr tablet, Take 5 mg by mouth daily, Disp: , Rfl:     pantoprazole (PROTONIX) 40 mg tablet, Take 40 mg by mouth daily, Disp: , Rfl:     tamsulosin (FLOMAX) 0 4 mg, Take 0 4 mg by mouth, Disp: , Rfl:     tiZANidine (ZANAFLEX) 4 mg tablet, Take 1 tablet (4 mg total) by mouth every 8 (eight) hours as needed for muscle spasms, Disp: 90 tablet, Rfl: 1    triamcinolone (KENALOG) 0 1 % cream, , Disp: , Rfl:     Allergies   Allergen Reactions    Lisinopril      Other reaction(s): Other (See Comments)  Cough and rash         Reglan [Metoclopramide] Other (See Comments)     Restless; akisthesia         Physical Exam:    /91   Pulse 63   Ht 5' 7" (1 702 m)   Wt 90 7 kg (200 lb)   BMI 31 32 kg/m²     Constitutional:normal, well developed, well nourished, alert, in no distress and non-toxic and no overt pain behavior  Eyes:anicteric  HEENT:grossly intact  Neck:supple, symmetric, trachea midline and no masses   Pulmonary:even and unlabored  Cardiovascular:No edema or pitting edema present  Skin:Normal without rashes or lesions and well hydrated  Psychiatric:Mood and affect appropriate  Neurologic:Cranial Nerves II-XII grossly intact  Musculoskeletal:normal gait  Bilateral lumbar paraspinal musculature tender to palpation  Bilateral SI joints nontender to palpation  Negative Juan's test bilaterally      Imaging  FL spine and pain procedure    (Results Pending)   MRI LUMBAR SPINE WITHOUT CONTRAST   INDICATION: [id number]: Radiculopathy, lumbar region     COMPARISON: X-rays dated 2/21/2011   TECHNIQUE: Sagittal T1, sagittal T2, sagittal inversion recovery, axial T1 and axial T2, coronal T2  Imaging performed on 3 0T MRI   IMAGE QUALITY: Diagnostic   FINDINGS:   VERTEBRAL BODIES: There are 5 lumbar type vertebral bodies  Normal alignment of the lumbar spine  No spondylolysis or spondylolisthesis  No scoliosis  No compression fracture  Normal marrow signal is identified within the visualized bony   structures  No discrete marrow lesion  SACRUM: Normal signal within the sacrum  No evidence of insufficiency or stress fracture  DISTAL CORD AND CONUS: Normal size and signal within the distal cord and conus  PARASPINAL SOFT TISSUES: Paraspinal soft tissues are unremarkable  LOWER THORACIC DISC SPACES: Normal disc height and signal  No disc herniation, canal stenosis or foraminal narrowing  LUMBAR DISC SPACES:   L1-L2: Normal    L2-L3: Normal    L3-L4: Disc bulge and facet hypertrophy  Mild canal and mild neuroforaminal stenosis, right worse than left  L4-L5: Small disc bulge and facet arthropathy  Mild canal stenosis  Moderate foraminal stenosis, right worse than left  L5-S1: Small disc bulge and mild facet arthropathy  No canal stenosis and mild foraminal stenosis  IMPRESSION:   Degenerative spondylosis from L3-4 to L5-S1           Orders Placed This Encounter   Procedures    FL spine and pain procedure

## 2021-07-27 NOTE — PATIENT INSTRUCTIONS
Lumbar Facet Block   WHAT YOU NEED TO KNOW:   What do I need to know about a lumbar facet block? A lumbar facet block is a procedure used to decrease inflammation in your lower spine  Medicines are injected at facet joints in your lower back  Facet joints are found at the back of each vertebrae  How do I prepare for the procedure? · Your healthcare provider will talk to you about how to prepare for your procedure  He or she may tell you not to eat or drink anything after midnight on the day of your procedure  Arrange to have someone drive you home after the procedure  · Tell your provider about all the medicines you currently take  He or she will tell you if you need to stop any medicine before the procedure, and when to stop  He or she will tell you what medicines to take or not take on the day of your procedure  · Your provider will also talk to you about your regular pain medicines  He or she may want you to wait a certain amount of time after the procedure before you start them again  This will help him or her see if the facet block worked for you  · You may need blood or urine tests before your procedure  You may also need x-rays, a CT scan, or an MRI  Tell your healthcare provider if you have ever had an allergic reaction to contrast liquid  Do not enter the MRI room with anything metal  Metal can cause serious damage  Tell the provider if you have any metal in or on your body  What will happen during the procedure? · You will lie on your stomach, with your body slightly turned to the side  A pillow may be placed under your abdomen, or you may be asked to bend one or both knees  · A needle will be inserted into the facet joint in your lower back  Your surgeon may use contrast liquid with an x-ray or CT to help guide the needle  He or she will inject medicines, such as steroids, to decrease inflammation  What should I expect after the procedure?   You will be taken to a room to rest until you are fully awake  You will be monitored closely for any problems  Do not get out of bed until your healthcare provider says it is okay  Your provider may have you move the area to see if you still have pain  You may then be able to go home  What are the risks of a lumbar facet block? You may bleed more than expected or get an infection  Nerves, blood vessels, or muscles may be damaged  You may have numbness in other areas  You may still have lower back or leg pain  CARE AGREEMENT:   You have the right to help plan your care  Learn about your health condition and how it may be treated  Discuss treatment options with your healthcare providers to decide what care you want to receive  You always have the right to refuse treatment  The above information is an  only  It is not intended as medical advice for individual conditions or treatments  Talk to your doctor, nurse or pharmacist before following any medical regimen to see if it is safe and effective for you  © Copyright [employer] 2021 Information is for End User's use only and may not be sold, redistributed or otherwise used for commercial purposes  All illustrations and images included in CareNotes® are the copyrighted property of [employer]  or [facility] [address]  Tizanidine (By mouth)   Tizanidine ([id number])  Treats muscle spasticity  Brand Name(s): Zanaflex, Zanaflex Capsule   There may be other brand names for this medicine  When This Medicine Should Not Be Used: This medicine is not right for everyone  Do not use if you had an allergic reaction to tizanidine  How to Use This Medicine:   Capsule, Tablet  · Take your medicine as directed  Your dose may need to be changed several times to find what works best for you  · You may take this medicine with or without food, but always take it the same way every time  Tizanidine works differently depending on whether you take it on an empty stomach or a full stomach   Talk to your doctor if you have any questions about this  · Missed dose: Take a dose as soon as you remember  If it is almost time for your next dose, wait until then and take a regular dose  Do not take extra medicine to make up for a missed dose  · Store the medicine in a closed container at room temperature, away from heat, moisture, and direct light  Drugs and Foods to Avoid:   Ask your doctor or pharmacist before using any other medicine, including over-the-counter medicines, vitamins, and herbal products  · Do not use this medicine together with ciprofloxacin or fluvoxamine  · Some foods and medicines can affect how tizanidine works  Tell your doctor if you are using any of the following:  ? Acyclovir, baclofen, cimetidine, famotidine, ticlopidine, verapamil, zileuton  ? Birth control pills, blood pressure medicine, medicine for heart rhythm problems (such as amiodarone, mexiletine, propafenone), or medicine to treat an infection (such as levofloxacin, moxifloxacin)  · Do not drink alcohol while you are using this medicine  · Tell your doctor if you use anything else that makes you sleepy  Some examples are allergy medicine, narcotic pain medicine, and alcohol  Warnings While Using This Medicine:   · Tell your doctor if you are pregnant or breastfeeding, or if you have kidney disease or liver disease  · This medicine may cause the following problems:  ? Low blood pressure  ? Liver damage  · This medicine may make you dizzy or drowsy  Do not drive or do anything else that could be dangerous until you know how this medicine affects you  Stand or sit up slowly if you are dizzy  · Do not stop using this medicine suddenly  Your doctor will need to slowly decrease your dose before you stop it completely  · Your doctor will do lab tests at regular visits to check on the effects of this medicine  Keep all appointments  · Keep all medicine out of the reach of children   Never share your medicine with anyone  Possible Side Effects While Using This Medicine:   Call your doctor right away if you notice any of these side effects:  · Allergic reaction: Itching or hives, swelling in your face or hands, swelling or tingling in your mouth or throat, chest tightness, trouble breathing  · Dark urine or pale stools, nausea, vomiting, loss of appetite, stomach pain, yellow skin or eyes  · Lightheadedness, dizziness, or fainting  · Seeing or hearing things that are not really there  · Slow heartbeat  If you notice these less serious side effects, talk with your doctor:   · Dry mouth  · Drowsiness or sleepiness  · Weakness  If you notice other side effects that you think are caused by this medicine, tell your doctor  Call your doctor for medical advice about side effects  You may report side effects to FDA at 6-709-FDA-3559  © Copyright [employer] 2021 Information is for End User's use only and may not be sold, redistributed or otherwise used for commercial purposes  The above information is an  only  It is not intended as medical advice for individual conditions or treatments  Talk to your doctor, nurse or pharmacist before following any medical regimen to see if it is safe and effective for you

## 2021-08-03 ENCOUNTER — OFFICE VISIT (OUTPATIENT)
Dept: OBGYN CLINIC | Facility: CLINIC | Age: 51
End: 2021-08-03
Payer: COMMERCIAL

## 2021-08-03 VITALS
BODY MASS INDEX: 31.39 KG/M2 | HEART RATE: 69 BPM | HEIGHT: 67 IN | WEIGHT: 200 LBS | SYSTOLIC BLOOD PRESSURE: 136 MMHG | DIASTOLIC BLOOD PRESSURE: 92 MMHG

## 2021-08-03 DIAGNOSIS — M76.62 TENDONITIS, ACHILLES, LEFT: Primary | ICD-10-CM

## 2021-08-03 PROCEDURE — 99213 OFFICE O/P EST LOW 20 MIN: CPT | Performed by: ORTHOPAEDIC SURGERY

## 2021-08-03 PROCEDURE — 1036F TOBACCO NON-USER: CPT | Performed by: ORTHOPAEDIC SURGERY

## 2021-08-03 PROCEDURE — 3008F BODY MASS INDEX DOCD: CPT | Performed by: ORTHOPAEDIC SURGERY

## 2021-08-03 NOTE — PROGRESS NOTES
CR Humphrey  Attending, Orthopaedic Surgery  [facility] [address] [facility]      ORTHOPAEDIC FOOT AND ANKLE CLINIC VISIT     Assessment:     Encounter Diagnosis   Name Primary?  Tendonitis, Achilles, left Yes            Plan:   · The patient verbalized understanding of exam findings and treatment plan  We engaged in the shared decision-making process and treatment options were discussed at length with the patient  Surgical and conservative management discussed today along with risks and benefits  · Ruth Fowler reports improvement in his pain since last visit  · Gradually return to activities as tolerated  · Continue PT until discharged by physical therapist  · Continue heel lifts until his pain resolves   Return if symptoms worsen or fail to improve  History of Present Illness:   Chief Complaint:   Chief Complaint   Patient presents with    Left Ankle - Follow-up     Og Dalton is a 49 y o  male who is being seen in follow-up for left achilles tendinitis  He previously had a left achilles repair by Dr Kiki Quintanilla on 4/2/18 When we last saw he we recommended heel lifts and PT  Pain has significantly improved  Residual pain is localized at achilles with minimal radiating and described as sharp and mild  Pain/symptom timing:  Worse during the day when active  Pain/symptom context:  Worse with activites and work  Pain/symptom modifying factors:  Rest makes better, activities make worse  Pain/symptom associated signs/symptoms: none    Prior treatment   · NSAIDsYes   · Injections No   · Bracing/Orthotics Yes    · Physical Therapy Yes     Orthopedic Surgical History:   See below     Past Medical, Surgical and Social History:  Past Medical History:  has a past medical history of Chronic back pain, Fibromyalgia, Hypertension, and RA (rheumatoid arthritis) ([facility] [address] )  Problem List: does not have any pertinent problems on file    Past Surgical History:  has a past surgical history that includes Rotator cuff repair (Left); Ulnar tunnel release (Left); Carpal tunnel release (Left); Lateral epicondyle release (Right, 2/25/2016); pr repair achilles tendon,primary (Left, 4/2/2018); and pr tenotomy elbow lateral/medial debride open (Left, 2/12/2019)  Family History: family history includes Diabetes in his mother; Hypertension in his mother  Social History:  reports that he has never smoked  He has never used smokeless tobacco  He reports that he does not drink alcohol and does not use drugs  Current Medications: has a current medication list which includes the following prescription(s): amlodipine, butalbital-apap-caffeine, duloxetine, duloxetine, hydrochlorothiazide, levothyroxine, losartan, meloxicam, metoprolol succinate, oxybutynin, oxybutynin, pantoprazole, sildenafil, tamsulosin, tizanidine, tramadol, and triamcinolone  Allergies: is allergic to lisinopril and reglan [metoclopramide]  Review of Systems:  General- denies fever/chills  HEENT- denies hearing loss or sore throat  Eyes- denies eye pain or visual disturbances, denies red eyes  Respiratory- denies cough or SOB  Cardio- denies chest pain or palpitations  GI- denies abdominal pain  Endocrine- denies urinary frequency  Urinary- denies pain with urination  Musculoskeletal- Negative except noted above  Skin- denies rashes or wounds  Neurological- denies dizziness or headache  Psychiatric- denies anxiety or difficulty concentrating    Physical Exam:   /92   Pulse 69   Ht 5' 7" (1 702 m)   Wt 90 7 kg (200 lb)   BMI 31 32 kg/m²   General/Constitutional: No apparent distress: well-nourished and well developed    Eyes: normal ocular motion  Lymphatic: No appreciable lymphadenopathy  Respiratory: Non-labored breathing  Vascular: No edema, swelling or tenderness, except as noted in detailed exam   Integumentary: No impressive skin lesions present, except as noted in detailed exam   Neuro: No ataxia or tremors noted  Psych: Normal mood and affect, oriented to person, place and time  Appropriate affect  Musculoskeletal: Normal, except as noted in detailed exam and in HPI  Examination    Left    Gait Normal   Musculoskeletal nontender to palpation     Skin Normal   Well-healed incisions  Nails Normal    Range of Motion  20 degrees dorsiflexion, 40 degrees plantarflexion  Subtalar motion: normal    Stability Stable    Muscle Strength 5/5 tibialis anterior  5/5 gastrocnemius-soleus  5/5 posterior tibialis  5/5 peroneal/eversion strength  5/5 EHL  5/5 FHL    Neurologic Normal    Sensation  Intact to light touch throughout sural, saphenous, superficial peroneal, deep peroneal and medial/lateral plantar nerve distributions  [place] 07 filament (10g) testing  deferred  Cardiovascular Brisk capillary refill < 2 seconds,intact DP and PT pulses    Special Tests None      Imaging Studies:   No new imaging    Scribe Attestation    I,:  Annmarie Cook PA-C am acting as a scribe while in the presence of the attending physician :       I,:  Norris Ahumada, MD personally performed the services described in this documentation    as scribed in my presence :               Victorine Molly Lachman, MD  Foot & Ankle Surgery   Department [address]      I personally performed the service  Victorine Molly Lachman, MD

## 2021-08-04 ENCOUNTER — TELEPHONE (OUTPATIENT)
Dept: PAIN MEDICINE | Facility: CLINIC | Age: 51
End: 2021-08-04

## 2021-08-04 NOTE — TELEPHONE ENCOUNTER
----- Message from Astrid Gonzalez sent at 8/4/2021  1:47 PM EDT -----  Regarding: Visit Follow-Up Question  Contact: [phone]  Please if possible can I get seen sooner this lower back pain is to much to bear and think its a major sciatica problem and it's not going away

## 2021-08-24 ENCOUNTER — HOSPITAL ENCOUNTER (OUTPATIENT)
Dept: RADIOLOGY | Facility: CLINIC | Age: 51
Discharge: HOME | End: 2021-08-24
Attending: ANESTHESIOLOGY | Admitting: ANESTHESIOLOGY
Payer: COMMERCIAL

## 2021-08-24 VITALS
DIASTOLIC BLOOD PRESSURE: 104 MMHG | SYSTOLIC BLOOD PRESSURE: 154 MMHG | TEMPERATURE: 97.4 F | OXYGEN SATURATION: 96 % | HEART RATE: 57 BPM | RESPIRATION RATE: 20 BRPM

## 2021-08-24 DIAGNOSIS — M47.816 LUMBAR SPONDYLOSIS: ICD-10-CM

## 2021-08-24 PROCEDURE — 64494 INJ PARAVERT F JNT L/S 2 LEV: CPT | Performed by: ANESTHESIOLOGY

## 2021-08-24 PROCEDURE — 64493 INJ PARAVERT F JNT L/S 1 LEV: CPT | Performed by: ANESTHESIOLOGY

## 2021-08-24 RX ORDER — LIDOCAINE HYDROCHLORIDE 10 MG/ML
10 INJECTION, SOLUTION EPIDURAL; INFILTRATION; INTRACAUDAL; PERINEURAL ONCE
Status: COMPLETED | OUTPATIENT
Start: 2021-08-24 | End: 2021-08-24

## 2021-08-24 RX ADMIN — LIDOCAINE HYDROCHLORIDE 4 ML: 10 INJECTION, SOLUTION EPIDURAL; INFILTRATION; INTRACAUDAL; PERINEURAL at 10:07

## 2021-08-24 RX ADMIN — LIDOCAINE HYDROCHLORIDE 3 ML: 20 INJECTION, SOLUTION EPIDURAL; INFILTRATION; INTRACAUDAL; PERINEURAL at 10:14

## 2021-08-24 NOTE — DISCHARGE INSTRUCTIONS
ACTIVITY  · Please do activities that will bring on the normal pain that we are rating  For example, if vacuuming or walking increases the pain, do that  This will give the most accurate response to the diary  · You may shower, but no tub baths today, or applied heat  CARE OF THE INJECTION SITE  · This area may be numb for several hours after the injection  · Notify the [facility] if you have any of the following:  redness, drainage, swelling, or fever above 100°F     SPECIAL INSTRUCTIONS  · Please return the MBB diary to our office by mail, fax, or drop it off  MEDICATIONS  · Please do not take any break through or short acting pain medications for 8 hours after the block  · Continue to take all routine medications  · Our office may have instructed you to hold some medications  As no general anesthesia was used in today's procedure, you should not experience any side effects related to anesthesia  If you have a problem specifically related to your procedure, please call our office at [phone]  Problems not related to your procedure should be directed to your primary care physician

## 2021-08-24 NOTE — NURSING NOTE
Patient denies symptoms currently  Encouraged the patient to monitor BP at home BID and follow up PCP

## 2021-08-24 NOTE — H&P
History of Present Illness: The patient is a 49 y o  male who presents with complaints of  Low back pain      Patient Active Problem List   Diagnosis    Patellofemoral disorder of left knee    Injury of left Achilles tendon    Lateral epicondylitis of left elbow    Impingement syndrome of right shoulder    Supraspinatus tendon tear, right, subsequent encounter    Pain in left elbow    Primary osteoarthritis of left knee    Chronic pain of left knee    Radiculopathy, lumbar region    DDD (degenerative disc disease), lumbar    Lumbar spondylosis    Neck pain    Cervical radiculopathy    Spondylosis of cervical spine without myelopathy    Chronic pain syndrome    Patellofemoral disorder of right knee    Internal derangement of right shoulder    Tear of right rotator cuff    Tear of right infraspinatus tendon       Past Medical History:   Diagnosis Date    Chronic back pain     Fibromyalgia     Hypertension     RA (rheumatoid arthritis) ([address] )        Past Surgical History:   Procedure Laterality Date    CARPAL TUNNEL RELEASE Left     LATERAL EPICONDYLE RELEASE Right 2/25/2016    Procedure: RELEASE EPICONDYLAR ELBOW, LATERAL;  Surgeon: Anastasia Rasheed MD;  [facility];  Service:     SD REPAIR ACHILLES TENDON,PRIMARY Left 4/2/2018    Procedure: REPAIR TENDON ACHILLES;  Surgeon: Sami Alarcon MD;  [facility];  Service: Orthopedics    SD TENOTOMY ELBOW LATERAL/MEDIAL DEBRIDE OPEN Left 2/12/2019    Procedure: RELEASE EPICONDYLAR ELBOW;  Surgeon: Anastasia Rasheed MD;  [facility];  Service: Orthopedics    ROTATOR CUFF REPAIR Left     ULNAR TUNNEL RELEASE Left     x2         Current Outpatient Medications:     amLODIPine (NORVASC) 10 mg tablet, Take 10 mg by mouth every evening  , Disp: , Rfl:     Butalbital-APAP-Caffeine (FIORICET PO), Take 1 tablet by mouth daily as needed Unknown dose  , Disp: , Rfl:     DULoxetine (CYMBALTA) 30 mg delayed release capsule, Take 1 capsule (30 mg total) by mouth daily, Disp: 30 capsule, Rfl: 2    DULoxetine (CYMBALTA) 60 mg delayed release capsule, Take 1 capsule (60 mg total) by mouth daily, Disp: 30 capsule, Rfl: 2    hydrochlorothiazide (HYDRODIURIL) 25 mg tablet, Take 25 mg by mouth daily, Disp: , Rfl:     levothyroxine 25 mcg tablet, , Disp: , Rfl: 0    losartan (COZAAR) 100 MG tablet, Take 100 mg by mouth daily, Disp: , Rfl:     meloxicam (MOBIC) 15 mg tablet, Take 15 mg by mouth, Disp: , Rfl:     metoprolol succinate (TOPROL-XL) 50 mg 24 hr tablet, "I take one a day bedtime", Disp: , Rfl:     oxybutynin (DITROPAN-XL) 5 mg 24 hr tablet, Take 5 mg by mouth daily, Disp: , Rfl:     oxybutynin (DITROPAN-XL) 5 mg 24 hr tablet, Take 5 mg by mouth daily, Disp: , Rfl:     pantoprazole (PROTONIX) 40 mg tablet, Take 40 mg by mouth daily, Disp: , Rfl:     sildenafil (REVATIO) 20 mg tablet, Take 2-5 tablets PO prn prior to sexual activity, Disp: , Rfl:     tamsulosin (FLOMAX) 0 4 mg, Take 0 4 mg by mouth, Disp: , Rfl:     tiZANidine (ZANAFLEX) 4 mg tablet, Take 1 tablet (4 mg total) by mouth every 8 (eight) hours as needed for muscle spasms, Disp: 90 tablet, Rfl: 1    traMADol (ULTRAM) 50 mg tablet, Take 50 mg by mouth every 6 (six) hours as needed for moderate pain, Disp: , Rfl:     triamcinolone (KENALOG) 0 1 % cream, , Disp: , Rfl:     Current Facility-Administered Medications:     lidocaine (PF) (XYLOCAINE-MPF) 1 % injection 10 mL, 10 mL, Other, Once, Alba Galvin, DO    lidocaine (PF) (XYLOCAINE-MPF) 2 % injection 4 mL, 4 mL, Other, Once, Sandip Galvin, DO    Allergies   Allergen Reactions    Lisinopril      Other reaction(s):  Other (See Comments)  Cough and rash         Reglan [Metoclopramide] Other (See Comments)     Restless; akisthesia         Physical Exam:   Vitals:    08/24/21 0956   BP: 159/99   Pulse: 69   Resp: 20   Temp: (!) 97 4 °F (36 3 °C)   SpO2: 95%     General: Awake, Alert, Oriented x 3, Mood and affect appropriate  Respiratory: Respirations even and unlabored  Cardiovascular: Peripheral pulses intact; no edema  Musculoskeletal Exam:   Bilateral lumbar paraspinals tender to palpation  ASA Score: 2    Patient/Chart Verification  Patient ID Verified: Verbal  ID Band Applied: No  Consents Confirmed: To be obtained in the Pre-Procedure area, Procedural  H&P( within 30 days) Verified: To be obtained in the Pre-Procedure area  Allergies Reviewed: Yes  Anticoag/NSAID held?: NA  Currently on antibiotics?: No    Assessment:   1   Lumbar spondylosis        Plan: B/L L3-5 MBB

## 2021-08-26 DIAGNOSIS — M47.816 LUMBAR SPONDYLOSIS: Primary | ICD-10-CM

## 2021-09-15 ENCOUNTER — OFFICE VISIT (OUTPATIENT)
Dept: OBGYN CLINIC | Facility: HOSPITAL | Age: 51
End: 2021-09-15
Payer: COMMERCIAL

## 2021-09-15 VITALS
HEIGHT: 67 IN | SYSTOLIC BLOOD PRESSURE: 160 MMHG | DIASTOLIC BLOOD PRESSURE: 98 MMHG | WEIGHT: 204 LBS | HEART RATE: 80 BPM | BODY MASS INDEX: 32.02 KG/M2

## 2021-09-15 DIAGNOSIS — G56.21 CUBITAL TUNNEL SYNDROME ON RIGHT: ICD-10-CM

## 2021-09-15 DIAGNOSIS — G56.01 CARPAL TUNNEL SYNDROME ON RIGHT: Primary | ICD-10-CM

## 2021-09-15 PROCEDURE — 99214 OFFICE O/P EST MOD 30 MIN: CPT | Performed by: ORTHOPAEDIC SURGERY

## 2021-09-15 NOTE — PROGRESS NOTES
ASSESSMENT/PLAN:    Assessment:   Right CTS  Right CuTS    Plan: We will obtain an EMG of the patient's right upper extremity  We will see him back after study is complete  He has no formal restrictions at this time  Follow Up: After Testing    To Do Next Visit:       General Discussions:     Carpal Tunnel Syndrome: The anatomy and physiology of carpal tunnel syndrome was discussed with the patient today  Increase pressure localized under the transverse carpal ligament can cause pain, numbness, tingling, or dysesthesias within the median nerve distribution as well as feelings of fatigue, clumsiness, or awkwardness  These symptoms typically occur at night and worse in the morning upon waking  Eventually, untreated carpal tunnel syndrome can result in weakness and permanent loss of muscle within the thenar compartment of the hand  Treatment options were discussed with the patient  Conservative treatment includes nocturnal resting splints to keep the nerve in a neutral position, ergonomic changes within the work or home environment, activity modification, and tendon gliding exercises  Steroid injections within the carpal canal can help a majority of patients, however this is often self-limited in a majority of patients  Surgical intervention to divide the transverse carpal ligament typically results in a long-lasting relief of the patient's complaints, with the recurrence rate of less than 1%  Cubital Tunnel Syndrome: The anatomy and physiology of cubital tunnel syndrome were discussed with the patient today in the office  Typically, increased elbow flexion activities decrease blood flow within the intraneural spaces, resulting in a feeling of numbness, tingling, weakness, or clumsiness within the hand and fingers    Occasionally, anatomic structures such as medial elbow osteophytes, the medial head of the triceps, were subluxing ulnar nerve may result in increased pressure or aggravation at the cubital tunnel  Typical signs and symptoms usually include numbness and tingling within the ring and small finger, weakness with , and weakness with pinch  Conservative treatment and includes nocturnal bracing to keep the elbow in a semi-extended position, activity modification, therapy, and avoiding excessive elbow flexion activities  A majority of patients typically respond to conservative treatment over a period of approximately 3-6 months  EMG/NCV testing of the ulnar nerve at the elbow is not as reliable as carpal tunnel syndrome  Surgical intervention in the form of in situ release of the ulnar nerve at the elbow or ulnar nerve transposition may be required in up to 20% of patients  Operative Discussions:       _____________________________________________________  CHIEF COMPLAINT:  Chief Complaint   Patient presents with    Right Elbow - Pain         SUBJECTIVE:  Chandu Lawton is a 49 y o  male who presents with Pain  Moderate  Intermittant  Sharp, Electric and Aching to the right elbow and Numbness to the right hand  This started  2 week(s) ago as Due to a personal injury  Patient was helping his brother with the steps on a box truck  Patient reports difficulties brushing his teeth and holding his coffee mug  He states that his fingers and forearm get "tight"  He does have known carpal and cubital tunnel since 2016  He has been putting off surgical intervention due to this being his dominate hand  Patient did follow up with Dr Oral Walker who provided him with a CSI injection into his right shoulder and prescribed therapy  Patient said that he received minimal relief but has not proceeded with formal therapy     Radiation: Yes to the  hand and elbow  Associated symptoms: No Complaints  Handedness: right    PAST MEDICAL HISTORY:  Past Medical History:   Diagnosis Date    Chronic back pain     Fibromyalgia     Hypertension     RA [facility]rheumatoid arthritis) ([facility] [address] )        PAST SURGICAL HISTORY:  Past Surgical History:   Procedure Laterality Date    CARPAL TUNNEL RELEASE Left     LATERAL EPICONDYLE RELEASE Right 2/25/2016    Procedure: RELEASE EPICONDYLAR ELBOW, LATERAL;  Surgeon: Niya Pickering MD;  [facility];  Service:     NC REPAIR ACHILLES TENDON,PRIMARY Left 4/2/2018    Procedure: REPAIR TENDON ACHILLES;  Surgeon: Wisam Glasgow MD;  [facility];  Service: Orthopedics    NC TENOTOMY ELBOW LATERAL/MEDIAL DEBRIDE OPEN Left 2/12/2019    Procedure: RELEASE EPICONDYLAR ELBOW;  Surgeon: Niya Pickering MD;  [facility];  Service: Orthopedics    ROTATOR CUFF REPAIR Left     ULNAR TUNNEL RELEASE Left     x2       FAMILY HISTORY:  Family History   Problem Relation Age of Onset    Hypertension Mother     Diabetes Mother        SOCIAL HISTORY:  Social History     Tobacco Use    Smoking status: Never Smoker    Smokeless tobacco: Never Used   Substance Use Topics    Alcohol use: No    Drug use: No       MEDICATIONS:    Current Outpatient Medications:     amLODIPine (NORVASC) 10 mg tablet, Take 10 mg by mouth every evening  , Disp: , Rfl:     DULoxetine (CYMBALTA) 30 mg delayed release capsule, Take 1 capsule (30 mg total) by mouth daily, Disp: 30 capsule, Rfl: 2    DULoxetine (CYMBALTA) 60 mg delayed release capsule, Take 1 capsule (60 mg total) by mouth daily, Disp: 30 capsule, Rfl: 2    hydrochlorothiazide (HYDRODIURIL) 25 mg tablet, Take 25 mg by mouth daily, Disp: , Rfl:     levothyroxine 25 mcg tablet, , Disp: , Rfl: 0    losartan (COZAAR) 100 MG tablet, Take 100 mg by mouth daily, Disp: , Rfl:     meloxicam (MOBIC) 15 mg tablet, Take 15 mg by mouth, Disp: , Rfl:     metoprolol succinate (TOPROL-XL) 50 mg 24 hr tablet, "I take one a day bedtime", Disp: , Rfl:     oxybutynin (DITROPAN-XL) 5 mg 24 hr tablet, Take 5 mg by mouth daily, Disp: , Rfl:     pantoprazole (PROTONIX) 40 mg tablet, Take 40 mg by mouth daily, Disp: , Rfl:     sildenafil (REVATIO) 20 mg tablet, Take 2-5 tablets PO prn prior to sexual activity, Disp: , Rfl:     tiZANidine (ZANAFLEX) 4 mg tablet, Take 1 tablet (4 mg total) by mouth every 8 (eight) hours as needed for muscle spasms, Disp: 90 tablet, Rfl: 1    traMADol (ULTRAM) 50 mg tablet, Take 50 mg by mouth every 6 (six) hours as needed for moderate pain, Disp: , Rfl:     triamcinolone (KENALOG) 0 1 % cream, , Disp: , Rfl:     Butalbital-APAP-Caffeine (FIORICET PO), Take 1 tablet by mouth daily as needed Unknown dose  , Disp: , Rfl:     oxybutynin (DITROPAN-XL) 5 mg 24 hr tablet, Take 5 mg by mouth daily, Disp: , Rfl:     tamsulosin (FLOMAX) 0 4 mg, Take 0 4 mg by mouth, Disp: , Rfl:     ALLERGIES:  Allergies   Allergen Reactions    Lisinopril      Other reaction(s): Other (See Comments)  Cough and rash         Reglan [Metoclopramide] Other (See Comments)     Restless; akisthesia         REVIEW OF SYSTEMS:  Pertinent items are noted in HPI      LABS:  HgA1c: No results found for: HGBA1C  BMP:   Lab Results   Component Value Date    GLUCOSE 91 10/27/2015    CALCIUM 9 5 03/06/2021     10/27/2015    K 3 5 03/06/2021    CO2 28 03/06/2021     03/06/2021    BUN 17 03/06/2021    CREATININE 1 16 03/06/2021         _____________________________________________________  PHYSICAL EXAMINATION:  Vital signs: /98   Pulse 80   Ht 5' 7" (1 702 m)   Wt 92 5 kg (204 lb)   BMI 31 95 kg/m²   General: well developed and well nourished, alert, oriented times 3 and appears comfortable  Psychiatric: Normal  HEENT: Trachea Midline, No torticollis  Cardiovascular: No discernable arrhythmia  Pulmonary: No wheezing or stridor  Abdomen: No rebound or guarding  Extremities: No peripheral edema  Skin: No Erythema  Neurovascular: Pulses Intact    MUSCULOSKELETAL EXAMINATION:  RIGHT SIDE: clawing present to ring and small finger, intrinsics 4/5, apb 4/5, AIN 5/5 positive Tinel's at the carpal tunnel, positive Tinel's at the cubital tunnel, and no subluxating ulnar nerve  _____________________________________________________  STUDIES REVIEWED:  No Studies to review      PROCEDURES PERFORMED:  Procedures  No Procedures performed today   Scribe Attestation    I,:  Juan Casiano am acting as a scribe while in the presence of the attending physician :       I,:  German Workman MD personally performed the services described in this documentation    as scribed in my presence :

## 2021-10-04 ENCOUNTER — TELEPHONE (OUTPATIENT)
Dept: PAIN MEDICINE | Facility: CLINIC | Age: 51
End: 2021-10-04

## 2021-10-05 ENCOUNTER — HOSPITAL ENCOUNTER (OUTPATIENT)
Dept: RADIOLOGY | Facility: CLINIC | Age: 51
Discharge: HOME | End: 2021-10-05
Attending: ANESTHESIOLOGY
Payer: COMMERCIAL

## 2021-10-05 VITALS
TEMPERATURE: 97.5 F | SYSTOLIC BLOOD PRESSURE: 144 MMHG | OXYGEN SATURATION: 97 % | RESPIRATION RATE: 20 BRPM | HEART RATE: 67 BPM | DIASTOLIC BLOOD PRESSURE: 95 MMHG

## 2021-10-05 DIAGNOSIS — M54.16 RADICULOPATHY, LUMBAR REGION: ICD-10-CM

## 2021-10-05 DIAGNOSIS — M47.816 LUMBAR SPONDYLOSIS: ICD-10-CM

## 2021-10-05 DIAGNOSIS — M54.12 CERVICAL RADICULOPATHY: ICD-10-CM

## 2021-10-05 DIAGNOSIS — M51.36 DDD (DEGENERATIVE DISC DISEASE), LUMBAR: ICD-10-CM

## 2021-10-05 DIAGNOSIS — M47.812 SPONDYLOSIS OF CERVICAL SPINE WITHOUT MYELOPATHY: ICD-10-CM

## 2021-10-05 DIAGNOSIS — M54.2 NECK PAIN: ICD-10-CM

## 2021-10-05 RX ORDER — LIDOCAINE HYDROCHLORIDE 10 MG/ML
10 INJECTION, SOLUTION EPIDURAL; INFILTRATION; INTRACAUDAL; PERINEURAL ONCE
Status: COMPLETED | OUTPATIENT
Start: 2021-10-05 | End: 2021-10-05

## 2021-10-05 RX ORDER — BUPIVACAINE HYDROCHLORIDE 5 MG/ML
30 INJECTION, SOLUTION EPIDURAL; INTRACAUDAL ONCE
Status: COMPLETED | OUTPATIENT
Start: 2021-10-05 | End: 2021-10-05

## 2021-10-05 RX ORDER — DULOXETIN HYDROCHLORIDE 30 MG/1
30 CAPSULE, DELAYED RELEASE ORAL DAILY
Qty: 30 CAPSULE | Refills: 2 | Status: SHIPPED | OUTPATIENT
Start: 2021-10-05 | End: 2022-02-03 | Stop reason: SDUPTHER

## 2021-10-05 RX ORDER — DULOXETIN HYDROCHLORIDE 60 MG/1
60 CAPSULE, DELAYED RELEASE ORAL DAILY
Qty: 30 CAPSULE | Refills: 2 | Status: SHIPPED | OUTPATIENT
Start: 2021-10-05 | End: 2022-02-03 | Stop reason: SDUPTHER

## 2021-10-05 RX ADMIN — LIDOCAINE HYDROCHLORIDE 4 ML: 10 INJECTION, SOLUTION EPIDURAL; INFILTRATION; INTRACAUDAL; PERINEURAL at 08:25

## 2021-10-05 RX ADMIN — BUPIVACAINE HYDROCHLORIDE 3 ML: 5 INJECTION, SOLUTION EPIDURAL; INTRACAUDAL at 08:30

## 2021-11-03 ENCOUNTER — HOSPITAL ENCOUNTER (OUTPATIENT)
Dept: NEUROLOGY | Facility: CLINIC | Age: 51
Discharge: HOME | End: 2021-11-03
Payer: COMMERCIAL

## 2021-11-03 DIAGNOSIS — G56.01 CARPAL TUNNEL SYNDROME ON RIGHT: ICD-10-CM

## 2021-11-03 DIAGNOSIS — G56.21 CUBITAL TUNNEL SYNDROME ON RIGHT: ICD-10-CM

## 2021-11-03 PROCEDURE — 95886 MUSC TEST DONE W/N TEST COMP: CPT | Performed by: PSYCHIATRY & NEUROLOGY

## 2021-11-03 PROCEDURE — 95909 NRV CNDJ TST 5-6 STUDIES: CPT | Performed by: PSYCHIATRY & NEUROLOGY

## 2021-12-01 ENCOUNTER — TELEPHONE (OUTPATIENT)
Dept: OBGYN CLINIC | Facility: HOSPITAL | Age: 51
End: 2021-12-01

## 2022-02-01 DIAGNOSIS — M79.18 MYOFASCIAL PAIN SYNDROME: ICD-10-CM

## 2022-02-02 RX ORDER — TIZANIDINE 4 MG/1
4 TABLET ORAL EVERY 8 HOURS PRN
Qty: 90 TABLET | Refills: 0 | Status: SHIPPED | OUTPATIENT
Start: 2022-02-02

## 2022-02-07 ENCOUNTER — OFFICE VISIT (OUTPATIENT)
Dept: OBGYN CLINIC | Facility: CLINIC | Age: 52
End: 2022-02-07
Payer: MEDICARE

## 2022-02-07 VITALS
WEIGHT: 200 LBS | SYSTOLIC BLOOD PRESSURE: 152 MMHG | DIASTOLIC BLOOD PRESSURE: 90 MMHG | BODY MASS INDEX: 31.39 KG/M2 | HEIGHT: 67 IN

## 2022-02-07 DIAGNOSIS — G56.01 CARPAL TUNNEL SYNDROME ON RIGHT: Primary | ICD-10-CM

## 2022-02-07 PROCEDURE — 99214 OFFICE O/P EST MOD 30 MIN: CPT | Performed by: ORTHOPAEDIC SURGERY

## 2022-02-07 RX ORDER — LIDOCAINE HYDROCHLORIDE AND EPINEPHRINE 10; 10 MG/ML; UG/ML
20 INJECTION, SOLUTION INFILTRATION; PERINEURAL ONCE
Status: CANCELLED | OUTPATIENT
Start: 2022-02-07 | End: 2022-02-07

## 2022-02-07 NOTE — PROGRESS NOTES
ASSESSMENT/PLAN:    Assessment:   Carpal Tunnel Syndrome  right    Plan:   EMG/NCS was reviewed in the office today   Right endoscopic carpal tunnel release, under sedation was discussed at length including risks and benefits  Risks of surgery consists of but not limited to bleeding, infection, stiffness, injury to nerves and surrounding structures, need for further surgery, etc    It was discussed with Denver Clinton that numbness/tingling may not resolve completely   Right endoscopic carpal tunnel release informed surgical consent was signed   Follow up in the office 10-14 days after surgery for suture removal     Follow Up: After Surgery    To Do Next Visit:  Sutures out    Operative Discussions:  Endoscopic Carpal Tunnel Release: The anatomy and physiology of carpal tunnel syndrome was discussed with the patient today  Increase pressure localized under the transverse carpal ligament can cause pain, numbness, tingling, or dysesthesias within the median nerve distribution as well as feelings of fatigue, clumsiness, or awkwardness  These symptoms typically occur at night and worse in the morning upon waking  Eventually, untreated carpal tunnel syndrome can result in weakness and permanent loss of muscle within the thenar compartment of the hand  Treatment options were discussed with the patient  Conservative treatment includes nocturnal resting splints to keep the nerve in a neutral position, ergonomic changes within the work or home environment, activity modification, and tendon gliding exercises  Steroid injections within the carpal canal can help a majority of patients, however this is often self-limited in a majority of patients  Surgical intervention to divide the transverse carpal ligament typically results in a long-lasting relief of the patient's complaints, with the recurrence rate of less than 1%  The patient has elected to undergo an endoscopic carpal tunnel release    The 2 incision technique was discussed with the patient, which results in approximately a two-week less recovery time, and less wound complications  In the postoperative period, light activities are allowed immediately, driving is allowed when narcotic medication has stopped, and the bandages may be removed and incision may get wet after 2 days  Heavy activities (lifting more than approximately 10 pounds) will be allowed after follow up appointment in 1-2 weeks  While the pain and discomfort in the hands generally improves rapidly, the numbness and tingling as well as the strength will slowly improve over weeks to months depending on the severity of the carpal tunnel syndrome  Pillar pain was discussed with the patient, which is typically a common but self-limiting condition  The risks of bleeding and infection from the surgery are less than 1%  Risk of recurrence is approximately 0 5%  The risks of nerve injury or nerve damage or damage to the blood vessels is approximately 1 in 1200  The patient has an understanding of the above mentioned discussion  The risks and benefits of the procedure were explained to the patient, which include, but are not limited to: Bleeding, infection, recurrence, pain, scar, damage to tendons, damage to nerves, and damage to blood vessels, failure to give desired results and complications related to anesthesia   These risks, along with alternative conservative treatment options, and postoperative protocols were voiced back and understood by the patient   All questions were answered to the patient's satisfaction   The patient agrees to comply with a standard postoperative protocol, and is willing to proceed   Education was provided via written and auditory forms   There were no barriers to learning   Written handouts regarding wound care, incision and scar care, and general preoperative information was provided to the patient   Prior to surgery, the patient may be requested to stop all anti-inflammatory medications   Prophylactic aspirin, Plavix, and Coumadin may be allowed to be continued   Medications including vitamin E , ginkgo, and fish oil are requested to be stopped approximately one week prior to surgery   Hypertensive medications and beta blockers, if taken, should be continued  _____________________________________________________  CHIEF COMPLAINT:  Chief Complaint   Patient presents with    Right Elbow - Follow-up, Results, Pain, Numbness     EMG 11/3/21         SUBJECTIVE:  Laz Sterling is a 49 y o  male who presents for follow up regarding Carpal Tunnel Syndrome  right and Cubital Tunnel Syndrome  right  Since last visit, Laz Sterling has undergone a RUE EMG  Today there is numbness/tingling to all of his digits on his right hand, including his small finger  This has been ongoing for aprox  1 year  He also notes pain to the lateral aspect of his right elbow  He feels his elbow will lock at times, making elbow extension difficult  He underwent a right lateral epicondylar release performed by Dr Rush Echols in 2016     Handedness: right    PAST MEDICAL HISTORY:  Past Medical History:   Diagnosis Date    Chronic back pain     Fibromyalgia     Hypertension     RA (rheumatoid arthritis) ([facility] [address] )        PAST SURGICAL HISTORY:  Past Surgical History:   Procedure Laterality Date    CARPAL TUNNEL RELEASE Left     LATERAL EPICONDYLE RELEASE Right 2/25/2016    Procedure: RELEASE EPICONDYLAR ELBOW, LATERAL;  Surgeon: Farooq Cutler MD;  Location: [facility];  Service:     KS REPAIR ACHILLES TENDON,PRIMARY Left 4/2/2018    Procedure: REPAIR TENDON ACHILLES;  Surgeon: Neeru Greene MD;  Location: [facility];  Service: Orthopedics    KS TENOTOMY ELBOW LATERAL/MEDIAL DEBRIDE OPEN Left 2/12/2019    Procedure: RELEASE EPICONDYLAR ELBOW;  Surgeon: Farooq Cutler MD;  Location: [facility];  Service: Orthopedics    ROTATOR CUFF REPAIR Left     ULNAR TUNNEL RELEASE Left     x2       FAMILY HISTORY:  Family History   Problem Relation Age of Onset    Hypertension Mother     Diabetes Mother        SOCIAL HISTORY:  Social History     Tobacco Use    Smoking status: Never Smoker    Smokeless tobacco: Never Used   Substance Use Topics    Alcohol use: No    Drug use: No       MEDICATIONS:    Current Outpatient Medications:     amLODIPine (NORVASC) 10 mg tablet, Take 10 mg by mouth every evening  , Disp: , Rfl:     DULoxetine (CYMBALTA) 30 mg delayed release capsule, Take 1 capsule (30 mg total) by mouth daily, Disp: 30 capsule, Rfl: 2    DULoxetine (CYMBALTA) 60 mg delayed release capsule, Take 1 capsule (60 mg total) by mouth daily, Disp: 30 capsule, Rfl: 2    hydrochlorothiazide (HYDRODIURIL) 25 mg tablet, Take 25 mg by mouth daily, Disp: , Rfl:     levothyroxine 25 mcg tablet, , Disp: , Rfl: 0    losartan (COZAAR) 100 MG tablet, Take 100 mg by mouth daily, Disp: , Rfl:     meloxicam (MOBIC) 15 mg tablet, Take 15 mg by mouth, Disp: , Rfl:     metoprolol succinate (TOPROL-XL) 50 mg 24 hr tablet, "I take one a day bedtime", Disp: , Rfl:     oxybutynin (DITROPAN-XL) 5 mg 24 hr tablet, Take 5 mg by mouth daily, Disp: , Rfl:     oxybutynin (DITROPAN-XL) 5 mg 24 hr tablet, Take 5 mg by mouth daily, Disp: , Rfl:     pantoprazole (PROTONIX) 40 mg tablet, Take 40 mg by mouth daily, Disp: , Rfl:     sildenafil (REVATIO) 20 mg tablet, Take 2-5 tablets PO prn prior to sexual activity, Disp: , Rfl:     tiZANidine (ZANAFLEX) 4 mg tablet, Take 1 tablet (4 mg total) by mouth every 8 (eight) hours as needed for muscle spasms, Disp: 90 tablet, Rfl: 0    traMADol (ULTRAM) 50 mg tablet, Take 50 mg by mouth every 6 (six) hours as needed for moderate pain, Disp: , Rfl:     triamcinolone (KENALOG) 0 1 % cream, , Disp: , Rfl:     ALLERGIES:  Allergies   Allergen Reactions    Lisinopril      Other reaction(s):  Other (See Comments)  Cough and rash         Reglan [Metoclopramide] Other (See Comments) Restless; akisthesia         REVIEW OF SYSTEMS:  Pertinent items are noted in HPI  A comprehensive review of systems was negative      LABS:  HgA1c: No results found for: HGBA1C  BMP:   Lab Results   Component Value Date    GLUCOSE 91 10/27/2015    CALCIUM 9 5 03/06/2021     10/27/2015    K 3 5 03/06/2021    CO2 28 03/06/2021     03/06/2021    BUN 17 03/06/2021    CREATININE 1 16 03/06/2021           _____________________________________________________  PHYSICAL EXAMINATION:  Vital signs: /90   Ht 5' 7" (1 702 m)   Wt 90 7 kg (200 lb)   BMI 31 32 kg/m²   General: well developed and well nourished, alert, oriented times 3 and appears comfortable  Psychiatric: Normal  HEENT: Trachea Midline, No torticollis  Cardiovascular: No discernable arrhythmia  Pulmonary: No wheezing or stridor  Abdomen: No rebound or guarding  Extremities: No peripheral edema  Skin: No masses, erythema, lacerations, fluctation, ulcerations  Neurovascular: Sensation Intact to the Median, Ulnar, Radial Nerve, Motor Intact to the Median, Ulnar, Radial Nerve and Pulses Intact    MUSCULOSKELETAL EXAMINATION:    RIGHT SIDE:  Carpal tunnel:  No atrophy thenar muscles, Weakness APB 4+/5, intrinsics strength 5/5, AIN strength 5/5 and Postive Tinel's    _____________________________________________________  STUDIES REVIEWED:  EMG: right carpal tunnel syndrome       PROCEDURES PERFORMED:  Procedures  No Procedures performed today    Scribe Attestation    I,:  Godfrey Muhammad am acting as a scribe while in the presence of the attending physician :       I,:  Farooq Cutler MD personally performed the services described in this documentation    as scribed in my presence :

## 2022-03-04 ENCOUNTER — TELEPHONE (OUTPATIENT)
Dept: OBGYN CLINIC | Facility: HOSPITAL | Age: 52
End: 2022-03-04

## 2022-03-04 NOTE — TELEPHONE ENCOUNTER
Patient sees Dr Richie Garrison  Patient is calling in stating that he is scheduled to have surgery on 5/3/22 to his right hand/wrist  The patient is stating that he is experiencing a lot of pain and not able to sleep at night, the hand is swollen and he is not able to open the pill bottle or water bottle  He is asking if he is able to maybe get an injection before the surgery to help with his pain or if the surgery can be moved up? Please advise          Call back# [phone]

## 2022-03-04 NOTE — TELEPHONE ENCOUNTER
You can move him up to 4/6 for surgery  He cannot get an injection, as that would delay surgery 3 months

## 2022-03-04 NOTE — TELEPHONE ENCOUNTER
Spoke to patient  When asked what his pain regiment is he stated he does everything he can but he doesn't want to take any more pills than he already takes  He wants to come in for an injection or have surgery moved up he wants relief now  The CTS is getting worse and he can barely use his hands daily  Please advise  Can we bring him in prior to sx for an injection?

## 2022-03-17 ENCOUNTER — HOSPITAL ENCOUNTER (EMERGENCY)
Facility: HOSPITAL | Age: 52
Discharge: HOME | End: 2022-03-17
Attending: EMERGENCY MEDICINE | Admitting: EMERGENCY MEDICINE
Payer: MEDICARE

## 2022-03-17 ENCOUNTER — APPOINTMENT (EMERGENCY)
Dept: RADIOLOGY | Facility: HOSPITAL | Age: 52
End: 2022-03-17
Payer: MEDICARE

## 2022-03-17 VITALS
BODY MASS INDEX: 31.32 KG/M2 | DIASTOLIC BLOOD PRESSURE: 103 MMHG | RESPIRATION RATE: 19 BRPM | TEMPERATURE: 97.5 F | SYSTOLIC BLOOD PRESSURE: 164 MMHG | WEIGHT: 200 LBS | HEART RATE: 58 BPM | OXYGEN SATURATION: 96 %

## 2022-03-17 DIAGNOSIS — R07.9 CHEST PAIN, UNSPECIFIED TYPE: Primary | ICD-10-CM

## 2022-03-17 LAB
4HR DELTA HS TROPONIN: 1 NG/L
ALBUMIN SERPL BCP-MCNC: 4.1 G/DL (ref 3.5–5)
ALP SERPL-CCNC: 99 U/L (ref 46–116)
ALT SERPL W P-5'-P-CCNC: 53 U/L (ref 12–78)
ANION GAP SERPL CALCULATED.3IONS-SCNC: 3 MMOL/L (ref 4–13)
AST SERPL W P-5'-P-CCNC: 46 U/L (ref 5–45)
ATRIAL RATE: 65 BPM
ATRIAL RATE: 67 BPM
ATRIAL RATE: 74 BPM
BASOPHILS # BLD AUTO: 0.07 THOUSANDS/ΜL (ref 0–0.1)
BASOPHILS NFR BLD AUTO: 1 % (ref 0–1)
BILIRUB SERPL-MCNC: 0.67 MG/DL (ref 0.2–1)
BUN SERPL-MCNC: 13 MG/DL (ref 5–25)
CALCIUM SERPL-MCNC: 9.2 MG/DL (ref 8.3–10.1)
CARDIAC TROPONIN I PNL SERPL HS: 6 NG/L
CARDIAC TROPONIN I PNL SERPL HS: 7 NG/L
CHLORIDE SERPL-SCNC: 106 MMOL/L (ref 100–108)
CO2 SERPL-SCNC: 28 MMOL/L (ref 21–32)
CREAT SERPL-MCNC: 1.05 MG/DL (ref 0.6–1.3)
EOSINOPHIL # BLD AUTO: 0.21 THOUSAND/ΜL (ref 0–0.61)
EOSINOPHIL NFR BLD AUTO: 3 % (ref 0–6)
ERYTHROCYTE [DISTWIDTH] IN BLOOD BY AUTOMATED COUNT: 12.8 % (ref 11.6–15.1)
GFR SERPL CREATININE-BSD FRML MDRD: 81 ML/MIN/1.73SQ M
GLUCOSE SERPL-MCNC: 83 MG/DL (ref 65–140)
HCT VFR BLD AUTO: 45.4 % (ref 36.5–49.3)
HGB BLD-MCNC: 15.7 G/DL (ref 12–17)
IMM GRANULOCYTES # BLD AUTO: 0.02 THOUSAND/UL (ref 0–0.2)
IMM GRANULOCYTES NFR BLD AUTO: 0 % (ref 0–2)
LYMPHOCYTES # BLD AUTO: 3.21 THOUSANDS/ΜL (ref 0.6–4.47)
LYMPHOCYTES NFR BLD AUTO: 43 % (ref 14–44)
MCH RBC QN AUTO: 30.9 PG (ref 26.8–34.3)
MCHC RBC AUTO-ENTMCNC: 34.6 G/DL (ref 31.4–37.4)
MCV RBC AUTO: 89 FL (ref 82–98)
MONOCYTES # BLD AUTO: 0.77 THOUSAND/ΜL (ref 0.17–1.22)
MONOCYTES NFR BLD AUTO: 10 % (ref 4–12)
NEUTROPHILS # BLD AUTO: 3.16 THOUSANDS/ΜL (ref 1.85–7.62)
NEUTS SEG NFR BLD AUTO: 43 % (ref 43–75)
NRBC BLD AUTO-RTO: 0 /100 WBCS
P AXIS: 56 DEGREES
P AXIS: 62 DEGREES
P AXIS: 68 DEGREES
PLATELET # BLD AUTO: 297 THOUSANDS/UL (ref 149–390)
PMV BLD AUTO: 9.9 FL (ref 8.9–12.7)
POTASSIUM SERPL-SCNC: 3.3 MMOL/L (ref 3.5–5.3)
PR INTERVAL: 148 MS
PR INTERVAL: 154 MS
PR INTERVAL: 158 MS
PROT SERPL-MCNC: 7.9 G/DL (ref 6.4–8.2)
QRS AXIS: 22 DEGREES
QRS AXIS: 27 DEGREES
QRS AXIS: 29 DEGREES
QRSD INTERVAL: 90 MS
QRSD INTERVAL: 94 MS
QRSD INTERVAL: 94 MS
QT INTERVAL: 368 MS
QT INTERVAL: 376 MS
QT INTERVAL: 382 MS
QTC INTERVAL: 397 MS
QTC INTERVAL: 397 MS
QTC INTERVAL: 408 MS
RBC # BLD AUTO: 5.08 MILLION/UL (ref 3.88–5.62)
SODIUM SERPL-SCNC: 137 MMOL/L (ref 136–145)
T WAVE AXIS: 1 DEGREES
T WAVE AXIS: 31 DEGREES
T WAVE AXIS: 4 DEGREES
VENTRICULAR RATE: 65 BPM
VENTRICULAR RATE: 67 BPM
VENTRICULAR RATE: 74 BPM
WBC # BLD AUTO: 7.44 THOUSAND/UL (ref 4.31–10.16)

## 2022-03-17 PROCEDURE — 84484 ASSAY OF TROPONIN QUANT: CPT | Performed by: EMERGENCY MEDICINE

## 2022-03-17 PROCEDURE — 93005 ELECTROCARDIOGRAM TRACING: CPT

## 2022-03-17 PROCEDURE — 85025 COMPLETE CBC W/AUTO DIFF WBC: CPT | Performed by: EMERGENCY MEDICINE

## 2022-03-17 PROCEDURE — 71045 X-RAY EXAM CHEST 1 VIEW: CPT

## 2022-03-17 PROCEDURE — 99285 EMERGENCY DEPT VISIT HI MDM: CPT

## 2022-03-17 PROCEDURE — 36415 COLL VENOUS BLD VENIPUNCTURE: CPT

## 2022-03-17 PROCEDURE — 80053 COMPREHEN METABOLIC PANEL: CPT | Performed by: EMERGENCY MEDICINE

## 2022-03-17 PROCEDURE — 96374 THER/PROPH/DIAG INJ IV PUSH: CPT

## 2022-03-17 PROCEDURE — 93010 ELECTROCARDIOGRAM REPORT: CPT | Performed by: INTERNAL MEDICINE

## 2022-03-17 PROCEDURE — 99285 EMERGENCY DEPT VISIT HI MDM: CPT | Performed by: EMERGENCY MEDICINE

## 2022-03-17 RX ORDER — ASPIRIN 81 MG/1
TABLET, CHEWABLE ORAL
Status: COMPLETED
Start: 2022-03-17 | End: 2022-03-17

## 2022-03-17 RX ORDER — KETOROLAC TROMETHAMINE 30 MG/ML
15 INJECTION, SOLUTION INTRAMUSCULAR; INTRAVENOUS ONCE
Status: COMPLETED | OUTPATIENT
Start: 2022-03-17 | End: 2022-03-17

## 2022-03-17 RX ORDER — ASPIRIN 81 MG/1
324 TABLET, CHEWABLE ORAL ONCE
Status: COMPLETED | OUTPATIENT
Start: 2022-03-17 | End: 2022-03-17

## 2022-03-17 RX ADMIN — KETOROLAC TROMETHAMINE 15 MG: 30 INJECTION, SOLUTION INTRAMUSCULAR at 17:00

## 2022-03-17 RX ADMIN — ASPIRIN 81 MG CHEWABLE TABLET 324 MG: 81 TABLET CHEWABLE at 17:00

## 2022-03-17 NOTE — DISCHARGE INSTRUCTIONS
You have been seen for chest pain  Please trial tylenol and motrin as needed for your symptoms  Return to the emergency department if you develop worsening pain, trouble breathing, lightheadedness or any other symptoms of concern  Please follow up with your PCP by calling the number provided

## 2022-03-17 NOTE — ED PROVIDER NOTES
History  Chief Complaint   Patient presents with    Chest Pain     Pt reports cp half hour ago while painting  Pt states it feels "hard"     Laz Sterling is a 47y o  year old male with PMH of HTN presenting to the [facility] ED for chest pain  Patient has had thirty minutes of left-sided chest pain which does not radiate to the jaw, arm, or back  Constant since onset  The pain is described as a moderate intensity punching sensation which they have not experienced previously  Denies ripping or tearing sensation  Started while painting, not reproduced with palpation or exertion  Patient denies prior history of DVT/PE  The patient has not taken/received any medications at home for relief of symptoms  No reported family history of CAD  Denies history of illicit drug use  Patient denies fevers, dyspnea, cough, N/V/D, abdominal pain, new-onset back pain, leg pain/swelling  History provided by:  Patient   used: No    Chest Pain  Associated symptoms: no abdominal pain, no cough, no fever, no headache, no nausea, no numbness, no palpitations, no shortness of breath, not vomiting and no weakness        Prior to Admission Medications   Prescriptions Last Dose Informant Patient Reported? Taking?    DULoxetine (CYMBALTA) 30 mg delayed release capsule   No No   Sig: Take 1 capsule (30 mg total) by mouth daily   DULoxetine (CYMBALTA) 60 mg delayed release capsule   No No   Sig: Take 1 capsule (60 mg total) by mouth daily   amLODIPine (NORVASC) 10 mg tablet  Self Yes No   Sig: Take 10 mg by mouth every evening     hydrochlorothiazide (HYDRODIURIL) 25 mg tablet   Yes No   Sig: Take 25 mg by mouth daily   levothyroxine 25 mcg tablet  Self Yes No   losartan (COZAAR) 100 MG tablet  Self Yes No   Sig: Take 100 mg by mouth daily   meloxicam (MOBIC) 15 mg tablet  Self Yes No   Sig: Take 15 mg by mouth   metoprolol succinate (TOPROL-XL) 50 mg 24 hr tablet  Self Yes No   Sig: "I take one a day bedtime" oxybutynin (DITROPAN-XL) 5 mg 24 hr tablet  Self Yes No   Sig: Take 5 mg by mouth daily   oxybutynin (DITROPAN-XL) 5 mg 24 hr tablet  Self Yes No   Sig: Take 5 mg by mouth daily   pantoprazole (PROTONIX) 40 mg tablet  Self Yes No   Sig: Take 40 mg by mouth daily   sildenafil (REVATIO) 20 mg tablet   Yes No   Sig: Take 2-5 tablets PO prn prior to sexual activity   tiZANidine (ZANAFLEX) 4 mg tablet   No No   Sig: Take 1 tablet (4 mg total) by mouth every 8 (eight) hours as needed for muscle spasms   traMADol (ULTRAM) 50 mg tablet  Self Yes No   Sig: Take 50 mg by mouth every 6 (six) hours as needed for moderate pain   triamcinolone (KENALOG) 0 1 % cream  Self Yes No      Facility-Administered Medications: None       Past Medical History:   Diagnosis Date    Chronic back pain     Fibromyalgia     Hypertension     RA [facility]umatoid arthritis) ([facility])        Past Surgical History:   Procedure Laterality Date    CARPAL TUNNEL RELEASE Left     LATERAL EPICONDYLE RELEASE Right 2/25/2016    Procedure: RELEASE EPICONDYLAR ELBOW, LATERAL;  Surgeon: Rosa M Aguirre MD;  [facility];  Service:     TN REPAIR ACHILLES TENDON,PRIMARY Left 4/2/2018    Procedure: REPAIR TENDON ACHILLES;  Surgeon: Ernesto Love MD;  Location: [facility];  Service: Orthopedics    TN TENOTOMY ELBOW LATERAL/MEDIAL DEBRIDE OPEN Left 2/12/2019    Procedure: RELEASE EPICONDYLAR ELBOW;  Surgeon: Rosa M Aguirre MD;  Location: [facility];  Service: Orthopedics    ROTATOR CUFF REPAIR Left     ULNAR TUNNEL RELEASE Left     x2       Family History   Problem Relation Age of Onset    Hypertension Mother     Diabetes Mother      I have reviewed and agree with the history as documented      E-Cigarette/Vaping     E-Cigarette/Vaping Substances     Social History     Tobacco Use    Smoking status: Never Smoker    Smokeless tobacco: Never Used   Substance Use Topics    Alcohol use: No    Drug use: No        Review of Systems Constitutional: Negative for chills and fever  HENT: Negative for congestion and rhinorrhea  Eyes: Negative for visual disturbance  Respiratory: Negative for cough and shortness of breath  Cardiovascular: Positive for chest pain  Negative for palpitations and leg swelling  Gastrointestinal: Negative for abdominal distention, abdominal pain, diarrhea, nausea and vomiting  Endocrine: Negative for polyuria  Genitourinary: Negative for dysuria and flank pain  Musculoskeletal: Negative for arthralgias, gait problem and joint swelling  Skin: Negative for rash  Neurological: Negative for syncope, weakness, light-headedness, numbness and headaches  Hematological: Does not bruise/bleed easily  Psychiatric/Behavioral: Negative for behavioral problems and confusion  All other systems reviewed and are negative  Physical Exam  ED Triage Vitals   Temperature Pulse Respirations Blood Pressure SpO2   03/17/22 1525 03/17/22 1525 03/17/22 1525 03/17/22 1526 03/17/22 1525   97 5 °F (36 4 °C) 73 22 (!) 193/135 100 %      Temp Source Heart Rate Source Patient Position - Orthostatic VS BP Location FiO2 (%)   03/17/22 1525 03/17/22 1525 03/17/22 1525 03/17/22 1525 --   Tympanic Monitor Sitting Left arm       Pain Score       03/17/22 1525       10 - Worst Possible Pain             Orthostatic Vital Signs  Vitals:    03/17/22 1526 03/17/22 1745 03/17/22 1830 03/17/22 1915   BP: (!) 193/135 (!) 168/106 (!) 166/109 (!) 164/103   Pulse:  66 64 58   Patient Position - Orthostatic VS:  Lying Lying        Physical Exam  Vitals and nursing note reviewed  Constitutional:       General: He is not in acute distress  Appearance: Normal appearance  He is well-developed  He is obese  He is not ill-appearing, toxic-appearing or diaphoretic  HENT:      Head: Normocephalic and atraumatic  Nose: No congestion or rhinorrhea  Eyes:      General:         Right eye: No discharge  Left eye: No discharge  Cardiovascular:      Rate and Rhythm: Normal rate and regular rhythm  Pulses:           Radial pulses are 2+ on the right side and 2+ on the left side  Heart sounds: No murmur heard  Pulmonary:      Effort: Pulmonary effort is normal  No respiratory distress  Breath sounds: Normal breath sounds  No wheezing or rales  Abdominal:      General: Bowel sounds are normal       Palpations: Abdomen is soft  Tenderness: There is no abdominal tenderness  There is no guarding or rebound  Musculoskeletal:      Cervical back: Normal range of motion  No rigidity  Right lower leg: No edema  Left lower leg: No edema  Skin:     General: Skin is warm  Capillary Refill: Capillary refill takes less than 2 seconds  Neurological:      Mental Status: He is alert and oriented to person, place, and time     Psychiatric:         Mood and Affect: Mood normal          Behavior: Behavior normal          ED Medications  Medications   aspirin chewable tablet 324 mg (324 mg Oral Given 3/17/22 1700)   ketorolac (TORADOL) injection 15 mg (15 mg Intravenous Given 3/17/22 1700)   aspirin 81 mg chewable tablet **ADS Override Pull** (  Override Pull 3/17/22 1722)       Diagnostic Studies  Results Reviewed     Procedure Component Value Units Date/Time    HS Troponin I 4hr [[id number]]  (Normal) Resulted: 03/17/22 1936    Lab Status: Final result Specimen: Blood Updated: 03/17/22 1936     hs TnI 4hr 7 ng/L      Delta 4hr hsTnI 1 ng/L     HS Troponin I 2hr [[id number]] Collected: 03/17/22 1837    Lab Status: No result Specimen: Blood from Arm, Right     HS Troponin 0hr (reflex protocol) [[id number]]  (Normal) Collected: 03/17/22 1544    Lab Status: Final result Specimen: Blood from Arm, Right Updated: 03/17/22 1622     hs TnI 0hr 6 ng/L     Comprehensive metabolic panel [[id number]]  (Abnormal) Collected: 03/17/22 1544    Lab Status: Final result Specimen: Blood from Arm, Right Updated: 03/17/22 1614     Sodium 137 mmol/L      Potassium 3 3 mmol/L      Chloride 106 mmol/L      CO2 28 mmol/L      ANION GAP 3 mmol/L      BUN 13 mg/dL      Creatinine 1 05 mg/dL      Glucose 83 mg/dL      Calcium 9 2 mg/dL      AST 46 U/L      ALT 53 U/L      Alkaline Phosphatase 99 U/L      Total Protein 7 9 g/dL      Albumin 4 1 g/dL      Total Bilirubin 0 67 mg/dL      eGFR 81 ml/min/1 73sq m     Narrative:      National Kidney Disease Foundation guidelines for Chronic Kidney Disease (CKD):     Stage 1 with normal or high GFR (GFR > 90 mL/min/1 73 square meters)    Stage 2 Mild CKD (GFR = 60-89 mL/min/1 73 square meters)    Stage 3A Moderate CKD (GFR = 45-59 mL/min/1 73 square meters)    Stage 3B Moderate CKD (GFR = 30-44 mL/min/1 73 square meters)    Stage 4 Severe CKD (GFR = 15-29 mL/min/1 73 square meters)    Stage 5 End Stage CKD (GFR <15 mL/min/1 73 square meters)  Note: GFR calculation is accurate only with a steady state creatinine    CBC and differential [[id number]] Collected: 03/17/22 1546    Lab Status: Final result Specimen: Blood from Arm, Right Updated: 03/17/22 1606     WBC 7 44 Thousand/uL      RBC 5 08 Million/uL      Hemoglobin 15 7 g/dL      Hematocrit 45 4 %      MCV 89 fL      MCH 30 9 pg      MCHC 34 6 g/dL      RDW 12 8 %      MPV 9 9 fL      Platelets 024 Thousands/uL      nRBC 0 /100 WBCs      Neutrophils Relative 43 %      Immat GRANS % 0 %      Lymphocytes Relative 43 %      Monocytes Relative 10 %      Eosinophils Relative 3 %      Basophils Relative 1 %      Neutrophils Absolute 3 16 Thousands/µL      Immature Grans Absolute 0 02 Thousand/uL      Lymphocytes Absolute 3 21 Thousands/µL      Monocytes Absolute 0 77 Thousand/µL      Eosinophils Absolute 0 21 Thousand/µL      Basophils Absolute 0 07 Thousands/µL                  XR chest 1 view portable   Final Result by Natasha Dyer MD (03/17 1625)      No acute cardiopulmonary disease                    Workstation performed: [id number] Procedures  Procedures      ED Course  ED Course as of 03/17/22 2105   Thu Mar 17, 2022   1544 Procedure Note: EKG  Date/Time: 03/17/22 3:44 PM   Interpreted by: Kevin Delacruz DO  Indications / Diagnosis: Chest pain  ECG reviewed by me, the ED Provider: yes   The EKG demonstrates:  Rhythm: normal sinus rhythm 65 BPM  Intervals: Normal DC and QT intervals  Axis: Normal axis  QRS/Blocks: Normal QRS  ST Changes: No acute ST/T waves changes  No SYED  TWI isolated to III  Comparison: Compared to prior EKG performed on 02/09/2018   1623 Procedure Note: EKG  Date/Time: 03/17/22 4:23 PM   Interpreted by: Kevin Delacruz DO  Indications / Diagnosis: Chest pain  ECG reviewed by me, the ED Provider: yes   The EKG demonstrates:  Rhythm: normal sinus rhythm 74 BPM  Intervals: Normal DC and QT intervals  Axis: Normal axis  QRS/Blocks: Normal QRS  ST Changes: No acute ST/T waves changes  No SYED  TWI isolated to III  Comparison: Compared to prior EKG performed on 03/17/2022   1627 hs TnI 0hr: 6             HEART Risk Score      Most Recent Value   Heart Score Risk Calculator    History 1 Filed at: 03/17/2022 1744   ECG 0 Filed at: 03/17/2022 1744   Age 1 Filed at: 03/17/2022 1744   Risk Factors 1 Filed at: 03/17/2022 1744   Troponin 0 Filed at: 03/17/2022 1744   HEART Score 3 Filed at: 03/17/2022 1744                      SBIRT 20yo+      Most Recent Value   SBIRT (23 yo +)    In order to provide better care to our patients, we are screening all of our patients for alcohol and drug use  Would it be okay to ask you these screening questions? No Filed at: 03/17/2022 1540                MDM  Number of Diagnoses or Management Options  Chest pain, unspecified type  Diagnosis management comments:   47 y o  male presenting for chest pain  Will order CBC, CMP, troponin, EKG, CXR to evaluate for ACS, PTX, pulmonary disease, widened mediastinum    The patient appears comfortable, pulses symmetric in b/l UE: doubt aortic disease  No tachycardia or dyspnea: doubt PE  Reassessment: patient feeling better, reviewed labs and imaging with patient  After diagnostic lab testing and imaging, no definitive abnormality was noted that would explain the patient's symptoms  I explained to the patient the relevant test results  I explained to the patient that although no definitive diagnosis could be made today, the possibility exists that it may be too early in the disease process to diagnose serious illness  I have discussed with the patient our plan to discharge them from the ED and the patient is in agreement with this plan  The patient was provided a written after visit summary with strict RTED precautions  Followup: I have discussed with the patient plan to follow up with their PCP  Contact information provided in AVS        Amount and/or Complexity of Data Reviewed  Clinical lab tests: ordered and reviewed  Tests in the radiology section of CPT®: ordered and reviewed  Review and summarize past medical records: yes    Patient Progress  Patient progress: stable      Disposition  Final diagnoses:   Chest pain, unspecified type     Time reflects when diagnosis was documented in both MDM as applicable and the Disposition within this note     Time User Action Codes Description Comment    3/17/2022  7:37 PM Dary Chawla Add [R07 9] Chest pain, unspecified type       ED Disposition     ED Disposition Condition Date/Time Comment    Discharge Stable u Mar 17, 2022  7:37 PM Juan Pablo Marcus discharge to home/self care  Follow-up Information     Follow up With Specialties Details Why Lizzie Gates MD Internal Medicine Schedule an appointment as soon as possible for a visit  To make appointment for reevaluation in 3-5 days   [address]  [phone]            Discharge Medication List as of 3/17/2022  7:45 PM      CONTINUE these medications which have NOT CHANGED    Details   amLODIPine (NORVASC) 10 mg tablet Take 10 mg by mouth every evening  , Historical Med      !! DULoxetine (CYMBALTA) 30 mg delayed release capsule Take 1 capsule (30 mg total) by mouth daily, Starting Thu 2/3/2022, Normal      !! DULoxetine (CYMBALTA) 60 mg delayed release capsule Take 1 capsule (60 mg total) by mouth daily, Starting Thu 2/3/2022, Normal      hydrochlorothiazide (HYDRODIURIL) 25 mg tablet Take 25 mg by mouth daily, Starting Fri 7/9/2021, Until Sat 7/9/2022, Historical Med      levothyroxine 25 mcg tablet Starting Mon 12/10/2018, Historical Med      losartan (COZAAR) 100 MG tablet Take 100 mg by mouth daily, Starting Mon 9/28/2020, Historical Med      meloxicam (MOBIC) 15 mg tablet Take 15 mg by mouth, Starting Fri 12/14/2018, Historical Med      metoprolol succinate (TOPROL-XL) 50 mg 24 hr tablet "I take one a day bedtime", Historical Med      !! oxybutynin (DITROPAN-XL) 5 mg 24 hr tablet Take 5 mg by mouth daily, Starting Fri 5/1/2020, Historical Med      !! oxybutynin (DITROPAN-XL) 5 mg 24 hr tablet Take 5 mg by mouth daily, Starting Mon 11/9/2020, Historical Med      pantoprazole (PROTONIX) 40 mg tablet Take 40 mg by mouth daily, Starting Fri 7/24/2020, Until Wed 9/15/2021, Historical Med      sildenafil (REVATIO) 20 mg tablet Take 2-5 tablets PO prn prior to sexual activity, Historical Med      tiZANidine (ZANAFLEX) 4 mg tablet Take 1 tablet (4 mg total) by mouth every 8 (eight) hours as needed for muscle spasms, Starting Wed 2/2/2022, Normal      traMADol (ULTRAM) 50 mg tablet Take 50 mg by mouth every 6 (six) hours as needed for moderate pain, Historical Med      triamcinolone (KENALOG) 0 1 % cream Starting Fri 12/11/2020, Historical Med       !! - Potential duplicate medications found  Please discuss with provider  No discharge procedures on file      PDMP Review       Value Time User    PDMP Reviewed  Yes 6/7/2021  8:15 AM Sana Morgan, Brody Ruanoia            ED Provider  Attending physically available and evaluated [place]OL[place] managed the patient along with the ED Attending      Electronically Signed by         Janae Mccrary DO  03/17/22 5578

## 2022-03-18 LAB
ATRIAL RATE: 68 BPM
P AXIS: 60 DEGREES
PR INTERVAL: 154 MS
QRS AXIS: 18 DEGREES
QRSD INTERVAL: 98 MS
QT INTERVAL: 388 MS
QTC INTERVAL: 412 MS
T WAVE AXIS: 7 DEGREES
VENTRICULAR RATE: 68 BPM

## 2022-03-18 PROCEDURE — 93010 ELECTROCARDIOGRAM REPORT: CPT | Performed by: INTERNAL MEDICINE

## 2022-03-18 NOTE — ED ATTENDING ATTESTATION
3/17/2022  ILuisa DO, saw and evaluated the patient  I have discussed the patient with the resident/non-physician practitioner and agree with the resident's/non-physician practitioner's findings, Plan of Care, and MDM as documented in the resident's/non-physician practitioner's note, except where noted  All available labs and Radiology studies were reviewed  I was present for key portions of any procedure(s) performed by the resident/non-physician practitioner and I was immediately available to provide assistance  At this point I agree with the current assessment done in the Emergency Department  I have conducted an independent evaluation of this patient a history and physical is as follows:    47 yom with chest pain presents for left-sided chest pain onset was 30 minutes prior to arrival   Does not radiate  No ripping pain no other modifying factors or associated symptoms looks well  No symptoms right now    Plan is cardiac workup and likely discharge if normal as he has a normal exam    ED Course         Critical Care Time  Procedures

## 2022-03-31 NOTE — PRE-PROCEDURE INSTRUCTIONS
Pre-Surgery Instructions:   Medication Instructions    amLODIPine (NORVASC) 10 mg tablet Patient was instructed by Physician and understands   DULoxetine (CYMBALTA) 30 mg delayed release capsule Patient was instructed by Physician and understands   DULoxetine (CYMBALTA) 60 mg delayed release capsule Patient was instructed by Physician and understands   hydrochlorothiazide (HYDRODIURIL) 25 mg tablet Patient was instructed by Physician and understands  will hold if early am case    levothyroxine 25 mcg tablet Patient was instructed by Physician and understands   losartan (COZAAR) 100 MG tablet Patient was instructed by Physician and understands   meloxicam (MOBIC) 15 mg tablet Patient was instructed by Physician and understands  hold am of sx    metoprolol succinate (TOPROL-XL) 50 mg 24 hr tablet Patient was instructed by Physician and understands   oxybutynin (DITROPAN-XL) 5 mg 24 hr tablet Patient was instructed by Physician and understands   pantoprazole (PROTONIX) 40 mg tablet Patient was instructed by Physician and understands   sildenafil (REVATIO) 20 mg tablet Patient was instructed by Physician and understands   tiZANidine (ZANAFLEX) 4 mg tablet Patient was instructed by Physician and understands   traMADol (ULTRAM) 50 mg tablet Patient was instructed by Physician and understands   triamcinolone (KENALOG) 0 1 % cream Patient was instructed by Physician and understands  Pre shower with hibiclens as instructed by surgeon  You may drive yourself to the hospital   You may take your medications day of surgery  You may eat on the day of your surgery  You may have a dressing, please wear something that will fit over it    **discussed local vs anesthesia: will remain local anesthesia

## 2022-04-05 ENCOUNTER — TELEPHONE (OUTPATIENT)
Dept: OBGYN CLINIC | Facility: MEDICAL CENTER | Age: 52
End: 2022-04-05

## 2022-04-05 NOTE — TELEPHONE ENCOUNTER
Patient sees Dr Dario Ray  Patient calling to confirm ortho surgery tomorrow, advised the surgery schedulers will be calling with surgery time today    understood

## 2022-04-06 ENCOUNTER — HOSPITAL ENCOUNTER (OUTPATIENT)
Facility: HOSPITAL | Age: 52
Setting detail: OUTPATIENT SURGERY
Discharge: HOME | End: 2022-04-06
Attending: ORTHOPAEDIC SURGERY | Admitting: ORTHOPAEDIC SURGERY
Payer: MEDICARE

## 2022-04-06 VITALS
TEMPERATURE: 98 F | SYSTOLIC BLOOD PRESSURE: 150 MMHG | DIASTOLIC BLOOD PRESSURE: 93 MMHG | HEART RATE: 61 BPM | RESPIRATION RATE: 16 BRPM | HEIGHT: 67 IN | BODY MASS INDEX: 29.82 KG/M2 | OXYGEN SATURATION: 99 % | WEIGHT: 190 LBS

## 2022-04-06 DIAGNOSIS — G56.01 CARPAL TUNNEL SYNDROME ON RIGHT: Primary | ICD-10-CM

## 2022-04-06 PROCEDURE — NC001 PR NO CHARGE: Performed by: PHYSICIAN ASSISTANT

## 2022-04-06 PROCEDURE — 29848 WRIST ENDOSCOPY/SURGERY: CPT | Performed by: ORTHOPAEDIC SURGERY

## 2022-04-06 PROCEDURE — 29848 WRIST ENDOSCOPY/SURGERY: CPT | Performed by: PHYSICIAN ASSISTANT

## 2022-04-06 RX ORDER — SENNOSIDES 8.6 MG
650 CAPSULE ORAL EVERY 8 HOURS PRN
Qty: 30 TABLET | Refills: 0 | Status: SHIPPED | OUTPATIENT
Start: 2022-04-06

## 2022-04-06 RX ORDER — LIDOCAINE HYDROCHLORIDE AND EPINEPHRINE 10; 10 MG/ML; UG/ML
20 INJECTION, SOLUTION INFILTRATION; PERINEURAL ONCE
Status: DISCONTINUED | OUTPATIENT
Start: 2022-04-06 | End: 2022-04-06 | Stop reason: HOSPADM

## 2022-04-06 RX ORDER — HYDROCODONE BITARTRATE AND ACETAMINOPHEN 5; 325 MG/1; MG/1
1 TABLET ORAL EVERY 6 HOURS PRN
Qty: 5 TABLET | Refills: 0 | Status: SHIPPED | OUTPATIENT
Start: 2022-04-06 | End: 2022-04-11

## 2022-04-06 RX ORDER — NAPROXEN SODIUM 220 MG
220 TABLET ORAL 2 TIMES DAILY WITH MEALS
Qty: 20 TABLET | Refills: 0 | Status: SHIPPED | OUTPATIENT
Start: 2022-04-06

## 2022-04-06 RX ADMIN — LIDOCAINE HYDROCHLORIDE,EPINEPHRINE BITARTRATE: 10; .01 INJECTION, SOLUTION INFILTRATION; PERINEURAL at 08:12

## 2022-04-06 NOTE — OP NOTE
OPERATIVE REPORT  PATIENT NAME: Blessing Smith  :  1970  MRN: [id number]  Pt Location: [facility]    SURGERY DATE: 22    Surgeon(s) and Role:     * Kylah Garcia MD - Primary     * Denice Wilson PA-C    Pre-Op Diagnosis:  Carpal tunnel syndrome on right [G56 01]    Post-Op Diagnosis:   Carpal tunnel syndrome on right [G56 01]    Procedure(s) (LRB):  Right endoscopic carpal tunnel release  (Right)    Specimen(s):  * No orders in the log *    Estimated Blood Loss:   Minimal      Anesthesia Type:   Local    Operative Indications: The patient has a history of right carpal tunnel syndrome that was recalcitrant to conservative management  The decision was made to bring the patient to the operating room for right endoscopic carpal tunnel release  Risks of the procedure were explained which include, but are not limited to bleeding; infection; damage to nerves, arteries,veins, tendons; scar; pain; need for reoperation; failure to give desired result; and risks of anaesthesia  All questions were answered to satisfaction and they were willing to proceed  Operative Findings:  Right carpal tunnel syndrome    Complications:   None    Procedure and Technique:  After the patient, site, and procedure were identified, the patient was brought into the operating room in a supine position  Local anaesthesia was adminstered in the preoperative holding area  A tourniquet was not used  The  right upper extremity was then prepped and drapped in a normal, sterile, orthopedic fashion  After reconfirmation of the patient, site, and surgical procedure, which was agreed upon by the entire surgical team, attention was turned to the right wrist   The sites of the proximal and distal incisions were marked    The kareem of the proximal incision was placed horizontally at the midline of the wrist   The distal incision kareem was longitudinal extending distally from the point of intersection of the line between the long finger and ring finger and the line along the distal border of the fully abducted thumb  The proximal incision was performed  Subcutaneous tissues were dissected  Then the transverse volar antebrachial fascia was perforated with a scalpel  The edges of the skin incision where retracted and the forearm fascia was incised for approximately 1 5 cm proximally with care taken to identify and protect the median nerve  Retractors were used to inspect the transverse carpal ligament distally  A curved Reardon dissector was used to glide under the transverse carpal ligament and superficial to the median nerve with confirmation via the washboard feeling  Then the curved Reardon was pushed into the palm toward the distal incision site  When the location of the distal skin kareem was adequate, the distal incision was made  Then with retraction of the skin, further dissection and perforation of the palmar fascia was performed with the use of tenotomy scissors  The curved Reardon was guided from proximal to distal out the distal incisions without any twisting to allow for dilation of the tract  The curved Reardon was removed, and the cannula for the camera was inserted along the same tract, making sure to keep the alignment post on the cannula perpendicular to the plane of the hand without twisting  Then while keeping the wrist in extension, and holding the cannula of the camera in place, the wrist was placed on the hyperextension board  The scope was inserted distally, and a cotton-tip applicator was used proximally to clean the tract as well as the scope  A curved cutting knife was introduced from proximal to distal while keeping visualization with the use of the camera  Without twisting of the canula, the knife was used to cut the transverse carpal ligament completely, making sure there were no remnant fibers  Then after this was accomplished, the hand was removed from the extension block    Three maneuvers were used to confirm the full release of the transverse carpal ligament  First, the ease of twisting the trocar of the camera confirmed the release of the ligament  Second, the curved Reardon was introduced to make sure there were no remnant fibers that could be felt palmarly  Third, the scope was introduced again to visualize that the whole ligament was released proximally to distally  Additional confirmation of full release included retraction and inspection in the distal and proximal incisions to make sure there were no remnant fibers distally or proximally respectively  At the completion of the procedure, hemostasis was obtained with cautery and direct pressure  The wounds were copiously irrigated with sterile solution  The wounds were closed with Prolene  Sterile dressings were applied, including Xeroform, gauze, tweeners, webril, ACE  Please note, all sponge, needle, and instrument counts were correct prior to closure  Loupe magnification was utilized  The patient tolerated the procedure well       I was present for all critical portions of the procedure, A qualified resident physician was not available and A physician assistant was required during the procedure for retraction tissue handling,dissection and suturing    Patient Disposition:  APU and hemodynamically stable    SIGNATURE: Therese Thorpe MD  DATE: 04/06/22  TIME: 8:37 AM

## 2022-04-06 NOTE — LETTER
[id number]  [address] [address]  Dept: [phone]    April 7, 2022     Patient: Paige Lazcano   YOB: 1970   Date of Visit: April 6, 2022       To Whom it May Concern:    Paige Lazcano is under my professional care  He had surgery on 4/6/2022  Please excuse his wife, Solomon Hu, from work on 4/7/2022 as she needed to stay home to take care of her        If you have any questions or concerns, please don't hesitate to call           Sincerely,          Marita Casanova MD

## 2022-04-06 NOTE — H&P
H&P Exam - Orthopedics   Berna Mendoza 47 y o  male MRN: [id number]  Unit/Bed#: [facility] 09    Assessment/Plan   Assessment:  Right carpal tunnel syndrome    Plan:  Right endoscopic carpal tunnel release    History of Present Illness   HPI:  Berna Mendoza is a 47 y o  male who presents with numbness and tinging in the right hand  Patient has not responded to non-operative modalities  Patient would like to proceed with surgery today      Historical Information  Review Of Systems:   · Skin: Normal  · Neuro: See HPI  · Musculoskeletal: See HPI  · 14 point review of systems negative except as stated above     Past Medical History:   Past Medical History:   Diagnosis Date    Chronic back pain     Fibromyalgia, primary     Hypertension     RA (rheumatoid arthritis) ([facility] [address] )        Past Surgical History:   Past Surgical History:   Procedure Laterality Date    CARPAL TUNNEL RELEASE Left     LATERAL EPICONDYLE RELEASE Right 2/25/2016    Procedure: RELEASE EPICONDYLAR ELBOW, LATERAL;  Surgeon: Angel Gentile MD;  [facility];  Service:     GA REPAIR ACHILLES TENDON,PRIMARY Left 4/2/2018    Procedure: REPAIR TENDON ACHILLES;  Surgeon: David Childress MD;  [facility];  Service: Orthopedics    GA TENOTOMY ELBOW LATERAL/MEDIAL DEBRIDE OPEN Left 2/12/2019    Procedure: RELEASE EPICONDYLAR ELBOW;  Surgeon: Angel Gentile MD;  [facility];  Service: Orthopedics    ROTATOR CUFF REPAIR Left     ULNAR TUNNEL RELEASE Left     x2       Family History:  Family history reviewed and non-contributory  Family History   Problem Relation Age of Onset    Hypertension Mother     Diabetes Mother        Social History:  Social History     Socioeconomic History    Marital status: Single     Spouse name: None    Number of children: None    Years of education: None    Highest education level: None   Occupational History    None   Tobacco Use    Smoking status: Never Smoker    Smokeless tobacco: Never Used Vaping Use    Vaping Use: Never used   Substance and Sexual Activity    Alcohol use: No    Drug use: No    Sexual activity: Yes   Other Topics Concern    None   Social History Narrative    None     Social Determinants of Health     Financial Resource Strain: Not on file   Food Insecurity: Not on file   Transportation Needs: Not on file   Physical Activity: Not on file   Stress: Not on file   Social Connections: Not on file   Intimate Partner Violence: Not on file   Housing Stability: Not on file       Allergies: Allergies   Allergen Reactions    Lisinopril      Other reaction(s): Other (See Comments)  Cough and rash         Reglan [Metoclopramide] Other (See Comments)     Restless; akisthesia             Labs:  0   Lab Value Date/Time    HCT 45 4 03/17/2022 1544    HCT 48 8 03/06/2021 0213    HCT 48 0 02/09/2018 2239    HCT 47 8 10/27/2015 1646    HCT 47 5 03/25/2014 1033    HGB 15 7 03/17/2022 1544    HGB 16 3 03/06/2021 0213    HGB 17 1 (H) 02/09/2018 2239    HGB 17 0 10/27/2015 1646    HGB 16 3 03/25/2014 1033    INR 1 04 04/02/2018 0745    INR 0 99 10/27/2015 1646    WBC 7 44 03/17/2022 1544    WBC 9 99 03/06/2021 0213    WBC 5 33 02/09/2018 2239    WBC 10 38 (H) 10/27/2015 1646    WBC 12 30 (H) 03/25/2014 1033       Meds:    Current Facility-Administered Medications:     lidocaine-epinephrine (XYLOCAINE/EPINEPHRINE) 1 %-1:100,000 20 mL, sodium bicarbonate 2 mEq infiltration, , Infiltration, Once, Marcia Nazario MD    lidocaine-epinephrine (XYLOCAINE/EPINEPHRINE) 1 %-1:100,000 injection 20 mL, 20 mL, Infiltration, Once, Marcia Nazario MD    Blood Culture:   No results found for: BLOODCX    Wound Culture:   No results found for: WOUNDCULT    Ins and Outs:  No intake/output data recorded              Physical Exam  /90   Pulse 72   Temp (!) 96 7 °F (35 9 °C) (Tympanic)   Resp 17   Ht 5' 7" (1 702 m)   Wt 86 2 kg (190 lb)   SpO2 98%   BMI 29 76 kg/m²   /90   Pulse 72   Temp Merline Rick ) 96 7 °F (35 9 °C) (Tympanic)   Resp 17   Ht 5' 7" (1 702 m)   Wt 86 2 kg (190 lb)   SpO2 98%   BMI 29 76 kg/m²   Gen: Alert and oriented to person, place, time  HEENT: EOMI, eyes clear, moist mucus membranes, hearing intact  Respiratory: Bilateral chest rise   No audible wheezing found  Cardiovascular: Regular Rate and Rhythm  Abdomen: soft nontender/nondistended  Ortho Exam: positive tinels carpal tunnel  Neuro Exam: weakness APB    Lab Results: Reviewed  Imaging: Reviewed

## 2022-04-07 ENCOUNTER — TELEPHONE (OUTPATIENT)
Dept: OBGYN CLINIC | Facility: HOSPITAL | Age: 52
End: 2022-04-07

## 2022-04-07 NOTE — TELEPHONE ENCOUNTER
DR Linh Chong [phone]    Patient called requesting letter to allow his wife, Traci Person , to be home today to help him post-op surgery yesterday  Patient's wife will  letter in the office   Patient will wait for phone call from office when letter is completed

## 2022-04-18 ENCOUNTER — OFFICE VISIT (OUTPATIENT)
Dept: OBGYN CLINIC | Facility: HOSPITAL | Age: 52
End: 2022-04-18
Payer: MEDICARE

## 2022-04-18 VITALS
DIASTOLIC BLOOD PRESSURE: 71 MMHG | HEIGHT: 67 IN | SYSTOLIC BLOOD PRESSURE: 142 MMHG | BODY MASS INDEX: 29.82 KG/M2 | HEART RATE: 68 BPM | WEIGHT: 190 LBS

## 2022-04-18 DIAGNOSIS — G56.01 CARPAL TUNNEL SYNDROME ON RIGHT: Primary | ICD-10-CM

## 2022-04-18 DIAGNOSIS — M65.332 TRIGGER MIDDLE FINGER OF LEFT HAND: ICD-10-CM

## 2022-04-18 PROCEDURE — 99024 POSTOP FOLLOW-UP VISIT: CPT | Performed by: PHYSICIAN ASSISTANT

## 2022-04-18 PROCEDURE — 20550 NJX 1 TENDON SHEATH/LIGAMENT: CPT | Performed by: PHYSICIAN ASSISTANT

## 2022-04-18 RX ORDER — LIDOCAINE HYDROCHLORIDE 10 MG/ML
0.5 INJECTION, SOLUTION INFILTRATION; PERINEURAL
Status: COMPLETED | OUTPATIENT
Start: 2022-04-18 | End: 2022-04-18

## 2022-04-18 RX ORDER — TRIAMCINOLONE ACETONIDE 40 MG/ML
20 INJECTION, SUSPENSION INTRA-ARTICULAR; INTRAMUSCULAR
Status: COMPLETED | OUTPATIENT
Start: 2022-04-18 | End: 2022-04-18

## 2022-04-18 RX ADMIN — LIDOCAINE HYDROCHLORIDE 0.5 ML: 10 INJECTION, SOLUTION INFILTRATION; PERINEURAL at 11:14

## 2022-04-18 RX ADMIN — TRIAMCINOLONE ACETONIDE 20 MG: 40 INJECTION, SUSPENSION INTRA-ARTICULAR; INTRAMUSCULAR at 11:14

## 2022-04-18 NOTE — PROGRESS NOTES
SUBJECTIVE:  Twyla Radford is a 47y o  year old male who presents for follow up after surgery, right endoscopic carpal tunnel release performed on  4/6/2022  Today patient has no nighttime symptoms, he states that his numbness and tingling is improving  He is also complaining of left long finger and ring finger pain  He states that the pain is over the palmar aspect of the hand  He will note clicking and locking or of the long finger compared to the ring finger  VITALS:  Vitals:    04/18/22 1043   BP: 142/71   Pulse: 68       PHYSICAL EXAMINATION:  General: well developed and well nourished, alert, oriented times 3 and appears comfortable  Psychiatric: Normal    MUSCULOSKELETAL EXAMINATION:  Right wrist  Incision: Clean, dry, with sutures intact, sutures were removed without complication  Range of Motion:  Normal postoperative range of motion  Neurovascular status: Neuro intact, good cap refill     left long and ring finger:  Positive palpable nodule over the A1 pulley  Positive tenderness to palpation over A1 pulley  Positive clicking  Negative catching  STUDIES REVIEWED:  No studies reviewed  PROCEDURES PERFORMED:  Hand/upper extremity injection: L long A1  Universal Protocol:  Consent: Verbal consent obtained  Risks and benefits: risks, benefits and alternatives were discussed  Consent given by: patient  Time out: Immediately prior to procedure a "time out" was called to verify the correct patient, procedure, equipment, support staff and site/side marked as required    Patient understanding: patient states understanding of the procedure being performed  Patient consent: the patient's understanding of the procedure matches consent given  Site marked: the operative site was marked  Patient identity confirmed: verbally with patient    Supporting Documentation  Indications: pain   Procedure Details  Condition:trigger finger Location: long finger - L long A1   Preparation: Patient was prepped and draped in the usual sterile fashion  Needle size: 25 G  Approach: volar  Medications administered: 0 5 mL lidocaine 1 %; 20 mg triamcinolone acetonide 40 mg/mL    Patient tolerance: patient tolerated the procedure well with no immediate complications  Dressing:  Sterile dressing applied                ASSESSMENT/PLAN:    S/P right endoscopic carpal tunnel release  Done today: Sutures out  · Patient was advised that he can have tenderness over the palmar aspect of his hand for 3-4 months after surgery which is normal   · He was advised to use heat as well as massage as demonstrated in the office  · He was advised to avoid soaking for another week and then may start to use lotion over the scars for scar tissue mobilization  · He will follow up as needed for this issue    Left long and ring trigger finger  · Patient was given a cortisone injection today for left long trigger finger  He tolerated well  · He is advised to use heat as well as massage as demonstrated in the office   · He can take Tylenol and anti-inflammatories as needed for pain  · He will follow up in 8 weeks for re-evaluation, if he has continued clicking and locking of the ring finger, we can consider cortisone injection at that time however symptoms were not significant today  FOLLOW UP:  Return in about 8 weeks (around 6/13/2022) for with Dr KATYA PARNELL   Work/school status:  No restrictions      TO DO AT NEXT VISIT:  Re-evaluation of current issue           Portions of the record may have been created with voice recognition software  Occasional wrong word or "sound a like" substitutions may have occurred due to the inherent limitations of voice recognition software  Read the chart carefully and recognize, using context, where substitutions have occurred

## 2022-08-16 ENCOUNTER — OFFICE VISIT (OUTPATIENT)
Dept: DENTISTRY | Facility: CLINIC | Age: 52
End: 2022-08-16

## 2022-08-16 DIAGNOSIS — Z01.20 VISIT FOR DENTAL EXAMINATION: Primary | ICD-10-CM
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
## 2022-08-16 PROBLEM — M47.817 SPONDYLOSIS OF LUMBOSACRAL JOINT WITHOUT MYELOPATHY: Status: ACTIVE | Noted: 2017-12-15

## 2022-08-16 PROBLEM — M50.30 DDD (DEGENERATIVE DISC DISEASE), CERVICAL: Status: ACTIVE | Noted: 2022-08-16

## 2022-08-16 PROBLEM — N52.9 ERECTILE DYSFUNCTION: Status: ACTIVE | Noted: 2017-06-16

## 2022-08-16 PROBLEM — I82.409 DEEP VENOUS THROMBOSIS (HCC): Status: ACTIVE | Noted: 2018-04-11

## 2022-08-16 PROBLEM — M48.02 CERVICAL SPINAL STENOSIS: Status: ACTIVE | Noted: 2017-06-16

## 2022-08-16 PROBLEM — M47.816 FACET ARTHROPATHY, LUMBAR: Status: ACTIVE | Noted: 2017-10-17

## 2022-08-16 PROBLEM — G43.719 INTRACTABLE CHRONIC MIGRAINE WITHOUT AURA AND WITHOUT STATUS MIGRAINOSUS: Status: ACTIVE | Noted: 2017-06-15

## 2022-08-16 PROBLEM — R39.9 LOWER URINARY TRACT SYMPTOMS (LUTS): Status: ACTIVE | Noted: 2020-02-20

## 2022-08-16 PROBLEM — E03.9 HYPOTHYROIDISM: Status: ACTIVE | Noted: 2019-07-15

## 2022-08-16 PROBLEM — E78.5 HYPERLIPIDEMIA: Status: ACTIVE | Noted: 2019-07-15

## 2022-08-16 PROCEDURE — D0210 INTRAORAL - COMPLETE SERIES OF RADIOGRAPHIC IMAGES: HCPCS

## 2022-08-16 PROCEDURE — D0191 ASSESSMENT OF A PATIENT: HCPCS

## 2022-08-16 NOTE — PROGRESS NOTES
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 between #27/28  Evaluated by Dr Marcio Bunch as well could be perio related as well, pus present when pressed on overgrowth  Recommended seeing Dr Rachael Bright for gingival overgrowth  Recommended antibiotics  Dr Cherelle Mata took IO pics for documentation  Recommended a biopsy for the gum tissue at the debridement appt  Pt dismissed in good health, no complications and all questions answered  Tyrone JOHN Love/ Dr Miriam Fam/ Dr Alondra Casillas    NV: full mouth debridement and biopsy gum tissue between #27/28 with Dr Rachael Bright- 75 mins must be scheduled with a resident    NV2: comp exam and perio charting

## 2022-09-01 ENCOUNTER — HOSPITAL ENCOUNTER (EMERGENCY)
Facility: HOSPITAL | Age: 52
Discharge: HOME | End: 2022-09-01
Attending: EMERGENCY MEDICINE
Payer: MEDICARE

## 2022-09-01 ENCOUNTER — OFFICE VISIT (OUTPATIENT)
Dept: OBGYN CLINIC | Facility: OTHER | Age: 52
End: 2022-09-01
Payer: MEDICARE

## 2022-09-01 ENCOUNTER — APPOINTMENT (OUTPATIENT)
Dept: RADIOLOGY | Facility: OTHER | Age: 52
End: 2022-09-01
Payer: MEDICARE

## 2022-09-01 VITALS
DIASTOLIC BLOOD PRESSURE: 129 MMHG | HEIGHT: 67 IN | SYSTOLIC BLOOD PRESSURE: 192 MMHG | HEART RATE: 65 BPM | BODY MASS INDEX: 30.76 KG/M2 | WEIGHT: 196 LBS

## 2022-09-01 VITALS
RESPIRATION RATE: 20 BRPM | HEART RATE: 58 BPM | SYSTOLIC BLOOD PRESSURE: 200 MMHG | DIASTOLIC BLOOD PRESSURE: 108 MMHG | TEMPERATURE: 98 F | OXYGEN SATURATION: 94 %

## 2022-09-01 DIAGNOSIS — M25.561 ACUTE PAIN OF BOTH KNEES: Primary | ICD-10-CM

## 2022-09-01 DIAGNOSIS — I10 PRIMARY HYPERTENSION: Primary | ICD-10-CM

## 2022-09-01 DIAGNOSIS — M67.88 ACHILLES TENDINOSIS OF LEFT LOWER EXTREMITY: ICD-10-CM

## 2022-09-01 DIAGNOSIS — M17.0 BILATERAL PRIMARY OSTEOARTHRITIS OF KNEE: ICD-10-CM

## 2022-09-01 DIAGNOSIS — M25.562 ACUTE PAIN OF BOTH KNEES: ICD-10-CM

## 2022-09-01 DIAGNOSIS — M25.561 ACUTE PAIN OF BOTH KNEES: ICD-10-CM

## 2022-09-01 DIAGNOSIS — I10 ELEVATED BLOOD PRESSURE READING IN OFFICE WITH DIAGNOSIS OF HYPERTENSION: ICD-10-CM

## 2022-09-01 DIAGNOSIS — M25.562 ACUTE PAIN OF BOTH KNEES: Primary | ICD-10-CM

## 2022-09-01 LAB
BACTERIA UR QL AUTO: NORMAL /HPF
BILIRUB UR QL STRIP: NEGATIVE
CLARITY UR: CLEAR
COLOR UR: YELLOW
GLUCOSE UR STRIP-MCNC: NEGATIVE MG/DL
HGB UR QL STRIP.AUTO: ABNORMAL
KETONES UR STRIP-MCNC: NEGATIVE MG/DL
LEUKOCYTE ESTERASE UR QL STRIP: NEGATIVE
NITRITE UR QL STRIP: NEGATIVE
NON-SQ EPI CELLS URNS QL MICRO: NORMAL /HPF
PH UR STRIP.AUTO: 7 [PH] (ref 4.5–8)
PROT UR STRIP-MCNC: NEGATIVE MG/DL
RBC #/AREA URNS AUTO: NORMAL /HPF
SP GR UR STRIP.AUTO: 1.02 (ref 1–1.03)
UROBILINOGEN UR QL STRIP.AUTO: 0.2 E.U./DL
WBC #/AREA URNS AUTO: NORMAL /HPF

## 2022-09-01 PROCEDURE — 73564 X-RAY EXAM KNEE 4 OR MORE: CPT

## 2022-09-01 PROCEDURE — 93005 ELECTROCARDIOGRAM TRACING: CPT

## 2022-09-01 PROCEDURE — 81001 URINALYSIS AUTO W/SCOPE: CPT

## 2022-09-01 PROCEDURE — 99284 EMERGENCY DEPT VISIT MOD MDM: CPT

## 2022-09-01 PROCEDURE — 99285 EMERGENCY DEPT VISIT HI MDM: CPT | Performed by: EMERGENCY MEDICINE

## 2022-09-01 PROCEDURE — 99215 OFFICE O/P EST HI 40 MIN: CPT | Performed by: FAMILY MEDICINE

## 2022-09-01 RX ORDER — LEVOTHYROXINE SODIUM 0.05 MG/1
50 TABLET ORAL DAILY
COMMUNITY
Start: 2022-08-03

## 2022-09-01 RX ORDER — AMLODIPINE BESYLATE 5 MG/1
10 TABLET ORAL ONCE
Status: COMPLETED | OUTPATIENT
Start: 2022-09-01 | End: 2022-09-01

## 2022-09-01 RX ORDER — METOPROLOL SUCCINATE 50 MG/1
50 TABLET, EXTENDED RELEASE ORAL DAILY
Status: DISCONTINUED | OUTPATIENT
Start: 2022-09-01 | End: 2022-09-01 | Stop reason: HOSPADM

## 2022-09-01 RX ORDER — LOSARTAN POTASSIUM 50 MG/1
100 TABLET ORAL ONCE
Status: COMPLETED | OUTPATIENT
Start: 2022-09-01 | End: 2022-09-01

## 2022-09-01 RX ADMIN — LOSARTAN POTASSIUM 100 MG: 50 TABLET, FILM COATED ORAL at 12:40

## 2022-09-01 RX ADMIN — METOPROLOL SUCCINATE 50 MG: 50 TABLET, EXTENDED RELEASE ORAL at 12:41

## 2022-09-01 RX ADMIN — AMLODIPINE BESYLATE 10 MG: 5 TABLET ORAL at 12:40

## 2022-09-01 NOTE — PROGRESS NOTES
1  Acute pain of both knees  XR knee 4+ vw left injury    XR knee 4+ vw right injury   2  Elevated blood pressure reading in office with diagnosis of hypertension     3  Bilateral primary osteoarthritis of knee     4  Achilles tendinosis of left lower extremity  [facility]     Orders Placed This Encounter   Procedures    XR knee 4+ vw left injury    XR knee 4+ vw right injury    [facility]        IMAGING STUDIES: (I personally reviewed images in PACS and report):     X-ray bilateral knees 09/01/2022: Moderate medial compartment osteoarthritis    PAST REPORTS:         ASSESSMENT/PLAN:  Uncontrolled HTN Urgency-sent to ER   Bilateral Knee Osteoarthritis Medial  Patellofemoral pain syndrome with no significant joint space narrowing  Knee Pain L>R  Chronic Left Achilles Tendinosis s/p surgical repair within past 3-5 years  PMH: fibromyalgia, HTN    BP Physician check in office 198-220/105-120 today  EMS Check 250/136    Repeat X-ray next visit: None    Return for Follow-up after ER visit  Patient Instructions   Recommend follow-up for corticosteroid injections once blood pressure controlled    Recommended physical therapy for chronic left Achilles tendinosis status post repair    Explained the patient has severely elevated blood pressure today  I reviewed risks of elevated blood pressure including heart attack and stroke  I recommended go to ER visit today via ambulance  Patient agreed  I recommended against taking any anti-inflammatory medications in the future due to risk of elevated blood pressure stroke heart attack and death  I recommend against taking anti-inflammatory medications also known NSAIDs  (non-steroidal anti-inflammatory pills including advil, ibuprofen, motrin, meloxicam, celecoxib, aleve, naproxen, and aspirin)   These medications should not be used in the setting of unctonrolled high blood pressure, kidney disease, stomach ulcers, gastrointestinal bleeding from the rectum or the stomach, or simultaneously with blood thinners  Risks of taking NSAIDs include severely elevated blood pressure that can lead to stroke and heart attack, kidney failure, and severe internal bleeding  If you have no liver problems, you may take Tylenol (also known as acetaminophen) at a  maximum of 1,000  Mg per dose every 6 hours as needed for pain but no more 3 doses or 3,000 mg per day  If you are taking other medications which include tylenol (acetaminophen) such as hydrocodone-acetaminophen then you must factor in the amount of tylenol (acetamionphen) into your 3,000mg maximum dose  Patient expressed understanding and agreed to plan              __________________________________________________________________________    HISTORY OF PRESENT ILLNESS:  Evaluation of bilateral knee pain left greater than right ongoing for over 2 years  Patient describes achy pressure stiffness in the knees worse with climbing stairs and squatting  The past he has received viscosupplementation injections well as corticosteroid  Points to the anterior aspect of the knee as source of pain  Patient today also has elevated blood pressure  Tells me is chronic hypertension takes 3 medications and did not take any of his medications today  He has a history of headache but denies any headache today  Denies any chest pain trouble breathing vision changes feeling like he may faint  Patient also continues have left ankle pain  His past medical history significant for Achilles tear surgically fixed approximately 3-5 years ago  Points to the posterior aspect of the Achilles mid substance as source of pain  States has been chronic since surgery            Review of Systems      Following history reviewed and update:    Past Medical History:   Diagnosis Date    Chronic back pain     Fibromyalgia, primary     Hypertension     RA (rheumatoid arthritis) ([facility] [address] )      Past Surgical History:   Procedure Laterality Date    CARPAL TUNNEL RELEASE Left     LATERAL EPICONDYLE RELEASE Right 2/25/2016    Procedure: RELEASE EPICONDYLAR ELBOW, LATERAL;  Surgeon: Loida Mcfarland MD;  [facility];  Service:     [place] REPAIR ACHILLES TENDON,PRIMARY Left 4/2/2018    Procedure: REPAIR TENDON ACHILLES;  Surgeon: Kathe Henriquez MD;  [facility];  Service: Orthopedics    UT TENOTOMY ELBOW LATERAL/MEDIAL DEBRIDE OPEN Left 2/12/2019    Procedure: RELEASE EPICONDYLAR ELBOW;  Surgeon: Loida Mcfarland MD;  [facility];  Service: Orthopedics    UT WRIST Steve Karen LIG Right 4/6/2022    Procedure: Right endoscopic carpal tunnel release ;  Surgeon: Loida Mcfarland MD;  [facility];  Service: Orthopedics    ROTATOR CUFF REPAIR Left     ULNAR TUNNEL RELEASE Left     x2     Social History   Social History     Substance and Sexual Activity   Alcohol Use No     Social History     Substance and Sexual Activity   Drug Use No     Social History     Tobacco Use   Smoking Status Never Smoker   Smokeless Tobacco Never Used     Family History   Problem Relation Age of Onset    Hypertension Mother     Diabetes Mother      Allergies   Allergen Reactions    Lisinopril      Other reaction(s):  Other (See Comments)  Cough and rash         Reglan [Metoclopramide] Other (See Comments)     Restless; akisthesia            Physical Exam  BP (!) 192/129 (BP Location: Left arm, Patient Position: Sitting, Cuff Size: Adult)   Pulse 65   Ht 5' 7" (1 702 m)   Wt 88 9 kg (196 lb)   BMI 30 70 kg/m²     Constitutional:  see vital signs  Gen: well-developed, normocephalic/atraumatic, well-groomed  Eyes: No inflammation or discharge of conjunctiva or lids; sclera clear   Pharynx: no inflammation, lesion, or mass of lips  Neck: supple, no masses, non-distended  MSK: no inflammation, lesion, mass, or clubbing of nails and digits except for other than mentioned below  SKIN: no visible rashes or skin lesions  Pulmonary/Chest: Effort normal  No respiratory distress     NEURO: cranial nerves grossly intact  PSYCH:  Alert and oriented to person, place, and time; recent and remote memory intact; mood normal, no depression, anxiety, or agitation, judgment and insight good and intact     Ortho Exam  LEFT KNEE:  Erythema: no  Swelling: no  Increased Warmth: no  Tenderness:+ medial joint line  Flexion: intact  Extension: intact  Patellar Displacement:  Patellar Tilt:  Patellar Apprehension: negative  Patellar Grind Loo's: +  Lachman's: negative  Drawer: negative  Varus laxity: negative  Valgus laxity: negative  Ken: negative       RIGHT KNEE:  Erythema: no  Swelling: no  Increased Warmth: no  Tenderness: none  Flexion: intact  Extension: intact  Lachman's: negative  Drawer: negative  Varus laxity: negative  Valgus laxity: negative  Ken: negative        LEFT ANKLE  EXAM  Observation  GAIT:  normal    Inspection  Erythema: no  Ecchymosis: no  Edema:  none    Tenderness  Proximal Fibula: no  (Maisonneuve frx)  AiTFL: no  (2cm proximal-medial to tip lateral malleolus 92% sens, 29% spec)  ATFL: no  CFL: no  PTFL: no  Achilles: + insertion point and midsubstance  Deltoid: No  Peroneal: no  Tibialis Anterior: no  Tibialis Posterior: no    Bony Tenderpoints:  Lateral Malleolus: no  Base of 5th MT: no  Medial Malleolus: no  Navicular: no  Talar dome: No    ROM  Dorsiflexion: intact  Plantarflexion: intact    Muscle Strength  Pronation: intact without pain  Supination: intact without pain    Calcaneal Squeeze: negative    LEFT ACHILLES EXAMINATION:  Simmonds Triad:  Palpable Gap or Defect of Achilles: none  Angle of Declination: angle of baseline plantarflexion symmetric to contralateral side  Matles Test (patient prone, intact and symmetric plantarflexion of ankle when flexing knee): intact  Ordonez's Calf Squeeze Test: intact obligatory plantarflexion        __________________________________________________________________________  Procedures

## 2022-09-01 NOTE — ED PROVIDER NOTES
Emergency Department Note- Jaun Alexandre 47 y o  male MRN: [id number]    Unit/Bed#: [facility] Encounter: [id number]        History of Present Illness   HPI:  Jaun Alexandre is a 47 y o  male who presents with hypertension  Patient states he has chronic back pain and also acutely has been having worsening knee pain and went to get an injection in his knee and he was noted to have elevated blood pressures with systolics in the 747S  Patient does have a history of hypertension for which he is on 3 medications and states he did not take them this morning  Patient is completely asymptomatic at this point he states he has no headache blurry vision double vision no neck pain no numbness tingling weakness no chest pain no palpitations no shortness of breath no abdominal pain no nausea vomiting diarrhea no urinary symptoms  Patient states his blood pressures have been even higher than this before  Patient did take 2 Aleve this morning for his knee pain  REVIEW OF SYSTEMS    Constitutional: Negative for chills, fatigue and fever  HENT: Negative for ear pain, sore throat and trouble swallowing  Eyes: Negative for photophobia, pain and visual disturbance  Respiratory: Negative for cough, chest tightness and shortness of breath  Cardiovascular: Negative for chest pain and palpitations  Gastrointestinal: Negative for abdominal pain, constipation, diarrhea, nausea and vomiting  Genitourinary: Negative for dysuria, flank pain, frequency and hematuria  Musculoskeletal: Negative for back pain and neck pain  Skin: Negative for color change and rash  Neurological: Negative for dizziness, weakness, light-headedness and headaches  Psychiatric/Behavioral: Negative for confusion  The patient is not nervous/anxious      All systems reviewed and negative except as noted above or in HPI         Historical Information   Past Medical History:   Diagnosis Date    Chronic back pain     Fibromyalgia, primary     Hypertension     RA (rheumatoid arthritis) ([facility])      Past Surgical History:   Procedure Laterality Date    CARPAL TUNNEL RELEASE Left     LATERAL EPICONDYLE RELEASE Right 2/25/2016    Procedure: RELEASE EPICONDYLAR ELBOW, LATERAL;  Surgeon: Leopold Mitts, MD;  [facility];  Service:     [place] REPAIR ACHILLES TENDON,PRIMARY Left 4/2/2018    Procedure: REPAIR TENDON ACHILLES;  Surgeon: Esa Gallardo MD;  [facility];  Service: Orthopedics    [place] TENOTOMY ELBOW LATERAL/MEDIAL DEBRIDE OPEN Left 2/12/2019    Procedure: RELEASE EPICONDYLAR ELBOW;  Surgeon: Leopold Mitts, MD;  [facility];  Service: Orthopedics    [place] WRIST Kaylee Flair LIG Right 4/6/2022    Procedure: Right endoscopic carpal tunnel release ;  Surgeon: Leopold Mitts, MD;  [facility];  Service: Orthopedics    ROTATOR CUFF REPAIR Left     ULNAR TUNNEL RELEASE Left     x2     Social History   Social History     Substance and Sexual Activity   Alcohol Use No     Social History     Substance and Sexual Activity   Drug Use No     Social History     Tobacco Use   Smoking Status Never Smoker   Smokeless Tobacco Never Used     Family History:   Family History   Problem Relation Age of Onset    Hypertension Mother     Diabetes Mother        Meds/Allergies   (Not in a hospital admission)    Allergies   Allergen Reactions    Lisinopril      Other reaction(s): Other (See Comments)  Cough and rash         Reglan [Metoclopramide] Other (See Comments)     Restless; akisthesia         Objective   Vitals: Blood pressure (!) 200/108, pulse 58, temperature 98 °F (36 7 °C), temperature source Oral, resp  rate 20, SpO2 94 %      PHYSICAL EXAM     General Appearance: alert and oriented, nad, non toxic appearing  Skin:  Warm, dry, intact  HEENT: atraumatic, normocephalic, eomi, perll   Neck: Supple, no JVD, no lymphadenopathy, trachea midline, no bruit  Cardiac: rrr, no murmurs, rub, gallops  Pulmonary: lungs cta, no wheezes, rales, rhonchi  Gastrointestinal: abdomen soft nontender, good bs, no mass or bruits, no cva tenderness  Extremities: no pedal edema, good pulses, no calf tenderness, no clubbing, no cyanosis  Neuro:  no focal motor or sensory deficits, cn intact  Psych:  Normal mood and affect, normal judgement and insight      Lab Results: Lab Results: I have personally reviewed pertinent lab results  EKG, Pathology, and Other Studies: I have personally reviewed pertinent films in PACS    Assessment/Plan     ED Medical Decision Making:  Patient with asymptomatic hypertension likely secondary to not taking his meds this morning  Will obtain EKG urine dip and patient will be given his blood pressure medications  Patient has close follow-up with his PCP was not made any recent adjustments to his meds  ECG 12 Lead Documentation  Date/Time: today/date: 9/1/2022  Performed by: Babar Noonan  Authorized by: Babar Noonan     ECG reviewed by me, the ED Provider: yes    Patient location:  ED   Previous ECG:  Compared to current, no change   Rate:  ECG rate assessment: normal    Rhythm: sinus rhythm    Ectopy:  : none    QRS axis:  Normal  Intervals: normal   Q waves: None   ST segments:  Normal  T waves: normal      Impression: Normal EKG      Portions of the record may have been created with voice recognition software  Occasional wrong word or "sound a like" substitutions may have occurred due to the inherent limitations of voice recognition software  Read the chart carefully and recognize, using context, where substitutions have occurred         Pacheco Man MD  09/01/22 7247

## 2022-09-01 NOTE — PATIENT INSTRUCTIONS
Recommend follow-up for corticosteroid injections once blood pressure controlled    Recommended physical therapy for chronic left Achilles tendinosis status post repair    Explained the patient has severely elevated blood pressure today  I reviewed risks of elevated blood pressure including heart attack and stroke  I recommended go to ER visit today via ambulance  Patient agreed  I recommended against taking any anti-inflammatory medications in the future due to risk of elevated blood pressure stroke heart attack and death  I recommend against taking anti-inflammatory medications also known NSAIDs  (non-steroidal anti-inflammatory pills including advil, ibuprofen, motrin, meloxicam, celecoxib, aleve, naproxen, and aspirin)  These medications should not be used in the setting of un[place]rolled high blood pressure, kidney disease, stomach ulcers, gastrointestinal bleeding from the rectum or the stomach, or simultaneously with blood thinners  Risks of taking NSAIDs include severely elevated blood pressure that can lead to stroke and heart attack, kidney failure, and severe internal bleeding  If you have no liver problems, you may take Tylenol (also known as acetaminophen) at a  maximum of 1,000  Mg per dose every 6 hours as needed for pain but no more 3 doses or 3,000 mg per day  If you are taking other medications which include tylenol (acetaminophen) such as hydrocodone-acetaminophen then you must factor in the amount of tylenol (acetamionphen) into your 3,000mg maximum dose  Patient expressed understanding and agreed to plan

## 2022-09-02 LAB
ATRIAL RATE: 51 BPM
P AXIS: 59 DEGREES
PR INTERVAL: 152 MS
QRS AXIS: 36 DEGREES
QRSD INTERVAL: 82 MS
QT INTERVAL: 422 MS
QTC INTERVAL: 388 MS
T WAVE AXIS: 26 DEGREES
VENTRICULAR RATE: 51 BPM

## 2022-09-02 PROCEDURE — 93010 ELECTROCARDIOGRAM REPORT: CPT | Performed by: INTERNAL MEDICINE

## 2022-09-08 ENCOUNTER — OFFICE VISIT (OUTPATIENT)
Dept: OBGYN CLINIC | Facility: HOSPITAL | Age: 52
End: 2022-09-08
Payer: MEDICARE

## 2022-09-08 VITALS
WEIGHT: 196 LBS | SYSTOLIC BLOOD PRESSURE: 168 MMHG | DIASTOLIC BLOOD PRESSURE: 116 MMHG | HEART RATE: 64 BPM | TEMPERATURE: 98.6 F | BODY MASS INDEX: 30.76 KG/M2 | HEIGHT: 67 IN

## 2022-09-08 DIAGNOSIS — M22.2X2 PATELLOFEMORAL DISORDER OF LEFT KNEE: ICD-10-CM

## 2022-09-08 DIAGNOSIS — M25.562 CHRONIC PAIN OF LEFT KNEE: Primary | ICD-10-CM

## 2022-09-08 DIAGNOSIS — M22.2X1 PATELLOFEMORAL DISORDER OF RIGHT KNEE: ICD-10-CM

## 2022-09-08 DIAGNOSIS — G89.29 CHRONIC PAIN OF LEFT KNEE: Primary | ICD-10-CM

## 2022-09-08 PROCEDURE — 99212 OFFICE O/P EST SF 10 MIN: CPT | Performed by: PHYSICIAN ASSISTANT

## 2022-09-08 NOTE — PROGRESS NOTES
Assessment:    Chronic bilateral knee pain   Bilateral knee osteoarthritis       Plan:    Due to the patient's consistently significantly high blood pressure, it is recommended to avoid steroid injection that time until it is better controlled  He is currently asymptomatic  He states he spoke with his primary care team yesterday and has follow-up in the coming days to discuss blood pressure management  Patient has had gel injections in the past is interesting in restarting this  Will place order for Euflexxa series 3/3 with Dr Josue Villareal as he has seen him in the past for this  Problem List Items Addressed This Visit        Musculoskeletal and Integument    Patellofemoral disorder of left knee    Patellofemoral disorder of right knee       Other    Chronic pain of left knee - Primary                   Subjective:     HPI    Patient ID:  Kaylynn Bond is a 47 y o  male presenting for evaluation of the bilateral knees  He had an appointment last week with Dr Nate Adams for bilateral knee pain possible injections however his blood pressure was elevated he was sent to the ER for evaluation  He presents today for steroid injections  He denies any new pain  He has chronic dull generalized knee pain that is worse with weight-bearing and standing for long periods of time  He has no recent injury to the knees  He denies any headaches or dizziness        The following portions of the patient's history were reviewed and updated as appropriate: allergies, current medications, past family history, past medical history, past social history, past surgical history and problem list     Review of Systems     Objective:    Imaging:  Right knee x-rays 9/1/2022    VIEWS:  XR KNEE 4+ VW RIGHT INJURY         FINDINGS:     There is no acute fracture or dislocation      There is no joint effusion      There is stable minimal medial compartment narrowing with tiny patellofemoral spurs      No lytic or blastic osseous lesion      Soft tissues are unremarkable      IMPRESSION:     Stable minimal osteoarthritis  Left knee x-rays 9/1/2022  VIEWS:  XR KNEE 4+ VW LEFT INJURY         FINDINGS:     There is no acute fracture or dislocation      There is no joint effusion      Again seen is minimal osteoarthritis with slight medial narrowing and tiny patellofemoral osteophyte      No lytic or blastic osseous lesion      Soft tissues are unremarkable      IMPRESSION:     Stable minimal osteoarthritis      Physical Exam     Vitals:    09/08/22 0913   BP: (!) 168/116   Pulse:        Orthopedic Examination:  Patient ambulates without assistance  Bilateral knees

## 2022-09-16 ENCOUNTER — OFFICE VISIT (OUTPATIENT)
Dept: OBGYN CLINIC | Facility: CLINIC | Age: 52
End: 2022-09-16
Payer: MEDICARE

## 2022-09-16 ENCOUNTER — APPOINTMENT (OUTPATIENT)
Dept: RADIOLOGY | Facility: AMBULARY SURGERY CENTER | Age: 52
End: 2022-09-16
Attending: STUDENT IN AN ORGANIZED HEALTH CARE EDUCATION/TRAINING PROGRAM
Payer: MEDICARE

## 2022-09-16 VITALS
HEART RATE: 83 BPM | HEIGHT: 67 IN | SYSTOLIC BLOOD PRESSURE: 163 MMHG | DIASTOLIC BLOOD PRESSURE: 109 MMHG | BODY MASS INDEX: 30.76 KG/M2 | WEIGHT: 196 LBS

## 2022-09-16 DIAGNOSIS — M25.571 PAIN, JOINT, ANKLE AND FOOT, RIGHT: ICD-10-CM

## 2022-09-16 DIAGNOSIS — M25.571 PAIN, JOINT, ANKLE AND FOOT, RIGHT: Primary | ICD-10-CM

## 2022-09-16 DIAGNOSIS — M76.61 TENDONITIS, ACHILLES, RIGHT: ICD-10-CM

## 2022-09-16 PROCEDURE — 99214 OFFICE O/P EST MOD 30 MIN: CPT | Performed by: STUDENT IN AN ORGANIZED HEALTH CARE EDUCATION/TRAINING PROGRAM

## 2022-09-16 PROCEDURE — 73610 X-RAY EXAM OF ANKLE: CPT

## 2022-09-16 NOTE — PROGRESS NOTES
Ortho Sports Medicine New Patient Ankle Visit    Assesment:  47 y o  male right ankle Achilles tendinitis    Plan:  The patient's diagnosis and treatment were discussed at length today  We discussed no treatment, non-operative treatment, and operative treatment  Ambulatory referral to PT for eccentric strengthening of Achilles and plantar fascia  Advised over the counter anti-inflammatory medications  Follow up p r n       Conservative treatment:    Ice to knee for 20 minutes at least 1-2 times daily  PT for ROM/strengthening to knee, hip and core  OTC NSAIDS prn for pain  Tylenol for pain  Let pain guide gradual return activities  Imaging: All imaging from today was reviewed by myself and explained to the patient  Injection:    No Injection planned at this time  Surgery:     No surgery is recommended at this point, continue with conservative treatment plan as noted  Follow up:    No follow-ups on file  Chief Complaint   Patient presents with    Right Ankle - Pain       History of Present Illness: The patient is a 47 y o  male presents for evaluation of right ankle pain  He states the pain is along the posterior aspect of the Achilles tendon region into the gastroc  He states pain is constant worse with activities  There has been intermittent and increasing in severity over the last several months  He has a history of prior contralateral left Achilles tendon rupture with subsequent repair with Dr Chance Reyes  He presents seeking diagnosis treatment recommendations open to avoid any future Achilles rupture on this right side  He has been taking anti-inflammatory medications over-the-counter  He has not had any formal PT    He is wearing supportive shoes and avoiding aggravating activities involve repetitive running and jumping      Ankle Surgical History:  None      Past Medical, Social and Family History:  Past Medical History:   Diagnosis Date    Chronic back pain     Fibromyalgia, primary     Hypertension     RA (rheumatoid arthritis) ([facility] [address] )      Past Surgical History:   Procedure Laterality Date    CARPAL TUNNEL RELEASE Left     LATERAL EPICONDYLE RELEASE Right 2/25/2016    Procedure: RELEASE EPICONDYLAR ELBOW, LATERAL;  Surgeon: Keven Escalante MD;  [facility];  Service:     UT REPAIR ACHILLES TENDON,PRIMARY Left 4/2/2018    Procedure: REPAIR TENDON ACHILLES;  Surgeon: Stephanie Russell MD;  [facility];  Service: Orthopedics    UT TENOTOMY ELBOW LATERAL/MEDIAL DEBRIDE OPEN Left 2/12/2019    Procedure: RELEASE EPICONDYLAR ELBOW;  Surgeon: Keven Escalante MD;  [facility];  Service: Orthopedics    UT WRIST [place] LIG Right 4/6/2022    Procedure: Right endoscopic carpal tunnel release ;  Surgeon: Keven Escalante MD;  [facility];  Service: Orthopedics    ROTATOR CUFF REPAIR Left     ULNAR TUNNEL RELEASE Left     x2     Allergies   Allergen Reactions    Lisinopril      Other reaction(s):  Other (See Comments)  Cough and rash         Reglan [Metoclopramide] Other (See Comments)     Restless; akisthesia       Current Outpatient Medications on File Prior to Visit   Medication Sig Dispense Refill    acetaminophen (TYLENOL) 650 mg CR tablet Take 1 tablet (650 mg total) by mouth every 8 (eight) hours as needed for mild pain 30 tablet 0    amLODIPine (NORVASC) 10 mg tablet Take 10 mg by mouth every evening        DULoxetine (CYMBALTA) 30 mg delayed release capsule Take 1 capsule (30 mg total) by mouth daily 30 capsule 2    DULoxetine (CYMBALTA) 60 mg delayed release capsule Take 1 capsule (60 mg total) by mouth daily 30 capsule 2    levothyroxine 25 mcg tablet Take 25 mcg by mouth daily    0    levothyroxine 50 mcg tablet Take 50 mcg by mouth daily      losartan (COZAAR) 100 MG tablet Take 100 mg by mouth daily      meloxicam (MOBIC) 15 mg tablet Take 15 mg by mouth      metoprolol succinate (TOPROL-XL) 50 mg 24 hr tablet "I take one a day bedtime"      naproxen sodium (ALEVE) 220 MG tablet Take 1 tablet (220 mg total) by mouth 2 (two) times a day with meals 20 tablet 0    oxybutynin (DITROPAN-XL) 5 mg 24 hr tablet Take 5 mg by mouth daily      tiZANidine (ZANAFLEX) 4 mg tablet Take 1 tablet (4 mg total) by mouth every 8 (eight) hours as needed for muscle spasms 90 tablet 0    traMADol (ULTRAM) 50 mg tablet Take 50 mg by mouth every 6 (six) hours as needed for moderate pain      triamcinolone (KENALOG) 0 1 % cream if needed        hydrochlorothiazide (HYDRODIURIL) 25 mg tablet Take 25 mg by mouth daily      pantoprazole (PROTONIX) 40 mg tablet Take 40 mg by mouth daily       No current facility-administered medications on file prior to visit  Social History     Socioeconomic History    Marital status: Single     Spouse name: Not on file    Number of children: Not on file    Years of education: Not on file    Highest education level: Not on file   Occupational History    Not on file   Tobacco Use    Smoking status: Never Smoker    Smokeless tobacco: Never Used   Vaping Use    Vaping Use: Never used   Substance and Sexual Activity    Alcohol use: No    Drug use: No    Sexual activity: Yes   Other Topics Concern    Not on file   Social History Narrative    Not on file     Social Determinants of Health     Financial Resource Strain: Not on file   Food Insecurity: Not on file   Transportation Needs: Not on file   Physical Activity: Not on file   Stress: Not on file   Social Connections: Not on file   Intimate Partner Violence: Not on file   Housing Stability: Not on file         I have reviewed the past medical, surgical, social and family history, medications and allergies as documented in the EMR  Review of systems: ROS is negative other than that noted in the HPI  Constitutional: Negative for fatigue and fever  HENT: Negative for sore throat      Respiratory: Negative for shortness of breath  Cardiovascular: Negative for chest pain  Gastrointestinal: Negative for abdominal pain  Endocrine: Negative for cold intolerance and heat intolerance  Genitourinary: Negative for flank pain  Musculoskeletal: Negative for back pain  Skin: Negative for rash  Allergic/Immunologic: Negative for immunocompromised state  Neurological: Negative for dizziness  Psychiatric/Behavioral: Negative for agitation  Physical Exam:    Blood pressure (!) 163/109, pulse 83, height 5' 7" (1 702 m), weight 88 9 kg (196 lb)  General/Constitutional: NAD, well developed, well nourished  HENT: Normocephalic, atraumatic  CV: Intact distal pulses, regular rate  Resp: No respiratory distress or labored breathing  Lymphatic: No lymphadenopathy palpated  Neuro: Alert and Oriented x 3, no focal deficits  Psych: Normal mood, normal affect, normal judgement, normal behavior  Skin: Warm, dry, no rashes, no erythema       Ankle Examination (focused): RIGHT LEFT   ROM:  Full painless Full and painless   Palpation:  Tender palpation over the muscle tendon junction of the Achilles without palpable step-off  Dorsiflexion strength is 5/5, plantar flexion strength discussed 5/5, inversion eversion strength 5/5  Nontender   Anterior Drawer negative negative   Calcaneal inversion negative negative   Squeeze Test negative negative       No subluxation of the peroneal tendons or tenderness to palpation along the peroneal tendons     No pain with palpation or range of motion of midfoot and forefoot bilaterally    No limp upon gait exam    No calf tenderness to palpation bilaterally    LE NV Exam: +2 DP/PT pulses bilaterally  Sensation intact to light touch L2-S1 bilaterally        Ankle Imaging:    X-rays of the right foot/ankle were reviewed, which demonstrate no acute osseous injuries     No significant Abby's deformity    I have reviewed the radiology report and do not currently have a radiology reading from [facility], but will check the result once the reading is performed

## 2022-09-19 ENCOUNTER — PROCEDURE VISIT (OUTPATIENT)
Dept: OBGYN CLINIC | Facility: MEDICAL CENTER | Age: 52
End: 2022-09-19
Payer: MEDICARE

## 2022-09-19 VITALS
BODY MASS INDEX: 29.7 KG/M2 | WEIGHT: 196 LBS | HEIGHT: 68 IN | HEART RATE: 55 BPM | DIASTOLIC BLOOD PRESSURE: 120 MMHG | SYSTOLIC BLOOD PRESSURE: 182 MMHG

## 2022-09-19 DIAGNOSIS — M22.2X2 PATELLOFEMORAL DISORDER OF LEFT KNEE: Primary | ICD-10-CM

## 2022-09-19 DIAGNOSIS — M22.2X1 PATELLOFEMORAL DISORDER OF RIGHT KNEE: ICD-10-CM

## 2022-09-19 PROCEDURE — 20610 DRAIN/INJ JOINT/BURSA W/O US: CPT | Performed by: PHYSICIAN ASSISTANT

## 2022-09-19 RX ORDER — HYALURONATE SODIUM 10 MG/ML
20 SYRINGE (ML) INTRAARTICULAR
Status: COMPLETED | OUTPATIENT
Start: 2022-09-19 | End: 2022-09-19

## 2022-09-19 RX ADMIN — Medication 20 MG: at 10:27

## 2022-09-19 NOTE — PROGRESS NOTES
Orthopaedic Surgery - Office Note  Ceci Leslie (47 y o  male)   : 1970   MRN: [id number]  Encounter Date: 2022    Chief Complaint   Patient presents with    Left Knee - Follow-up    Right Knee - Follow-up       Assessment / Plan  Bilateral knee patellofemoral pain with mild OA     · After discussion of risk and benefits the patient agreed to proceed with a Euflexxa injection at this time in his right knee  We did prepare and administer Euflexxa 1 of 3 to both knees at this time sterilely  The patient tolerated well  · Continue with ice and analgesics as needed for both knees  · He will continue with strengthening program for his hips and thighs  Return for Follow-up in 1 week for injection 2 of 3 in both knees  History of Present Illness  Ceci Leslie is a 47 y o  male follow-up for bilateral knee patellofemoral pain and mild OA here for Euflexxa injection 1 of 3 in both knees  He did have a right knee Euflexxa injection in 2020 which she feels helped for an extended period of time  Recently he has been having increased discomfort in both knees  He has done some home exercises that he learned in the past at therapy  He is a cyclist   He has had a steroid injection in the right knee in the past with some relief  He has continued with ice and analgesics when needed  Denies swelling or locking  Review of Systems  Pertinent items are noted in HPI  All other systems were reviewed and are negative  Physical Exam  BP (!) 182/120   Pulse 55   Ht 5' 8" (1 727 m) Comment: Verbal  Wt 88 9 kg (196 lb)   BMI 29 80 kg/m²   Cons: Appears well  No apparent distress  Psych: Alert  Oriented x3  Mood and affect normal   Eyes: PERRLA, EOMI  Resp: Normal effort  No audible wheezing or stridor  CV: Palpable pulse  No discernable arrhythmia  No LE edema  Lymph:  No palpable cervical, axillary, or inguinal lymphadenopathy  Skin: Warm  No palpable masses    No visible lesions  Neuro: Normal muscle tone  Normal and symmetric DTR's  Right Knee Exam  Alignment:  Normal knee alignment  Inspection:  No swelling  No edema  No erythema  No ecchymosis  No muscle atrophy  No deformity  Palpation:  mild tenderness medial joint line of knee  No effusion  No warmth  patella crepitus  ROM:  Normal knee ROM  Strength:  Able to SLR without lag  Stability:  No objective knee instability  Stable Varus / Valgus stress, Lachman, and Posterior drawer  Tests:  No pertinent positive or negative tests  Patella:  Patella tracks centrally without crepitus  Neurovascular:  Sensation intact in DP/SP/Bucio/Sa/T nerve distributions  Sensation intact to light touch in all other peripheral nerve distributions  2+ DP & PT pulses  Gait:  Normal      Left Knee Exam  Alignment:  Normal knee alignment  Inspection:  No swelling  No erythema  No ecchymosis  Palpation:  Mild tenderness at Patellar tendon  ROM:  Normal knee ROM  Strength:  Able to actively extend knee against gravity  Stability:  No objective knee instability  Stable Varus / Valgus stress, Lachman, and Posterior drawer  Tests:  (-) Jadiel  Patella:  Patella tracks centrally with crepitus  Neurovascular:  Sensation intact in DP/SP/Bucio/Sa/T nerve distributions  Sensation intact in all digital nerve distributions  Toes warm and perfused  Gait:  Normal     Studies Reviewed  I have personally reviewed pertinent films in PACS  XR of bilateral knee -  On 09/01/2022 mild OA of the medial comaprtments of both knees with mild spurring in the medial and patellofemoral compartments    Large joint arthrocentesis: bilateral knee  Universal Protocol:  Consent: Verbal consent obtained    Consent given by: patient    Supporting Documentation  Indications: pain   Procedure Details  Location: knee - bilateral knee  Needle size: 22 G  Approach: anterolateral    Medications (Right): 20 mg Sodium Hyaluronate 20 MG/2MLMedications (Left): 20 mg Sodium Hyaluronate 20 MG/2ML   Patient tolerance: patient tolerated the procedure well with no immediate complications  Dressing:  Sterile dressing applied             Medical, Surgical, Family, and Social History  The patient's medical history, family history, and social history, were reviewed and updated as appropriate  Past Medical History:   Diagnosis Date    Chronic back pain     Fibromyalgia, primary     Hypertension     RA (rheumatoid arthritis) ([facility] [address] )        Past Surgical History:   Procedure Laterality Date    CARPAL TUNNEL RELEASE Left     LATERAL EPICONDYLE RELEASE Right 2/25/2016    Procedure: RELEASE EPICONDYLAR ELBOW, LATERAL;  Surgeon: Thor Nesbitt MD;  [facility];  Service:     OR REPAIR ACHILLES TENDON,PRIMARY Left 4/2/2018    Procedure: REPAIR TENDON ACHILLES;  Surgeon: Jacinto Albarado MD;  [facility];  Service: Orthopedics    OR TENOTOMY ELBOW LATERAL/MEDIAL DEBRIDE OPEN Left 2/12/2019    Procedure: RELEASE EPICONDYLAR ELBOW;  Surgeon: Thor Nesbitt MD;  [facility];  Service: Orthopedics    OR WRIST [place] Dusky LIG Right 4/6/2022    Procedure: Right endoscopic carpal tunnel release ;  Surgeon: Thor Nesbitt MD;  [facility];  Service: Orthopedics    ROTATOR CUFF REPAIR Left     ULNAR TUNNEL RELEASE Left     x2       Family History   Problem Relation Age of Onset    Hypertension Mother     Diabetes Mother        Social History     Occupational History    Not on file   Tobacco Use    Smoking status: Never Smoker    Smokeless tobacco: Never Used   Vaping Use    Vaping Use: Never used   Substance and Sexual Activity    Alcohol use: No    Drug use: No    Sexual activity: Yes       Allergies   Allergen Reactions    Lisinopril      Other reaction(s):  Other (See Comments)  Cough and rash         Reglan [Metoclopramide] Other (See Comments)     Restless; akisthesia           Current Outpatient Medications:    acetaminophen (TYLENOL) 650 mg CR tablet, Take 1 tablet (650 mg total) by mouth every 8 (eight) hours as needed for mild pain, Disp: 30 tablet, Rfl: 0    amLODIPine (NORVASC) 10 mg tablet, Take 10 mg by mouth every evening  , Disp: , Rfl:     DULoxetine (CYMBALTA) 30 mg delayed release capsule, Take 1 capsule (30 mg total) by mouth daily, Disp: 30 capsule, Rfl: 2    DULoxetine (CYMBALTA) 60 mg delayed release capsule, Take 1 capsule (60 mg total) by mouth daily, Disp: 30 capsule, Rfl: 2    levothyroxine 25 mcg tablet, Take 25 mcg by mouth daily  , Disp: , Rfl: 0    levothyroxine 50 mcg tablet, Take 50 mcg by mouth daily, Disp: , Rfl:     losartan (COZAAR) 100 MG tablet, Take 100 mg by mouth daily, Disp: , Rfl:     meloxicam (MOBIC) 15 mg tablet, Take 15 mg by mouth, Disp: , Rfl:     metoprolol succinate (TOPROL-XL) 50 mg 24 hr tablet, "I take one a day bedtime", Disp: , Rfl:     naproxen sodium (ALEVE) 220 MG tablet, Take 1 tablet (220 mg total) by mouth 2 (two) times a day with meals, Disp: 20 tablet, Rfl: 0    oxybutynin (DITROPAN-XL) 5 mg 24 hr tablet, Take 5 mg by mouth daily, Disp: , Rfl:     tiZANidine (ZANAFLEX) 4 mg tablet, Take 1 tablet (4 mg total) by mouth every 8 (eight) hours as needed for muscle spasms, Disp: 90 tablet, Rfl: 0    traMADol (ULTRAM) 50 mg tablet, Take 50 mg by mouth every 6 (six) hours as needed for moderate pain, Disp: , Rfl:     triamcinolone (KENALOG) 0 1 % cream, if needed  , Disp: , Rfl:     hydrochlorothiazide (HYDRODIURIL) 25 mg tablet, Take 25 mg by mouth daily, Disp: , Rfl:     pantoprazole (PROTONIX) 40 mg tablet, Take 40 mg by mouth daily, Disp: , Rfl:         Scribe Attestation    I,:   am acting as a scribe while in the presence of the attending physician :       I,:   personally performed the services described in this documentation    as scribed in my presence :

## 2022-09-22 ENCOUNTER — HOSPITAL ENCOUNTER (EMERGENCY)
Facility: HOSPITAL | Age: 52
Discharge: HOME | End: 2022-09-22
Attending: EMERGENCY MEDICINE
Payer: MEDICARE

## 2022-09-22 ENCOUNTER — EVALUATION (OUTPATIENT)
Dept: PHYSICAL THERAPY | Facility: REHABILITATION | Age: 52
End: 2022-09-22
Payer: MEDICARE

## 2022-09-22 VITALS
TEMPERATURE: 97.6 F | RESPIRATION RATE: 18 BRPM | DIASTOLIC BLOOD PRESSURE: 110 MMHG | SYSTOLIC BLOOD PRESSURE: 190 MMHG | OXYGEN SATURATION: 97 % | HEART RATE: 65 BPM

## 2022-09-22 DIAGNOSIS — S81.812A LACERATION OF LEFT LOWER EXTREMITY, INITIAL ENCOUNTER: Primary | ICD-10-CM

## 2022-09-22 DIAGNOSIS — M76.61 TENDONITIS, ACHILLES, RIGHT: Primary | ICD-10-CM

## 2022-09-22 PROCEDURE — 99282 EMERGENCY DEPT VISIT SF MDM: CPT | Performed by: EMERGENCY MEDICINE

## 2022-09-22 PROCEDURE — 90715 TDAP VACCINE 7 YRS/> IM: CPT

## 2022-09-22 PROCEDURE — 97162 PT EVAL MOD COMPLEX 30 MIN: CPT

## 2022-09-22 PROCEDURE — 97110 THERAPEUTIC EXERCISES: CPT

## 2022-09-22 PROCEDURE — 99282 EMERGENCY DEPT VISIT SF MDM: CPT

## 2022-09-22 PROCEDURE — 90471 IMMUNIZATION ADMIN: CPT

## 2022-09-22 PROCEDURE — 12001 RPR S/N/AX/GEN/TRNK 2.5CM/<: CPT | Performed by: EMERGENCY MEDICINE

## 2022-09-22 RX ORDER — LIDOCAINE HYDROCHLORIDE AND EPINEPHRINE 10; 10 MG/ML; UG/ML
5 INJECTION, SOLUTION INFILTRATION; PERINEURAL ONCE
Status: COMPLETED | OUTPATIENT
Start: 2022-09-22 | End: 2022-09-22

## 2022-09-22 RX ADMIN — LIDOCAINE HYDROCHLORIDE,EPINEPHRINE BITARTRATE 5 ML: 10; .01 INJECTION, SOLUTION INFILTRATION; PERINEURAL at 20:53

## 2022-09-22 RX ADMIN — TETANUS TOXOID, REDUCED DIPHTHERIA TOXOID AND ACELLULAR PERTUSSIS VACCINE, ADSORBED 0.5 ML: 5; 2.5; 8; 8; 2.5 SUSPENSION INTRAMUSCULAR at 20:55

## 2022-09-22 NOTE — PROGRESS NOTES
PT Evaluation     Today's date: 2022  Patient name: Ingrid Matos  : 1970  MRN: [id number]  Referring provider: Mendoza Adams DO  Dx:   Encounter Diagnosis     ICD-10-CM    1  Tendonitis, Achilles, right  M76 [id number] Ambulatory Referral to Physical Therapy       Start Time: 1345  Stop Time: 1445  Total time in clinic (min): 60 minutes    Assessment  Assessment details: Ingrid Matos is a 48 y/o male presenting with chronic R ankle pain in achilles and calf  Patient demonstrates decreased ankle and hip mobility, decreased strength, and neural tension to sciatic nerve  Patient has ongoing symptoms of bilateral plantar fasciitis and bilateral knee pain  Patient has history of L achilles repair  Findings limit patient's ability to walk on an incline on treadmill and squat while at the gym  Patient will benefit from skilled PT to improve strength, improve ROM, and improve function     Impairments: abnormal or restricted ROM, activity intolerance, impaired physical strength and pain with function    Symptom irritability: moderateUnderstanding of Dx/Px/POC: good   Prognosis: good    Goals  Short Term Goals (4 Weeks):  1 ) Patient will report 50% decrease in pain   2 ) Patient will report walking on flat surface on treadmill for 30 minutes with <3/10 pain   3 ) Patient will perform 10 SL heel raises      Long Term Goals (8 Weeks):   1 ) Patient will be able to walk on inclined treadmill for 25 minutes without increase in sx  2 ) Patient will demonstrate 30 SL heel raises with <3/10 pain  3 ) Exceed FOTO predicted score  4 ) Patient will be fully independent with HEP by [address]  Patient would benefit from: skilled physical therapy  Planned therapy interventions: manual therapy, joint mobilization, massage, flexibility, functional ROM exercises, graded exercise, home exercise program, therapeutic training, therapeutic exercise, therapeutic activities, stretching, strengthening, patient education, neuromuscular re-education and activity modification        Subjective Evaluation    History of Present Illness  Mechanism of injury: Patient reports having pain in his R achilles tendon that has been going on for almost a year  Reports having achilles repair on the L about 3 years and noticed R sided pain after he began walking following surgery  Current episode began during the last snowstorm when he was digging cars out of the snow and noticed pain after  Describes sx as shocking/electrifying in his achilles into his R calf, and also tightness in his R calf  Location of sx is primarily in the achilles and into the calf  Pt has plantar fasciitis in both feet and also received injections in his knees on Monday  Sx aggravated when walking on treadmill especially when on incline and stairs  Alleviated with ankle wrap, inserting wedges into shoes, Voltaren cream, ice, tried Aleve but elevated BP so does not take anymore  States electrical shock feeling will sometimes wake him up at night  Reports have numbness and tingling all the time due to neuropathy  History of low back pain treated with injections  Denies any swelling or palpable nodule in calf  Has not had treatment of R achilles in the past  Currently on disability and not working, 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  Goes to the gym on a daily basis where he walks on the treadmill for about 10-15 minutes  Used to be able to walk on incline for 45 minutes but has not been able to do since increase in symptoms     Quality of life: good    Pain  Current pain ratin  At best pain ratin  At worst pain rating: 10    Social Support  Stairs in house: yes       Diagnostic Tests  X-ray: normal  Patient Goals  Patient goal: Would like to have to the ability to run, getting back to normality including getting back to work, adding inclined walking on treadmill back into gym routine, squatting        Objective   Palpation: proximal tendon    Ankle PROM: DF (KE) 5 (KF) 5, PF WNL, Inversion 25, Eversion 15  Joint Assessment: TCJ hypomobile     Functional Squat: increased forward weight shift, increased tension in achilles, knee dominant     MMT: PF 4-/5, DF 5/5, inversion 4/5, eversion 4/5    Knee PROM: WNL  Hip PROM: mild/mod limitations throughout, particularly hamstrings and ER  Great Toe Extension: 45      Forward Lunge Test  R: 3 inch  L: 1 inch     Special Tests  (+) Slump Test   (-) SLR   (-) Davide Test   (+) IVAN       Precautions: HTN, RA, fibromyalgia, multiple orthopedic surgeries        Manuals 9/22            TCJ A-P mobs             IASTM/STM             Great Toe Extension Distraction             Assess SL heel raise and lumbar              Neuro Re-Ed 9/22            Seated Slump Nerve Glide HEP             Seated PF with TB HEP                                                                             Ther Ex 9/22            Seated heel raises             VG             Leg press heel raises              Soleus Wall Stretch  HEP                                                                 Ther Activity                                       Gait Training                                       Modalities

## 2022-09-23 NOTE — DISCHARGE INSTRUCTIONS
Please return to the emergency department in 7-10 days to have your sutures removed  Please keep your wound clean and dry  Please return to the emergency department if develop any new or concerning symptoms including redness, drainage, extreme pain, or difficulty moving your left knee

## 2022-09-23 NOTE — ED PROVIDER NOTES
History  Chief Complaint   Patient presents with    Extremity Laceration     Pt reports using a  to polish rust off of something and the  caught his left thigh  About an inch long laceration on left thigh  8/10 pain reported  Patient is a 55-year-old male presents following a left leg injury  Patient was using a  on his car when as he was turning it off the blade cut into his left leg right above his knee  Patient was able to pull away the plate in time such that it only penetrated his subcutaneous tissue  Patient washes with peroxide and came straight to the emergency department  Patient denies any pain in his knee, states he has full range of motion, no difficulty walking  Patient does not remember his last tetanus shot  Patient denies any active bleeding at this time  Denies any other complaints  Prior to Admission Medications   Prescriptions Last Dose Informant Patient Reported? Taking?    DULoxetine (CYMBALTA) 30 mg delayed release capsule  Self No No   Sig: Take 1 capsule (30 mg total) by mouth daily   DULoxetine (CYMBALTA) 60 mg delayed release capsule  Self No No   Sig: Take 1 capsule (60 mg total) by mouth daily   acetaminophen (TYLENOL) 650 mg CR tablet  Self No No   Sig: Take 1 tablet (650 mg total) by mouth every 8 (eight) hours as needed for mild pain   amLODIPine (NORVASC) 10 mg tablet  Self Yes No   Sig: Take 10 mg by mouth every evening     hydrochlorothiazide (HYDRODIURIL) 25 mg tablet   Yes No   Sig: Take 25 mg by mouth daily   levothyroxine 25 mcg tablet  Self Yes No   Sig: Take 25 mcg by mouth daily     levothyroxine 50 mcg tablet  Self Yes No   Sig: Take 50 mcg by mouth daily   losartan (COZAAR) 100 MG tablet  Self Yes No   Sig: Take 100 mg by mouth daily   meloxicam (MOBIC) 15 mg tablet  Self Yes No   Sig: Take 15 mg by mouth   metoprolol succinate (TOPROL-XL) 50 mg 24 hr tablet  Self Yes No   Sig: "I take one a day bedtime"   naproxen sodium (ALEVE) 220 MG tablet  Self No No   Sig: Take 1 tablet (220 mg total) by mouth 2 (two) times a day with meals   oxybutynin (DITROPAN-XL) 5 mg 24 hr tablet  Self Yes No   Sig: Take 5 mg by mouth daily   pantoprazole (PROTONIX) 40 mg tablet  Self Yes No   Sig: Take 40 mg by mouth daily   tiZANidine (ZANAFLEX) 4 mg tablet  Self No No   Sig: Take 1 tablet (4 mg total) by mouth every 8 (eight) hours as needed for muscle spasms   traMADol (ULTRAM) 50 mg tablet  Self Yes No   Sig: Take 50 mg by mouth every 6 (six) hours as needed for moderate pain   triamcinolone (KENALOG) 0 1 % cream  Self Yes No   Sig: if needed        Facility-Administered Medications: None       Past Medical History:   Diagnosis Date    Chronic back pain     Fibromyalgia, primary     Hypertension     RA [facility]rheumatoid arthritis) ([facility])        Past Surgical History:   Procedure Laterality Date    CARPAL TUNNEL RELEASE Left     LATERAL EPICONDYLE RELEASE Right 2/25/2016    Procedure: RELEASE EPICONDYLAR ELBOW, LATERAL;  Surgeon: Rolando Portillo MD;  [facility];  Service:     MN REPAIR ACHILLES TENDON,PRIMARY Left 4/2/2018    Procedure: REPAIR TENDON ACHILLES;  Surgeon: Flynn Moran MD;  [facility];  Service: Orthopedics    MN TENOTOMY ELBOW LATERAL/MEDIAL DEBRIDE OPEN Left 2/12/2019    Procedure: RELEASE EPICONDYLAR ELBOW;  Surgeon: Rolando Portillo MD;  [facility];  Service: Orthopedics    MN WRIST Chandrika Brownie LIG Right 4/6/2022    Procedure: Right endoscopic carpal tunnel release ;  Surgeon: Rolando Poritllo MD;  [facility];  Service: Orthopedics    ROTATOR CUFF REPAIR Left     ULNAR TUNNEL RELEASE Left     x2       Family History   Problem Relation Age of Onset    Hypertension Mother     Diabetes Mother      I have reviewed and agree with the history as documented      E-Cigarette/Vaping    E-Cigarette Use Never User      E-Cigarette/Vaping Substances    Nicotine No     THC No  CBD No     Flavoring No     Other No     Unknown No      Social History     Tobacco Use    Smoking status: Never Smoker    Smokeless tobacco: Never Used   Vaping Use    Vaping Use: Never used   Substance Use Topics    Alcohol use: No    Drug use: No        Review of Systems   Constitutional: Negative for chills and fever  HENT: Negative for ear pain and sore throat  Eyes: Negative for pain and visual disturbance  Respiratory: Negative for cough and shortness of breath  Cardiovascular: Negative for chest pain and palpitations  Gastrointestinal: Negative for abdominal pain and vomiting  Genitourinary: Negative for dysuria and hematuria  Musculoskeletal: Negative for arthralgias and back pain  Skin: Positive for wound  Negative for color change  Neurological: Negative for seizures and syncope  Psychiatric/Behavioral: Negative for confusion and decreased concentration  All other systems reviewed and are negative  Physical Exam  ED Triage Vitals [09/22/22 2017]   Temperature Pulse Respirations Blood Pressure SpO2   97 6 °F (36 4 °C) 65 18 (!) 190/110 97 %      Temp Source Heart Rate Source Patient Position - Orthostatic VS BP Location FiO2 (%)   Tympanic Monitor Sitting Left arm --      Pain Score       8             Orthostatic Vital Signs  Vitals:    09/22/22 2017   BP: (!) 190/110   Pulse: 65   Patient Position - Orthostatic VS: Sitting       Physical Exam  Vitals and nursing note reviewed  Constitutional:       General: He is not in acute distress  Appearance: Normal appearance  He is normal weight  He is not ill-appearing, toxic-appearing or diaphoretic  HENT:      Head: Normocephalic and atraumatic  Right Ear: External ear normal       Left Ear: External ear normal       Nose: Nose normal  No congestion or rhinorrhea  Eyes:      General: No scleral icterus  Right eye: No discharge  Left eye: No discharge        Conjunctiva/sclera: Conjunctivae normal    Cardiovascular:      Rate and Rhythm: Normal rate and regular rhythm  Pulses: Normal pulses  Heart sounds: Normal heart sounds  No murmur heard  No friction rub  No gallop  Pulmonary:      Effort: Pulmonary effort is normal  No respiratory distress  Breath sounds: Normal breath sounds  No stridor  No wheezing or rales  Abdominal:      General: Abdomen is flat  Bowel sounds are normal       Palpations: Abdomen is soft  Musculoskeletal:         General: No swelling, tenderness or signs of injury  Normal range of motion  Skin:     General: Skin is warm and dry  Findings: Lesion present  Comments: There is a 2 cm laceration just superior to the knee  There is some dirt and some very small metal fragments in the wound  Neurological:      Mental Status: He is alert  Psychiatric:         Mood and Affect: Mood normal          Behavior: Behavior normal          Thought Content: Thought content normal          Judgment: Judgment normal          ED Medications  Medications   lidocaine-epinephrine (XYLOCAINE/EPINEPHRINE) 1 %-1:100,000 injection 5 mL (5 mL Infiltration Given by Other 9/22/22 2053)   tetanus-diphtheria-acellular pertussis (BOOSTRIX) IM injection 0 5 mL (0 5 mL Intramuscular Given 9/22/22 2055)       Diagnostic Studies  Results Reviewed     None                 No orders to display         Procedures  Laceration repair    Date/Time: 9/22/2022 11:16 PM  Performed by: Sami Wolff MD  Authorized by: Sami Wolff MD   Consent: Verbal consent obtained  Body area: lower extremity  Location details: left upper leg  Laceration length: 2 cm  Contamination: The wound is contaminated    Foreign bodies: metal (Dirt)  Tendon involvement: none  Nerve involvement: none  Vascular damage: no    Anesthesia:  Local Anesthetic: lidocaine 1% with epinephrine  Anesthetic total: 3 mL    Sedation:  Patient sedated: no      Wound Dehiscence:  Superficial Wound Dehiscence: simple closure      Procedure Details:  Irrigation solution: saline  Irrigation method: syringe  Amount of cleaning: standard  Debridement: moderate  Degree of undermining: none  Skin closure: 4-0 Prolene  Number of sutures: 4  Technique: simple  Approximation: close  Approximation difficulty: simple  Patient tolerance: patient tolerated the procedure well with no immediate complications  Cleaning details: dirt and metal          ED Course                                       MDM  Number of Diagnoses or Management Options  Laceration of left lower extremity, initial encounter: minor  Diagnosis management comments: Patient is a 50-year-old male who presents with a laceration of his left lower extremity  Wound was thoroughly cleaned and debrided  Patient given updated tetanus  Wound closed with good approximation of the wound edges  Patient advised to follow-up in 7-10 days to have his sutures removed, advised to keep wound clean and dry  Discharge with return precautions  Amount and/or Complexity of Data Reviewed  Review and summarize past medical records: yes  Discuss the patient with other providers: yes    Patient Progress  Patient progress: improved      Disposition  Final diagnoses:   Laceration of left lower extremity, initial encounter     Time reflects when diagnosis was documented in both MDM as applicable and the Disposition within this note     Time User Action Codes Description Comment    9/22/2022  9:07 PM Ranjit Wise Add [[id number] [id number]] Laceration of left lower extremity, initial encounter       ED Disposition     ED Disposition   Discharge    Condition   Good    Date/Time   Thu Sep 22, 2022  9:07 PM    [facility] discharge to home/self care                 Follow-up Information     Follow up With Specialties Details Why Reynaldo Augustine MD Internal Medicine Call  As needed [phone] [phone] [facility] [address]  [address]  Emergency Department Emergency Medicine Go to  If symptoms worsen or if you have any other specific concerns [address] Emergency [address], [address], [place], [place], [address]          Discharge Medication List as of 9/22/2022  9:10 PM      CONTINUE these medications which have NOT CHANGED    Details   acetaminophen (TYLENOL) 650 mg CR tablet Take 1 tablet (650 mg total) by mouth every 8 (eight) hours as needed for mild pain, Starting Wed 4/6/2022, Normal      amLODIPine (NORVASC) 10 mg tablet Take 10 mg by mouth every evening  , Historical Med      !! DULoxetine (CYMBALTA) 30 mg delayed release capsule Take 1 capsule (30 mg total) by mouth daily, Starting Thu 2/3/2022, Normal      !!  DULoxetine (CYMBALTA) 60 mg delayed release capsule Take 1 capsule (60 mg total) by mouth daily, Starting Thu 2/3/2022, Normal      hydrochlorothiazide (HYDRODIURIL) 25 mg tablet Take 25 mg by mouth daily, Starting Fri 7/9/2021, Until Sat 7/9/2022, Historical Med      !! levothyroxine 25 mcg tablet Take 25 mcg by mouth daily  , Starting Mon 12/10/2018, Historical Med      !! levothyroxine 50 mcg tablet Take 50 mcg by mouth daily, Starting Wed 8/3/2022, Historical Med      losartan (COZAAR) 100 MG tablet Take 100 mg by mouth daily, Starting Mon 9/28/2020, Historical Med      meloxicam (MOBIC) 15 mg tablet Take 15 mg by mouth, Starting Fri 12/14/2018, Historical Med      metoprolol succinate (TOPROL-XL) 50 mg 24 hr tablet "I take one a day bedtime", Historical Med      naproxen sodium (ALEVE) 220 MG tablet Take 1 tablet (220 mg total) by mouth 2 (two) times a day with meals, Starting Wed 4/6/2022, Normal      oxybutynin (DITROPAN-XL) 5 mg 24 hr tablet Take 5 mg by mouth daily, Starting Fri 5/1/2020, Historical Med      pantoprazole (PROTONIX) 40 mg tablet Take 40 mg by mouth daily, Starting Fri 7/24/2020, Until Thu 3/31/2022, Historical Med      tiZANidine (ZANAFLEX) 4 mg tablet Take 1 tablet (4 mg total) by mouth every 8 (eight) hours as needed for muscle spasms, Starting Wed 2/2/2022, Normal      traMADol (ULTRAM) 50 mg tablet Take 50 mg by mouth every 6 (six) hours as needed for moderate pain, Historical Med      triamcinolone (KENALOG) 0 1 % cream if needed  , Starting Fri 12/11/2020, Historical Med       !! - Potential duplicate medications found  Please discuss with provider  No discharge procedures on file  PDMP Review       Value Time User    PDMP Reviewed  Yes 6/7/2021  8:15 AM Yo Matta, [address]           ED Provider  Attending physically available and evaluated Laz Sterling I managed the patient along with the ED Attending      Electronically Signed by         Jacinto Garsia MD  09/22/22 [id number]

## 2022-09-26 ENCOUNTER — PROCEDURE VISIT (OUTPATIENT)
Dept: OBGYN CLINIC | Facility: MEDICAL CENTER | Age: 52
End: 2022-09-26
Payer: MEDICARE

## 2022-09-26 VITALS
SYSTOLIC BLOOD PRESSURE: 177 MMHG | DIASTOLIC BLOOD PRESSURE: 124 MMHG | WEIGHT: 196 LBS | BODY MASS INDEX: 29.7 KG/M2 | HEIGHT: 68 IN | HEART RATE: 66 BPM

## 2022-09-26 DIAGNOSIS — M22.2X2 PATELLOFEMORAL DISORDER OF LEFT KNEE: Primary | ICD-10-CM

## 2022-09-26 DIAGNOSIS — M22.2X1 PATELLOFEMORAL DISORDER OF RIGHT KNEE: ICD-10-CM

## 2022-09-26 PROCEDURE — 20610 DRAIN/INJ JOINT/BURSA W/O US: CPT | Performed by: PHYSICIAN ASSISTANT

## 2022-09-26 RX ORDER — HYALURONATE SODIUM 10 MG/ML
20 SYRINGE (ML) INTRAARTICULAR
Status: COMPLETED | OUTPATIENT
Start: 2022-09-26 | End: 2022-09-26

## 2022-09-26 RX ADMIN — Medication 20 MG: at 10:15

## 2022-09-26 NOTE — PROGRESS NOTES
Orthopaedic Surgery - Office Note  Cele Krishnamurthy (47 y o  male)   : 1970   MRN: [id number]  Encounter Date: 2022    Chief Complaint   Patient presents with    Left Knee - Follow-up    Right Knee - Follow-up       Assessment / Plan  Bilateral knee patellofemoral pain with mild OA     · After discussion of risk and benefits the patient agreed to proceed with a Euflexxa injection at this time in both knees  We did prepare and administer Euflexxa 2 of 3 to both knees at this time sterilely  The patient tolerated well  · Continue with ice and analgesics as needed for both knees  · He will continue with strengthening program for his hips and thighs  Return in about 1 week (around 10/3/2022) for Follow-up with Mariam Ware  History of Present Illness  Cele Krishnamurthy is a 47 y o  male follow-up for bilateral knee patellofemoral pain and mild OA here for Euflexxa injection 2 of 3 in both knees  He tolerated the 1st injection of the series well  He did last have a right knee Euflexxa injection in 2020 which she feels helped for an extended period of time  Recently he has been having increased discomfort in both knees  He has done some home exercises that he learned in the past at therapy  He is a cyclist   He has had a steroid injection in the right knee in the past with some relief  He has continued with ice and analgesics when needed  Denies swelling or locking  Review of Systems  Pertinent items are noted in HPI  All other systems were reviewed and are negative  Physical Exam  BP (!) 177/124   Pulse 66   Ht 5' 8" (1 727 m)   Wt 88 9 kg (196 lb)   BMI 29 80 kg/m²   Cons: Appears well  No apparent distress  Psych: Alert  Oriented x3  Mood and affect normal   Eyes: PERRLA, EOMI  Resp: Normal effort  No audible wheezing or stridor  CV: Palpable pulse  No discernable arrhythmia  No LE edema    Lymph:  No palpable cervical, axillary, or inguinal lymphadenopathy  Skin: Warm  No palpable masses  No visible lesions  Neuro: Normal muscle tone  Normal and symmetric DTR's  Right Knee Exam  Alignment:  Normal knee alignment  Inspection:  No swelling  No edema  No erythema  No ecchymosis  No muscle atrophy  No deformity  Palpation:  mild tenderness medial joint line of knee  No effusion  No warmth  patella crepitus  ROM:  Normal knee ROM  Strength:  Able to SLR without lag  Stability:  No objective knee instability  Stable Varus / Valgus stress, Lachman, and Posterior drawer  Tests:  No pertinent positive or negative tests  Patella:  Patella tracks centrally without crepitus  Neurovascular:  Sensation intact in DP/SP/Bucio/Sa/T nerve distributions  Sensation intact to light touch in all other peripheral nerve distributions  2+ DP & PT pulses  Gait:  Normal      Left Knee Exam  Alignment:  Normal knee alignment  Inspection:  No swelling  No erythema  No ecchymosis  Palpation:  Mild tenderness at Patellar tendon  ROM:  Normal knee ROM  Strength:  Able to actively extend knee against gravity  Stability:  No objective knee instability  Stable Varus / Valgus stress, Lachman, and Posterior drawer  Tests:  (-) Jadiel  Patella:  Patella tracks centrally with crepitus  Neurovascular:  Sensation intact in DP/SP/Bucio/Sa/T nerve distributions  Sensation intact in all digital nerve distributions  Toes warm and perfused  Gait:  Normal     Studies Reviewed  I have personally reviewed pertinent films in PACS  XR of bilateral knee -  On 09/01/2022 mild OA of the medial comaprtments of both knees with mild spurring in the medial and patellofemoral compartments    Large joint arthrocentesis: bilateral knee  Universal Protocol:  Consent: Verbal consent obtained    Consent given by: patient    Supporting Documentation  Indications: pain   Procedure Details  Location: knee - bilateral knee  Needle size: 22 G  Approach: anterolateral    Medications (Right): 20 mg Sodium Hyaluronate 20 MG/2MLMedications (Left): 20 mg Sodium Hyaluronate 20 MG/2ML   Patient tolerance: patient tolerated the procedure well with no immediate complications  Dressing:  Sterile dressing applied             Medical, Surgical, Family, and Social History  The patient's medical history, family history, and social history, were reviewed and updated as appropriate  Past Medical History:   Diagnosis Date    Chronic back pain     Fibromyalgia, primary     Hypertension     RA (rheumatoid arthritis) ([facility] [address] )        Past Surgical History:   Procedure Laterality Date    CARPAL TUNNEL RELEASE Left     LATERAL EPICONDYLE RELEASE Right 2/25/2016    Procedure: RELEASE EPICONDYLAR ELBOW, LATERAL;  Surgeon: Bo Barker MD;  [facility];  Service:     DE REPAIR ACHILLES TENDON,PRIMARY Left 4/2/2018    Procedure: REPAIR TENDON ACHILLES;  Surgeon: Velma Montaño MD;  [facility];  Service: Orthopedics    DE TENOTOMY ELBOW LATERAL/MEDIAL DEBRIDE OPEN Left 2/12/2019    Procedure: RELEASE EPICONDYLAR ELBOW;  Surgeon: Bo Barker MD;  [facility];  Service: Orthopedics    DE WRIST Saul Pod LIG Right 4/6/2022    Procedure: Right endoscopic carpal tunnel release ;  Surgeon: Bo Barker MD;  [facility];  Service: Orthopedics    ROTATOR CUFF REPAIR Left     ULNAR TUNNEL RELEASE Left     x2       Family History   Problem Relation Age of Onset    Hypertension Mother     Diabetes Mother        Social History     Occupational History    Not on file   Tobacco Use    Smoking status: Never Smoker    Smokeless tobacco: Never Used   Vaping Use    Vaping Use: Never used   Substance and Sexual Activity    Alcohol use: No    Drug use: No    Sexual activity: Yes       Allergies   Allergen Reactions    Lisinopril      Other reaction(s):  Other (See Comments)  Cough and rash         Reglan [Metoclopramide] Other (See Comments)     Restless; akisthesia           Current Outpatient Medications:     acetaminophen (TYLENOL) 650 mg CR tablet, Take 1 tablet (650 mg total) by mouth every 8 (eight) hours as needed for mild pain, Disp: 30 tablet, Rfl: 0    amLODIPine (NORVASC) 10 mg tablet, Take 10 mg by mouth every evening  , Disp: , Rfl:     DULoxetine (CYMBALTA) 30 mg delayed release capsule, Take 1 capsule (30 mg total) by mouth daily, Disp: 30 capsule, Rfl: 2    DULoxetine (CYMBALTA) 60 mg delayed release capsule, Take 1 capsule (60 mg total) by mouth daily, Disp: 30 capsule, Rfl: 2    hydrochlorothiazide (HYDRODIURIL) 25 mg tablet, Take 25 mg by mouth daily, Disp: , Rfl:     levothyroxine 25 mcg tablet, Take 25 mcg by mouth daily  , Disp: , Rfl: 0    levothyroxine 50 mcg tablet, Take 50 mcg by mouth daily, Disp: , Rfl:     losartan (COZAAR) 100 MG tablet, Take 100 mg by mouth daily, Disp: , Rfl:     meloxicam (MOBIC) 15 mg tablet, Take 15 mg by mouth, Disp: , Rfl:     metoprolol succinate (TOPROL-XL) 50 mg 24 hr tablet, "I take one a day bedtime", Disp: , Rfl:     naproxen sodium (ALEVE) 220 MG tablet, Take 1 tablet (220 mg total) by mouth 2 (two) times a day with meals, Disp: 20 tablet, Rfl: 0    oxybutynin (DITROPAN-XL) 5 mg 24 hr tablet, Take 5 mg by mouth daily, Disp: , Rfl:     pantoprazole (PROTONIX) 40 mg tablet, Take 40 mg by mouth daily, Disp: , Rfl:     tiZANidine (ZANAFLEX) 4 mg tablet, Take 1 tablet (4 mg total) by mouth every 8 (eight) hours as needed for muscle spasms, Disp: 90 tablet, Rfl: 0    traMADol (ULTRAM) 50 mg tablet, Take 50 mg by mouth every 6 (six) hours as needed for moderate pain, Disp: , Rfl:     triamcinolone (KENALOG) 0 1 % cream, if needed  , Disp: , Rfl:         Scribe Attestation    I,:   am acting as a scribe while in the presence of the attending physician :       I,:   personally performed the services described in this documentation    as scribed in my presence :

## 2022-10-03 ENCOUNTER — PROCEDURE VISIT (OUTPATIENT)
Dept: OBGYN CLINIC | Facility: MEDICAL CENTER | Age: 52
End: 2022-10-03
Payer: MEDICARE

## 2022-10-03 VITALS
HEART RATE: 62 BPM | BODY MASS INDEX: 29.7 KG/M2 | HEIGHT: 68 IN | SYSTOLIC BLOOD PRESSURE: 199 MMHG | DIASTOLIC BLOOD PRESSURE: 131 MMHG | WEIGHT: 196 LBS

## 2022-10-03 DIAGNOSIS — M22.2X1 PATELLOFEMORAL DISORDER OF RIGHT KNEE: ICD-10-CM

## 2022-10-03 DIAGNOSIS — M22.2X2 PATELLOFEMORAL DISORDER OF LEFT KNEE: Primary | ICD-10-CM

## 2022-10-03 PROCEDURE — 20610 DRAIN/INJ JOINT/BURSA W/O US: CPT | Performed by: PHYSICIAN ASSISTANT

## 2022-10-03 RX ORDER — HYALURONATE SODIUM 10 MG/ML
20 SYRINGE (ML) INTRAARTICULAR
Status: COMPLETED | OUTPATIENT
Start: 2022-10-03 | End: 2022-10-03

## 2022-10-03 RX ADMIN — Medication 20 MG: at 10:25

## 2022-10-03 NOTE — PROGRESS NOTES
Orthopaedic Surgery - Office Note  Jessy Yancey (47 y o  male)   : 1970   MRN: [id number]  Encounter Date: 10/3/2022    Chief Complaint   Patient presents with    Left Knee - Follow-up    Right Knee - Follow-up       Assessment / Plan  Bilateral knee patellofemoral pain with mild OA     · After discussion of risk and benefits the patient agreed to proceed with a Euflexxa injection at this time in both knees  We did prepare and administer Euflexxa 3 of 3 to both knees at this time sterilely  The patient tolerated well  · Continue with ice and analgesics as needed for both knees  · He will continue with strengthening program for his hips and thighs  · We did have a discussion about treatment options going forward including repeat steroid injections or viscosupplementation which she has done in the past and will contact us when needed  Return if symptoms worsen or fail to improve  History of Present Illness  Jessy Yancey is a 47 y o  male follow-up for bilateral knee patellofemoral pain and mild OA here for Euflexxa injection 3 of 3 in both knees  His last visit after the 2nd injection he did have stiffness in left knee for about a day or to that has not resolved otherwise tolerated them well  He has done some home exercises that he learned in the past at therapy  He is a cyclist   He has had a steroid injection in the right knee in the past with some relief  He has continued with ice and analgesics when needed  Denies swelling or locking  Review of Systems  Pertinent items are noted in HPI  All other systems were reviewed and are negative  Physical Exam  BP (!) 199/131   Pulse 62   Ht 5' 8" (1 727 m)   Wt 88 9 kg (196 lb)   BMI 29 80 kg/m²   Cons: Appears well  No apparent distress  Psych: Alert  Oriented x3  Mood and affect normal   Eyes: PERRLA, EOMI  Resp: Normal effort  No audible wheezing or stridor  CV: Palpable pulse  No discernable arrhythmia    No LE edema   Lymph:  No palpable cervical, axillary, or inguinal lymphadenopathy  Skin: Warm  No palpable masses  No visible lesions  Neuro: Normal muscle tone  Normal and symmetric DTR's  Right Knee Exam  Alignment:  Normal knee alignment  Inspection:  No swelling  No edema  No erythema  No ecchymosis  No muscle atrophy  No deformity  Palpation:  mild tenderness medial joint line of knee  No effusion  No warmth  patella crepitus  ROM:  Normal knee ROM  Strength:  Able to SLR without lag  Stability:  No objective knee instability  Stable Varus / Valgus stress, Lachman, and Posterior drawer  Tests:  No pertinent positive or negative tests  Patella:  Patella tracks centrally without crepitus  Neurovascular:  Sensation intact in DP/SP/Bucio/Sa/T nerve distributions  Sensation intact to light touch in all other peripheral nerve distributions  2+ DP & PT pulses  Gait:  Normal      Left Knee Exam  Alignment:  Normal knee alignment  Inspection:  No swelling  No erythema  No ecchymosis  Palpation:  Mild tenderness at Patellar tendon  ROM:  Normal knee ROM  Strength:  Able to actively extend knee against gravity  Stability:  No objective knee instability  Stable Varus / Valgus stress, Lachman, and Posterior drawer  Tests:  (-) Jadiel  Patella:  Patella tracks centrally with crepitus  Neurovascular:  Sensation intact in DP/SP/Bucio/Sa/T nerve distributions  Sensation intact in all digital nerve distributions  Toes warm and perfused  Gait:  Normal     Studies Reviewed  I have personally reviewed pertinent films in PACS  XR of bilateral knee -  On 09/01/2022 mild OA of the medial comaprtments of both knees with mild spurring in the medial and patellofemoral compartments    Large joint arthrocentesis: bilateral knee  Universal Protocol:  Consent: Verbal consent obtained    Consent given by: patient    Procedure Details  Location: knee - bilateral knee  Needle size: 22 G  Approach: anterolateral    Medications (Right): 20 mg Sodium Hyaluronate 20 MG/2MLMedications (Left): 20 mg Sodium Hyaluronate 20 MG/2ML   Patient tolerance: patient tolerated the procedure well with no immediate complications  Dressing:  Sterile dressing applied             Medical, Surgical, Family, and Social History  The patient's medical history, family history, and social history, were reviewed and updated as appropriate  Past Medical History:   Diagnosis Date    Chronic back pain     Fibromyalgia, primary     Hypertension     RA [facility]rhe[facility]oid arthritis) ([facility] [address] )        Past Surgical History:   Procedure Laterality Date    CARPAL TUNNEL RELEASE Left     LATERAL EPICONDYLE RELEASE Right 2/25/2016    Procedure: RELEASE EPICONDYLAR ELBOW, LATERAL;  Surgeon: Keven Escalante MD;  [facility];  Service:     CT REPAIR ACHILLES TENDON,PRIMARY Left 4/2/2018    Procedure: REPAIR TENDON ACHILLES;  Surgeon: Stephanie Russell MD;  [facility];  Service: Orthopedics    CT TENOTOMY ELBOW LATERAL/MEDIAL DEBRIDE OPEN Left 2/12/2019    Procedure: RELEASE EPICONDYLAR ELBOW;  Surgeon: Keven Escalante MD;  [facility];  Service: Orthopedics    CT WRIST Shasta Bud LIG Right 4/6/2022    Procedure: Right endoscopic carpal tunnel release ;  Surgeon: Keven Escalante MD;  [facility];  Service: Orthopedics    ROTATOR CUFF REPAIR Left     ULNAR TUNNEL RELEASE Left     x2       Family History   Problem Relation Age of Onset    Hypertension Mother     Diabetes Mother        Social History     Occupational History    Not on file   Tobacco Use    Smoking status: Never Smoker    Smokeless tobacco: Never Used   Vaping Use    Vaping Use: Never used   Substance and Sexual Activity    Alcohol use: No    Drug use: No    Sexual activity: Yes       Allergies   Allergen Reactions    Lisinopril      Other reaction(s):  Other (See Comments)  Cough and rash         Reglan [Metoclopramide] Other (See Comments)     Restless; akisthesia           Current Outpatient Medications:     acetaminophen (TYLENOL) 650 mg CR tablet, Take 1 tablet (650 mg total) by mouth every 8 (eight) hours as needed for mild pain, Disp: 30 tablet, Rfl: 0    amLODIPine (NORVASC) 10 mg tablet, Take 10 mg by mouth every evening  , Disp: , Rfl:     DULoxetine (CYMBALTA) 30 mg delayed release capsule, Take 1 capsule (30 mg total) by mouth daily, Disp: 30 capsule, Rfl: 2    DULoxetine (CYMBALTA) 60 mg delayed release capsule, Take 1 capsule (60 mg total) by mouth daily, Disp: 30 capsule, Rfl: 2    hydrochlorothiazide (HYDRODIURIL) 25 mg tablet, Take 25 mg by mouth daily, Disp: , Rfl:     levothyroxine 25 mcg tablet, Take 25 mcg by mouth daily  , Disp: , Rfl: 0    levothyroxine 50 mcg tablet, Take 50 mcg by mouth daily, Disp: , Rfl:     losartan (COZAAR) 100 MG tablet, Take 100 mg by mouth daily, Disp: , Rfl:     meloxicam (MOBIC) 15 mg tablet, Take 15 mg by mouth, Disp: , Rfl:     metoprolol succinate (TOPROL-XL) 50 mg 24 hr tablet, "I take one a day bedtime", Disp: , Rfl:     naproxen sodium (ALEVE) 220 MG tablet, Take 1 tablet (220 mg total) by mouth 2 (two) times a day with meals, Disp: 20 tablet, Rfl: 0    oxybutynin (DITROPAN-XL) 5 mg 24 hr tablet, Take 5 mg by mouth daily, Disp: , Rfl:     pantoprazole (PROTONIX) 40 mg tablet, Take 40 mg by mouth daily, Disp: , Rfl:     tiZANidine (ZANAFLEX) 4 mg tablet, Take 1 tablet (4 mg total) by mouth every 8 (eight) hours as needed for muscle spasms, Disp: 90 tablet, Rfl: 0    traMADol (ULTRAM) 50 mg tablet, Take 50 mg by mouth every 6 (six) hours as needed for moderate pain, Disp: , Rfl:     triamcinolone (KENALOG) 0 1 % cream, if needed  , Disp: , Rfl:         Scribe Attestation    I,:   am acting as a scribe while in the presence of the attending physician :       I,:   personally performed the services described in this documentation    as scribed in my presence :

## 2022-10-05 ENCOUNTER — OFFICE VISIT (OUTPATIENT)
Dept: PHYSICAL THERAPY | Facility: REHABILITATION | Age: 52
End: 2022-10-05
Payer: MEDICARE

## 2022-10-05 DIAGNOSIS — M76.61 TENDONITIS, ACHILLES, RIGHT: Primary | ICD-10-CM

## 2022-10-05 PROCEDURE — 97112 NEUROMUSCULAR REEDUCATION: CPT

## 2022-10-05 PROCEDURE — 97140 MANUAL THERAPY 1/> REGIONS: CPT

## 2022-10-05 PROCEDURE — 97110 THERAPEUTIC EXERCISES: CPT

## 2022-10-05 NOTE — PROGRESS NOTES
Daily Note     Today's date: 10/5/2022  Patient name: Lukas Ramirez  : 1970  MRN: [id number]  Referring provider: Adelso Gibson DO  Dx:   Encounter Diagnosis     ICD-10-CM    1  Tendonitis, Achilles, right  M76 61                   Subjective: Pt reports his left knee has been bothering him since receiving third gel injection early this week  States that his L achilles/calf has also started to bother him, along scar of a prior surgery  Objective: See treatment diary below      Assessment: Tolerated treatment well  Initiated POC as outlined below  Moderate soft tissue restriction along R distal gastroc and prox achilles tendon  Slight discomfort reported during seated HR  Was able to perform all other exercise with no significant increase in symptoms  Patient would benefit from continued PT      Plan: Continue per plan of care  Monitor response to initial treatment NV         Precautions: HTN, RA, fibromyalgia, multiple orthopedic surgeries        Manuals 9/22 10/5           TCJ A-P mobs             IASTM/STM  AFB           Great Toe Extension Distraction  AFB           Assess SL heel raise and lumbar              Neuro Re-Ed 9/22 10/5           Seated Slump Nerve Glide HEP             Seated PF with TB HEP            Tandem balance  airex  30"x3 b/l                                                               Ther Ex 9/22 10/5           Seated heel raises  x20           VG  L7 - 5'           Leg press heel raises              Soleus Wall Stretch  HEP  SB  gastroc/soleus  20"x4 ea           Towel stretch  20"x4                                                  Ther Activity                                       Gait Training                                       Modalities

## 2022-10-07 ENCOUNTER — OFFICE VISIT (OUTPATIENT)
Dept: PHYSICAL THERAPY | Facility: REHABILITATION | Age: 52
End: 2022-10-07
Payer: MEDICARE

## 2022-10-07 DIAGNOSIS — M76.61 TENDONITIS, ACHILLES, RIGHT: Primary | ICD-10-CM

## 2022-10-07 PROCEDURE — 97140 MANUAL THERAPY 1/> REGIONS: CPT

## 2022-10-07 NOTE — PROGRESS NOTES
Daily Note     Today's date: 10/7/2022  Patient name: Ronaldo Wood  : 1970  MRN: [id number]  Referring provider: Chelsea Gamino DO  Dx:   Encounter Diagnosis     ICD-10-CM    1  Tendonitis, Achilles, right  M76 61        Start Time: 1125  Stop Time: 1145  Total time in clinic (min): 20 minutes    Subjective: Patient reports some soreness after last session but nothing that lasted  Today he reports he is feeling good, states PT so far is working well  Objective: See treatment diary below      Assessment: Only performed manual treatments today as patient needed to leave early  Good tolerance for manuals  Resume previous activities next visit  Patient would benefit from continued PT      Plan: Continue per plan of care        Precautions: HTN, RA, fibromyalgia, multiple orthopedic surgeries        Manuals 9/22 10/5 10/7          TCJ A-P mobs             IASTM/STM  AFB PRR          Great Toe Extension Distraction  AFB           Assess SL heel raise and lumbar              Neuro Re-Ed 9/22 10/5           Seated Slump Nerve Glide HEP             Seated PF with TB HEP            Tandem balance  airex  30"x3 b/l                                                               Ther Ex 9/22 10/5           Seated heel raises  x20           VG  L7 - 5'           Leg press heel raises              Soleus Wall Stretch  HEP  SB  gastroc/soleus  20"x4 ea           Towel stretch  20"x4                                                  Ther Activity                                       Gait Training                                       Modalities

## 2022-10-10 ENCOUNTER — APPOINTMENT (OUTPATIENT)
Dept: PHYSICAL THERAPY | Facility: REHABILITATION | Age: 52
End: 2022-10-10
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
## 2022-10-17 ENCOUNTER — APPOINTMENT (OUTPATIENT)
Dept: PHYSICAL THERAPY | Facility: REHABILITATION | Age: 52
End: 2022-10-17

## 2022-10-21 ENCOUNTER — APPOINTMENT (OUTPATIENT)
Dept: PHYSICAL THERAPY | Facility: REHABILITATION | Age: 52
End: 2022-10-21

## 2022-10-24 ENCOUNTER — APPOINTMENT (OUTPATIENT)
Dept: PHYSICAL THERAPY | Facility: REHABILITATION | Age: 52
End: 2022-10-24

## 2022-10-28 ENCOUNTER — APPOINTMENT (OUTPATIENT)
Dept: PHYSICAL THERAPY | Facility: REHABILITATION | Age: 52
End: 2022-10-28

## 2022-11-11 ENCOUNTER — OFFICE VISIT (OUTPATIENT)
Dept: DENTISTRY | Facility: CLINIC | Age: 52
End: 2022-11-11

## 2022-11-11 VITALS — HEART RATE: 62 BPM | SYSTOLIC BLOOD PRESSURE: 196 MMHG | DIASTOLIC BLOOD PRESSURE: 118 MMHG

## 2022-11-11 DIAGNOSIS — K06.1 GINGIVAL ENLARGEMENT: Primary | ICD-10-CM

## 2022-11-11 NOTE — PROGRESS NOTES
Patient presents for limited exam    CC: "My gums on the bottom right are bothering me and nobody called me to schedule an appt for it "    [facility] reviewed- pt has very high blood pressure  Reading today was 196/118 pulse -62  Pt states, "This is normal for me, I take 3 blood pressure medications regularly and I took them this morning " Hyg explained we will likely not be able to do any dental treatment on him today or at future appts if his blood pressure is continuously this high due to risk of a medical emergency in the dental chair  Radiographs taken: none    Dr Anam Aguilar did exam-  Findings/recommendations:  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

## 2023-02-03 ENCOUNTER — OFFICE VISIT (OUTPATIENT)
Dept: DENTISTRY | Facility: CLINIC | Age: 53
End: 2023-02-03

## 2023-02-03 DIAGNOSIS — K04.7 DENTAL INFECTION: Primary | ICD-10-CM

## 2023-02-03 DIAGNOSIS — Z01.21 ENCOUNTER FOR DENTAL EXAMINATION AND CLEANING WITH ABNORMAL FINDINGS: ICD-10-CM

## 2023-02-03 RX ORDER — CHLORHEXIDINE GLUCONATE 0.12 MG/ML
15 RINSE ORAL 2 TIMES DAILY
Qty: 120 ML | Refills: 0 | Status: SHIPPED | OUTPATIENT
Start: 2023-02-03

## 2023-02-03 RX ORDER — AMOXICILLIN 500 MG/1
500 CAPSULE ORAL EVERY 8 HOURS SCHEDULED
Qty: 21 CAPSULE | Refills: 0 | Status: SHIPPED | OUTPATIENT
Start: 2023-02-03 | End: 2023-02-10

## 2023-02-03 NOTE — DENTAL PROCEDURE DETAILS
Return following medical consult    Reviewed medical hist and took blood pressure:  176/114  198/136  164/121    ASA-III  Pts CC"-I was referred to  [facility] for eval of an area in my mouth that I need looked at "  Patient has gingival over growth between #27 and #28 and went to [facility] for a consultation  Patient will be returning in 4/2023 to have over growth removed  Patient was scheduled today for a full mouth debridement and was unable to have the procedure completed due to BP readings  Timmy Diaz sent a med consult and prescribed Amox with Peridex  NV:Full mouth debridement - Check to see if med consult was returned

## 2023-03-20 NOTE — ED NOTES
Report taken from [facility]  Transfer of care occurred  Dr Erasto Watts to bedside for evaluation        Clifton Heart RN  03/28/18 [id number]
n/a

## 2023-06-07 ENCOUNTER — HOSPITAL ENCOUNTER (OUTPATIENT)
Facility: HOSPITAL | Age: 53
Setting detail: OUTPATIENT SURGERY
Discharge: HOME | End: 2023-06-07
Attending: ORTHOPAEDIC SURGERY | Admitting: ORTHOPAEDIC SURGERY
Payer: MEDICARE

## 2023-06-07 VITALS
RESPIRATION RATE: 20 BRPM | HEIGHT: 68 IN | WEIGHT: 186 LBS | HEART RATE: 56 BPM | SYSTOLIC BLOOD PRESSURE: 138 MMHG | TEMPERATURE: 96.2 F | DIASTOLIC BLOOD PRESSURE: 84 MMHG | BODY MASS INDEX: 28.19 KG/M2 | OXYGEN SATURATION: 95 %

## 2023-06-07 DIAGNOSIS — M65.332 TRIGGER MIDDLE FINGER OF LEFT HAND: Primary | ICD-10-CM

## 2023-06-07 PROCEDURE — 26055 INCISE FINGER TENDON SHEATH: CPT | Performed by: ORTHOPAEDIC SURGERY

## 2023-06-07 PROCEDURE — NC001 PR NO CHARGE: Performed by: PHYSICIAN ASSISTANT

## 2023-06-07 PROCEDURE — 26055 INCISE FINGER TENDON SHEATH: CPT | Performed by: PHYSICIAN ASSISTANT

## 2023-06-07 RX ORDER — TRAMADOL HYDROCHLORIDE 50 MG/1
50 TABLET ORAL EVERY 6 HOURS PRN
Qty: 5 TABLET | Refills: 0 | Status: SHIPPED | OUTPATIENT
Start: 2023-06-07

## 2023-06-07 RX ORDER — SENNOSIDES 8.6 MG
650 CAPSULE ORAL EVERY 8 HOURS PRN
Qty: 30 TABLET | Refills: 0 | Status: SHIPPED | OUTPATIENT
Start: 2023-06-07

## 2023-06-07 RX ORDER — MAGNESIUM HYDROXIDE 1200 MG/15ML
LIQUID ORAL AS NEEDED
Status: DISCONTINUED | OUTPATIENT
Start: 2023-06-07 | End: 2023-06-07 | Stop reason: HOSPADM

## 2023-06-07 RX ORDER — TRAMADOL HYDROCHLORIDE 50 MG/1
50 TABLET ORAL EVERY 6 HOURS PRN
Status: DISCONTINUED | OUTPATIENT
Start: 2023-06-07 | End: 2023-06-07 | Stop reason: HOSPADM

## 2023-06-07 RX ORDER — CEFAZOLIN SODIUM 2 G/50ML
2000 SOLUTION INTRAVENOUS ONCE
Status: DISCONTINUED | OUTPATIENT
Start: 2023-06-07 | End: 2023-06-07 | Stop reason: HOSPADM

## 2023-06-07 RX ORDER — COVID-19 ANTIGEN TEST
220 KIT MISCELLANEOUS 2 TIMES DAILY
Qty: 60 CAPSULE | Refills: 0 | Status: SHIPPED | OUTPATIENT
Start: 2023-06-07 | End: 2023-07-07

## 2023-06-07 RX ORDER — ACETAMINOPHEN 325 MG/1
650 TABLET ORAL EVERY 6 HOURS PRN
Status: DISCONTINUED | OUTPATIENT
Start: 2023-06-07 | End: 2023-06-07 | Stop reason: HOSPADM

## 2023-06-07 RX ORDER — ONDANSETRON 2 MG/ML
4 INJECTION INTRAMUSCULAR; INTRAVENOUS EVERY 6 HOURS PRN
Status: DISCONTINUED | OUTPATIENT
Start: 2023-06-07 | End: 2023-06-07 | Stop reason: HOSPADM

## 2023-06-07 RX ORDER — LIDOCAINE HYDROCHLORIDE AND EPINEPHRINE 10; 10 MG/ML; UG/ML
20 INJECTION, SOLUTION INFILTRATION; PERINEURAL ONCE
Status: DISCONTINUED | OUTPATIENT
Start: 2023-06-07 | End: 2023-06-07 | Stop reason: HOSPADM

## 2023-06-07 RX ADMIN — SODIUM BICARBONATE: 84 INJECTION, SOLUTION INTRAVENOUS at 08:09

## 2023-06-07 NOTE — DISCHARGE INSTR - AVS FIRST PAGE
Post Operative Instructions    You have had surgery on your arm today, please read and follow the information below:  Elevate your hand above your elbow during the next 24-48 hours to help with swelling  Place your hand and arm over your head with motion at your shoulder three times a day  Do not apply any cream/ointment/oil to your incisions including antibiotics  Do not soak your hands in standing water (dishwater, tubs, Jacuzzi's, pools, etc ) until given permission (typically 2-3 weeks after injury)    Call the office if you notice any:  Increased numbness or tingling of your hand or fingers that is not relieved with elevation  Increasing pain that is not controlled with medication  Difficulty chewing, breathing, swallowing  Chest pains or shortness of breath  Fever over 101 4 degrees  Bandage: Remove bandage after 5 days  Motion: Move fingers into a fist 5 times a day, DO NOT move any splinted fingers  Weight bearing status: Avoid heavy lifting (>5 pounds) with the extremity that was operated on until follow up appointment  Normal activities of daily living are OK  Ice: Ice for 10 minutes every hour as needed for swelling x 24 hours  Sling: No sling necessary  Medications:   Naproxen 220 mg two times a day   Tylenol Extended Release 650 mg every 8 hours  Tramadol 1 tab every 6 hours AS needed for pain     Follow-up Appointment: 7-10 days  Please call the office if you have any questions or concerns regarding your post-operative care

## 2023-06-07 NOTE — OP NOTE
OPERATIVE REPORT  PATIENT NAME: Haydee Meyer  :  1970  MRN: [id number]  Pt Location: [facility]    SURGERY DATE: 23    Surgeon(s) and Role:     * Blanche Murray MD - Primary     * Sammy Ortiz PA-C - Assisting    Pre-Op Diagnosis:  Trigger middle finger of left hand [M65 332]    Post-Op Diagnosis:   Trigger middle finger of left hand [M65 332]    Procedure(s) (LRB):  Left long finger trigger finger release (Left)    Specimen(s):  No specimens collected during this procedure  Estimated Blood Loss:   Minimal      Anesthesia Type:   Local    Operative Indications: The patient has a history of Trigger Finger  left  long finger that was recalcitrant to conservative management  The decision was made to bring the patient to the operating room for Trigger Finger Release  left  long finger  Risks of the procedure were explained which include, but are not limited to bleeding; infection; damage to nerves, arteries,veins, tendons; scar; pain; need for reoperation; failure to give desired result; and risks of anaesthesia  All questions were answered to satisfaction and they were willing to proceed  Operative Findings:  Left long finger trigger finger    Complications:   None    Procedure and Technique:  After the patient, site, and procedure were identified, the patient was brought into the operating room in a supine position  Local anaesthesia was adminstered in the preoperative holding area  A tourniquet was not used  The  left upper extremity was then prepped and drapped in a normal, sterile, orthopedic fashion  After the patient, site, and procedure were once again identified, attention was turned to the left long finger  An incision was made over the flexor tendon sheath at the level of the A1 pulley  Dissection was carried out in-line with the flexor tendon sheath and the radial and ulnar digital artery and nerve were protected    The A1 pulley was identified at the base of the incision  Under direct visualization, the A1 pulley was divided along the midline in its entirety with care taken to avoid injury to the underlying tendon  The tendons were examined to ensure that no further catching, popping, clicking or locking occurred with motion of the finger  At the completion of the procedure, hemostasis was obtained with cautery and direct pressure  The wounds were copiously irrigated with sterile solution  The wounds were closed with Prolene  Sterile dressings were applied, including Xeroform, gauze, tweeners, webril, ACE  Please note, all sponge, needle, and instrument counts were correct prior to closure  Loupe magnification was utilized  The patient tolerated the procedure well  I was present for all critical portions of the procedure , A qualified resident physician was not available  and A physician assistant was required during the procedure for retraction, tissue handling, dissection and suturing      Patient Disposition:  PACU  and hemodynamically stable    SIGNATURE: Kaleb Christian MD  DATE: 06/07/23  TIME: 8:48 AM

## 2023-06-16 ENCOUNTER — OFFICE VISIT (OUTPATIENT)
Dept: OBGYN CLINIC | Facility: CLINIC | Age: 53
End: 2023-06-16

## 2023-06-16 VITALS
WEIGHT: 186 LBS | SYSTOLIC BLOOD PRESSURE: 155 MMHG | HEART RATE: 67 BPM | DIASTOLIC BLOOD PRESSURE: 103 MMHG | BODY MASS INDEX: 28.19 KG/M2 | HEIGHT: 68 IN

## 2023-06-16 DIAGNOSIS — Z47.89 AFTERCARE FOLLOWING SURGERY OF THE MUSCULOSKELETAL SYSTEM: Primary | ICD-10-CM

## 2023-06-16 PROCEDURE — 99024 POSTOP FOLLOW-UP VISIT: CPT | Performed by: PHYSICIAN ASSISTANT

## 2023-06-20 NOTE — PROGRESS NOTES
"Assessment:   S/P Left long finger trigger finger release - Left on 6/7/2023    Plan:   Resume activities as tolerated  - use pain as a guide  - reviewed stretching and ROM exercises  Resume normal hygiene activities  - do not submerge for an additional 2-3 days    Follow Up:  PRN    CHIEF COMPLAINT:  Chief Complaint   Patient presents with   • Left Hand - Follow-up     Suture removal         SUBJECTIVE:  Richard Ku is a 47 y o  male who presents for follow up after Left long finger trigger finger release - Left on 6/7/2023  Today patient has no complaints  He denies any locking of the finger  He denies any pain         PHYSICAL EXAMINATION:  Vital signs: BP (!) 155/103 (BP Location: Right arm, Patient Position: Sitting, Cuff Size: Standard) Comment: states always high at dr  Pulse 67   Ht 5' 8\" (1 727 m)   Wt 84 4 kg (186 lb)   BMI 28 28 kg/m²   General: well developed and well nourished, alert, oriented times 3 and appears comfortable  Psychiatric: Normal    MUSCULOSKELETAL EXAMINATION:  Incision: Clean, dry, intact  Range of Motion: Limited due to stiffness  Neurovascular status: Neuro intact, good cap refill  Activity Restrictions: No restrictions  Done today: Sutures out      STUDIES REVIEWED:  No Studies to review      PROCEDURES PERFORMED:  Procedures  No Procedures performed today    "

## 2023-06-29 ENCOUNTER — OFFICE VISIT (OUTPATIENT)
Dept: DENTISTRY | Facility: CLINIC | Age: 53
End: 2023-06-29

## 2023-06-29 VITALS — SYSTOLIC BLOOD PRESSURE: 166 MMHG | HEART RATE: 56 BPM | DIASTOLIC BLOOD PRESSURE: 102 MMHG

## 2023-06-29 DIAGNOSIS — K05.6 PERIODONTAL DISEASE: Primary | ICD-10-CM

## 2023-06-29 RX ORDER — NIFEDIPINE 60 MG/1
TABLET, EXTENDED RELEASE ORAL
COMMUNITY
Start: 2023-06-26

## 2023-06-29 RX ORDER — NIFEDIPINE 60 MG/1
TABLET, EXTENDED RELEASE ORAL
COMMUNITY
Start: 2023-05-31 | End: 2023-07-03 | Stop reason: SDUPTHER

## 2023-06-29 NOTE — PROGRESS NOTES
I supervised the Advanced Practitioner  I reviewed the Advanced Practitioner note and agree      Phuong Mejias, DMD 06/29/23

## 2023-06-29 NOTE — DENTAL PROCEDURE DETAILS
"Patient presents for full mouth debridement    Reviewed medical / dental history with patient  ASA:II  Pain:0/10 Nothing noted  Pts  CC:\" This is my first dental cleaning ever and I am nervous about choking on my saliva  \"  Patient and hygienist established a plan of action during the process and all went well  Patient should do very well with SRPs and regular suction  Ultrasonic  was used to remove the majority of supracalculus and some subcalculus from teeth  Explained to patient following this appointment, the patient must return in about 2-3 weeks for  gum evaluation to determine if further gum therapy is indicated via scaling/root planing  Explained to patient this type of a cleaning preformed today was not a thorough cleaning, it was to remove excess debris from teeth to be able to evaluate and diagnose properly  Pt dismissed in good health, no complications and all questions answered  NV: FEDERICO with perio probings to move forward with SRPs    "

## 2023-06-30 ENCOUNTER — TELEPHONE (OUTPATIENT)
Dept: OBGYN CLINIC | Facility: HOSPITAL | Age: 53
End: 2023-06-30

## 2023-06-30 NOTE — TELEPHONE ENCOUNTER
Caller: Patient    Doctor: [id number] [id number]    Reason for call: Patient is having a lot of pain in middle finger and locking up  He would like to be seen but no appts available  Please advise      Call back#: [phone]

## 2023-06-30 NOTE — TELEPHONE ENCOUNTER
Could this patient be placed on the schedule for Monday, 7/3/2023 or following week with myself  Thank you

## 2023-07-03 ENCOUNTER — OFFICE VISIT (OUTPATIENT)
Dept: OBGYN CLINIC | Facility: CLINIC | Age: 53
End: 2023-07-03

## 2023-07-03 ENCOUNTER — TELEPHONE (OUTPATIENT)
Dept: OBGYN CLINIC | Facility: CLINIC | Age: 53
End: 2023-07-03

## 2023-07-03 VITALS — SYSTOLIC BLOOD PRESSURE: 140 MMHG | BODY MASS INDEX: 29.19 KG/M2 | WEIGHT: 192 LBS | DIASTOLIC BLOOD PRESSURE: 70 MMHG

## 2023-07-03 DIAGNOSIS — M65.332 TRIGGER MIDDLE FINGER OF LEFT HAND: Primary | ICD-10-CM

## 2023-07-03 PROCEDURE — 99024 POSTOP FOLLOW-UP VISIT: CPT | Performed by: ORTHOPAEDIC SURGERY

## 2023-07-03 NOTE — PROGRESS NOTES
Assessment:   S/P Left long finger trigger finger release - Left on 6/7/2023    Plan:   Patient was advised to attend some therapy to work on range of motion, scar tissue mobilization  He was advised use heat massage as demonstrated in the office  He was advised that he can have tenderness over the incision and into the digit for 2 to 3 months after surgery which is normal  He will follow-up in 6 weeks for reevaluation of his symptoms    Follow Up:  6 weeks    To Do Next Visit:  Reevaluation      CHIEF COMPLAINT:  Chief Complaint   Patient presents with   • Left Middle Finger - Post-op Problem, Swelling, Pain     S/P Left long finger TF 6/7/23          SUBJECTIVE:  Marylin Ochoa is a 47 y.o. male who presents for follow up after Left long finger trigger finger release - Left on 6/7/2023. Today patient has some tenderness over the palmar aspect of the hand at the incision site. He states he still has some difficulty making a full composite fist.  He has been working on his exercises that were given to him and his postoperative visit. He still notes some pain however. He denies any new injuries or trauma to the area.        PHYSICAL EXAMINATION:  Vital signs: /70   Wt 87.1 kg (192 lb)   BMI 29.19 kg/m²   General: well developed and well nourished, alert, oriented times 3 and appears comfortable  Psychiatric: Normal    MUSCULOSKELETAL EXAMINATION:  Incision: clean, dry and healed  Range of Motion: As expected and opposition intact  Neurovascular status: Neuro intact, good cap refill  Activity Restrictions: No restrictions         STUDIES REVIEWED:  No Studies to review      PROCEDURES PERFORMED:  Procedures  No Procedures performed today

## 2023-07-10 NOTE — PROGRESS NOTES
OT Evaluation     Today's date: 2023  Patient name: Kyung Carrillo  : 1970  MRN: [id number]  Referring provider: Denis Leiva  Dx:   Encounter Diagnosis     ICD-10-CM    1. Trigger middle finger of left hand  M65.332 Ambulatory Referral to PT/OT Hand Therapy          Start Time: 6507  Stop Time: 1450  Total time in clinic (min): 35 minutes    Assessment  Assessment details: Pt presents for OT eval s/p L middle finger trigger release on . Subjectively, he has stiffness and pain limiting function. Objectively, he has minimal edema present. There is some wrist and digit stiffness with weakened  strength compared to uninvolved side. He reports some slight hypersensitivity around the scar. Educated on AROM, gentle stretches, and scar mobilization/edema mobilization techniques. OT recommending 1x/week for 3-4 weeks to establish HEP. Pt understands/agrees with current POC. Impairments: abnormal or restricted ROM, activity intolerance, impaired physical strength, lacks appropriate home exercise program and weight-bearing intolerance    Symptom irritability: moderateUnderstanding of Dx/Px/POC: good   Prognosis: good    Goals  Short term goals: To be achieved within 3-4 visits from start of care on 23. Patient will be independent with donning/doffing orthotic and report compliance with recommended wear and care instructions   Patient will demonstrate independence with edema and scar mobilization techniques. Patient will demonstrate independence with all AROM and stretching HEPs. Patient will verbalize understanding of activity limitations within post-op precautions for improved symptom management. Patient will verbalize understanding of activity modifications to maximize functional use of L hand throughout normal routine. Patient will tolerate therapeutic exercises/activities with reports of pain <3/10.   Patient will demonstrate improved  strength to within 20% of his uninvolved side. Plan  Patient would benefit from: skilled occupational therapy  Planned modality interventions: thermotherapy: hydrocollator packs and ultrasound  Other planned modality interventions: IASTM  Planned therapy interventions: IADL retraining, manual therapy, activity modification, fine motor coordination training, flexibility, functional ROM exercises, home exercise program, graded exercise, graded activity, therapeutic exercise, therapeutic activities, stretching, strengthening, patient education, orthotic fitting/training, joint mobilization, orthotic management and training, work reintegration, dressing changes, balance/weight bearing training and ADL retraining  Frequency: 1x week  Duration in visits: 4  Duration in weeks: 4  Plan of Care beginning date: 2023  Plan of Care expiration date: 2023  Treatment plan discussed with: patient        Subjective Evaluation    History of Present Illness  Date of surgery: 2023  Mechanism of injury: surgery  Mechanism of injury: Maliha Alfaro is a 46 yo RHD male who presents to OT for eval s/p L hand middle finger trigger release on . Primary c/o at this time is stiffness, soreness in PIP and DIP joints, and hypersensitivity. Hx of B CTR. Occupational Profile  ADLs: no problems     IADLs: some pain/discomfort with carrying grocery bags, heavier objects. School: n/a    Driving: no issues    Sport/Leisure: Some difficulty fixing things up around the house. Work: Disabled. Does work around the house. Pain when he hits his palm.      Quality of life: good    Pain  Current pain ratin  At best pain ratin  At worst pain ratin  Quality: sharp  Progression: improved    Treatments  Current treatment: occupational therapy  Patient Goals  Patient goals for therapy: decreased edema, decreased pain, increased motion, return to sport/leisure activities, independence with ADLs/IADLs and increased strength          Objective    Flowsheet Rows    Flowsheet Row Most Recent Value   PT/OT G-Codes    Current Score 57   Projected Score 71       Tissue Integrity: Healed incision, scar is flat. Sensation: intact, WNL    Special Tests: n/a    Edema (circumferential) (cm):    Right Left   MCPs 22 21.7   Middle P1 7.3 8.0       AROM (PROM) degrees        Wrist   Right Left   Flexion 60 58   Extension 58 62     Right Hand (ext/flex)   D3   MCP 75      DIP 65     Left Hand (ext/flex)   D3   MCP 70   PIP 83   DIP 65                   /Pinch Strength  MEIR Mo   Dynamometer         Position #2 (lbs) 61.4 27.9     Pinch Meter          3 JAW JULIANN 9 9           Manuals 7/12            Scar Edu            Edema Edu            STM             Desensitization                          Ther Ex HEPs            TGEs x20            Blocking             PROM Edu            Ext stretch Edu             strength             Pinch strength                                       Ther Activity             FMC/Dexterity             Functional use                                                                                                                                               Modalities             HP 5'                               Sensation and skin integrity assessed prior to, during, and following the application of moist heat pack. All assessments found sensation and integrity to be intact. Pt instructed to inform clinician if use of the modality became uncomfortable and/or too intense to tolerate at any time.

## 2023-07-12 ENCOUNTER — EVALUATION (OUTPATIENT)
Dept: OCCUPATIONAL THERAPY | Age: 53
End: 2023-07-12
Payer: MEDICARE

## 2023-07-12 ENCOUNTER — OFFICE VISIT (OUTPATIENT)
Dept: DENTISTRY | Facility: CLINIC | Age: 53
End: 2023-07-12

## 2023-07-12 VITALS — HEART RATE: 62 BPM | DIASTOLIC BLOOD PRESSURE: 86 MMHG | SYSTOLIC BLOOD PRESSURE: 120 MMHG

## 2023-07-12 DIAGNOSIS — Z59.9 FINANCIAL DIFFICULTIES: ICD-10-CM

## 2023-07-12 DIAGNOSIS — K04.7 DENTAL ABSCESS: Primary | ICD-10-CM

## 2023-07-12 DIAGNOSIS — M65.332 TRIGGER MIDDLE FINGER OF LEFT HAND: Primary | ICD-10-CM

## 2023-07-12 DIAGNOSIS — Z59.41 FOOD INSECURITY: ICD-10-CM

## 2023-07-12 DIAGNOSIS — Z01.21 ENCOUNTER FOR DENTAL EXAMINATION AND CLEANING WITH ABNORMAL FINDINGS: ICD-10-CM

## 2023-07-12 PROCEDURE — D0150 COMPREHENSIVE ORAL EVALUATION - NEW OR ESTABLISHED PATIENT: HCPCS

## 2023-07-12 PROCEDURE — 97165 OT EVAL LOW COMPLEX 30 MIN: CPT

## 2023-07-12 PROCEDURE — 97110 THERAPEUTIC EXERCISES: CPT

## 2023-07-12 RX ORDER — CLINDAMYCIN HYDROCHLORIDE 300 MG/1
300 CAPSULE ORAL 3 TIMES DAILY
Qty: 21 CAPSULE | Refills: 0 | Status: SHIPPED | OUTPATIENT
Start: 2023-07-12 | End: 2023-07-19

## 2023-07-12 SDOH — ECONOMIC STABILITY - INCOME SECURITY: PROBLEM RELATED TO HOUSING AND ECONOMIC CIRCUMSTANCES, UNSPECIFIED: Z59.9

## 2023-07-12 SDOH — ECONOMIC STABILITY - FOOD INSECURITY: FOOD INSECURITY: Z59.41

## 2023-07-12 NOTE — PROGRESS NOTES
Cas Lind, 47 y o M  CC: "I am here after my cleaning."  Pain level: 0    Med hx / meds: reviewed. Pt previously had high BP readings. BP today was in normal range. Pt recalled blood pressure stabilized after finding it was due to kidney issues. Allergy to Lisinopril and Reglan. O/E:  Comprehensive exam completed on patient. No new radiographs taken today. FMX taken Aug 2022. E/O: Remarkable    I/O:    Radiographic findings: Calculus noted on practically all teeth. PAP noted on M root #3. Clinical findings:    Perio charting completed. 5s and 6s noted. Some difficulty probing anteriors, due to supragingival calculus present. Significant recession noted on most teeth. Diagnosis: Stage II Periodontitis, Grade B   Prognosis: good-fair    Restorative findings:  1 O caries  3 L caries  15 OL caries  18 L broken amalgam  28 O caries  29 O caries  30 O caries    1 and 2 supra-erupted with no opposing dentition. 3: + to percussion, PAP radiograph and noted on M root, subgingivial calculus and decay (L) noted  22: mucogingival defect and periodontal abscess, with pus. Pt not in any pain. Prescribed and sent Clindamycin to take 3 times a day for 7 days to pt's pharmacy. Treatment Plan:  1. SRP x 4q (after insurance authorization comes in)  2. Re-evaluate #3 for possible extraction (informed pt that this might be a possible treatment option)  3. Advised extraction for supra-erupted #1 and 2  4.  Composite restorations 15 OL, 18 OL, 28 O, 29 O, and 30 O    Tx done: Comprehensive exam    NV: SRP  Attending: Dr. Randal Osorio

## 2023-08-16 ENCOUNTER — PATIENT OUTREACH (OUTPATIENT)
Dept: DENTISTRY | Facility: CLINIC | Age: 53
End: 2023-08-16

## 2023-08-16 NOTE — PROGRESS NOTES
SW CM referral received 7/12 from MARIO Sarkar DMD r/t pt [facility] needs. Chart review completed. Per chart review, pt reported difficulties paying for daily basic needs and food insecurity. OP SWCM called pt introducing self, role and reason for calling. Pt declined needing assistance at this time. Pt took down SW contact number to reach out if anything changes or any needs arise. OP SWCM will be available to assist if needed in future. Referral to be closed at this time.

## 2023-12-22 ENCOUNTER — OFFICE VISIT (OUTPATIENT)
Dept: DENTISTRY | Facility: CLINIC | Age: 53
End: 2023-12-22

## 2023-12-22 VITALS — DIASTOLIC BLOOD PRESSURE: 124 MMHG | SYSTOLIC BLOOD PRESSURE: 170 MMHG

## 2023-12-22 DIAGNOSIS — Z00.00 ENCOUNTER FOR SCREENING AND PREVENTATIVE CARE: Primary | ICD-10-CM

## 2023-12-22 PROCEDURE — D0191 ASSESSMENT OF A PATIENT: HCPCS

## 2023-12-22 NOTE — DENTAL PROCEDURE DETAILS
Pt arrived at [facility] for SRP apt  Bp was taken twice and reading was too high. Director and Attending Drs were consulted and it was advised that pt. be referred to ED or contact his primary care dr to discuss today. We explained to pt the risk associated with administering anesthesia for the SRP treatment that was planned for today. Pt understood.   Pt &  was instructed not to schedule  another SRP apt until BP is under control.     NV: SRP UR when BP is regulated  SRP UL  SRP LL

## 2024-01-04 NOTE — PROGRESS NOTES
Assessment of patient  Pt presents for sc/rp. Pt presents with too high BP. Pt was previously rescheduled in December due to the same concern.   Bp 1st reading: 160/100 right arm/ sitting position       2nd readin/104 Left arm/ bare arm/ sitting position  Per Dr Marc, no treatment to be rendered today. Placed referral into patients chart for referral to PCP for medical evaluation/ clearance prior to sc/rp tx planned.

## 2024-01-05 ENCOUNTER — OFFICE VISIT (OUTPATIENT)
Dept: DENTISTRY | Facility: CLINIC | Age: 54
End: 2024-01-05

## 2024-01-05 VITALS — HEART RATE: 73 BPM | DIASTOLIC BLOOD PRESSURE: 100 MMHG | SYSTOLIC BLOOD PRESSURE: 167 MMHG

## 2024-01-05 DIAGNOSIS — Z01.20 ENCOUNTER FOR DENTAL EXAMINATION: Primary | ICD-10-CM

## 2024-01-05 PROCEDURE — D0191 ASSESSMENT OF A PATIENT: HCPCS

## 2025-04-17 NOTE — TELEPHONE ENCOUNTER
Arrived  for prolia injection. Indication and side effects reviewed. Denies recent dental work. Labs and medications verified. Prolia administered in L arm without incidence. Instructed to call prescribing MD for any concerns or questions and instructed on how to schedule future appts.  Pt vu and discharged in stable condition.     Will re-evaluate at next office visit

## (undated) DEVICE — DISPOSABLE EQUIPMENT COVER: Brand: SMALL TOWEL DRAPE

## (undated) DEVICE — SILVER-COATED ANTIMICROBIAL BARRIER DRESSING: Brand: ACTICOAT   4" X 8"

## (undated) DEVICE — REM POLYHESIVE ADULT PATIENT RETURN ELECTRODE: Brand: VALLEYLAB

## (undated) DEVICE — DRESSING GUAZE ADH BORDER 4 X 4 IN

## (undated) DEVICE — INTENDED FOR TISSUE SEPARATION, AND OTHER PROCEDURES THAT REQUIRE A SHARP SURGICAL BLADE TO PUNCTURE OR CUT.: Brand: BARD-PARKER SAFETY BLADES SIZE 10, STERILE

## (undated) DEVICE — CUFF TOURNIQUET 18 X 4 IN QUICK CONNECT DISP 1 BLADDER

## (undated) DEVICE — PREMIUM DRY TRAY LF: Brand: MEDLINE INDUSTRIES, INC.

## (undated) DEVICE — IMPERVIOUS STOCKINETTE: Brand: DEROYAL

## (undated) DEVICE — GLOVE INDICATOR PI UNDERGLOVE SZ 8 BLUE

## (undated) DEVICE — INTENDED FOR TISSUE SEPARATION, AND OTHER PROCEDURES THAT REQUIRE A SHARP SURGICAL BLADE TO PUNCTURE OR CUT.: Brand: BARD-PARKER SAFETY BLADES SIZE 15, STERILE

## (undated) DEVICE — OCCLUSIVE GAUZE STRIP,3% BISMUTH TRIBROMOPHENATE IN PETROLATUM BLEND: Brand: XEROFORM

## (undated) DEVICE — PADDING,UNDERCAST,COTTON, 4"X4YD STERILE: Brand: MEDLINE

## (undated) DEVICE — ACE WRAP 3 IN UNSTERILE

## (undated) DEVICE — GLOVE SRG BIOGEL ORTHOPEDIC 8.5

## (undated) DEVICE — ACE WRAP 4 IN STERILE

## (undated) DEVICE — KERLIX BANDAGE ROLL: Brand: KERLIX

## (undated) DEVICE — GLOVE SRG BIOGEL 7.5

## (undated) DEVICE — STERILE BETHLEHEM PLASTIC HAND: Brand: CARDINAL HEALTH

## (undated) DEVICE — SUT FIBERWIRE #5 BRAIDED TPR BLUE CUTTING 38 IN AR-7210

## (undated) DEVICE — SYRINGE BULB 2 OZ

## (undated) DEVICE — CURITY STRETCH BANDAGE: Brand: CURITY

## (undated) DEVICE — SUT MONOCRYL 4-0 PS-2 18 IN Y496G

## (undated) DEVICE — NEEDLE 25G X 1 1/2

## (undated) DEVICE — CHLORAPREP HI-LITE 26ML ORANGE

## (undated) DEVICE — PADDING CAST 4 IN  COTTON STRL

## (undated) DEVICE — CURITY NON-ADHERENT STRIPS: Brand: CURITY

## (undated) DEVICE — GAUZE SPONGES,16 PLY: Brand: CURITY

## (undated) DEVICE — 3M™ STERI-STRIP™ REINFORCED ADHESIVE SKIN CLOSURES, R1547, 1/2 IN X 4 IN (12 MM X 100 MM), 6 STRIPS/ENVELOPE: Brand: 3M™ STERI-STRIP™

## (undated) DEVICE — U-DRAPE: Brand: CONVERTORS

## (undated) DEVICE — WEBRIL 4IN X 4YDS

## (undated) DEVICE — ACE WRAP 4 IN UNSTERILE

## (undated) DEVICE — SUT PROLENE 4-0 PS-2 18 IN 8682G

## (undated) DEVICE — RETROGRADE KNIFE BOX OF 6: Brand: ECTRA

## (undated) DEVICE — PLUMEPEN PRO 10FT

## (undated) DEVICE — ADHESIVE SKN CLSR HISTOACRYL FLEX 0.5ML LF

## (undated) DEVICE — VIOLET BRAIDED (POLYGLACTIN 910), SYNTHETIC ABSORBABLE SUTURE: Brand: COATED VICRYL

## (undated) DEVICE — ALL PURPOSE SPONGES,NON-WOVEN, 4 PLY: Brand: CURITY

## (undated) DEVICE — SPONGE PVP SCRUB WING STERILE

## (undated) DEVICE — UNIVERSAL MAJOR EXTREMITY,KIT: Brand: CARDINAL HEALTH

## (undated) DEVICE — SUT VICRYL PLUS 0 CTB-1 27 IN VCPB260H

## (undated) DEVICE — SUT ETHILON 3-0 FS-1 18 IN 663G

## (undated) DEVICE — GLOVE INDICATOR PI UNDERGLOVE SZ 8.5 BLUE

## (undated) DEVICE — INTENDED FOR TISSUE SEPARATION, AND OTHER PROCEDURES THAT REQUIRE A SHARP SURGICAL BLADE TO PUNCTURE OR CUT.: Brand: BARD-PARKER SAFETY BLADES SIZE 11, STERILE

## (undated) DEVICE — STRL COTTON TIP APPLCTR 6IN PK: Brand: CARDINAL HEALTH